# Patient Record
Sex: FEMALE
[De-identification: names, ages, dates, MRNs, and addresses within clinical notes are randomized per-mention and may not be internally consistent; named-entity substitution may affect disease eponyms.]

---

## 2017-09-27 ENCOUNTER — HOSPITAL ENCOUNTER (OUTPATIENT)
Dept: HOSPITAL 14 - H.OPSURG | Age: 47
Discharge: HOME | End: 2017-09-27
Attending: ANESTHESIOLOGY
Payer: MEDICAID

## 2017-09-27 VITALS — BODY MASS INDEX: 31.9 KG/M2

## 2017-09-27 VITALS — RESPIRATION RATE: 18 BRPM

## 2017-09-27 VITALS
DIASTOLIC BLOOD PRESSURE: 79 MMHG | OXYGEN SATURATION: 98 % | SYSTOLIC BLOOD PRESSURE: 125 MMHG | TEMPERATURE: 98.1 F | HEART RATE: 87 BPM

## 2017-09-27 DIAGNOSIS — K21.9: ICD-10-CM

## 2017-09-27 DIAGNOSIS — M17.12: Primary | ICD-10-CM

## 2017-09-27 DIAGNOSIS — I10: ICD-10-CM

## 2017-09-27 PROCEDURE — 64450 NJX AA&/STRD OTHER PN/BRANCH: CPT

## 2017-09-27 NOTE — RAD
PROCEDURE:  Fluoroscopy up to 1 hr.



HISTORY:

PAIN MANAGEMENT



COMPARISON:

None



TECHNIQUE:

Standard protocol for this study/examination.



FINDINGS:

 Total fluoroscopic time (continuous mode) utilized during the 

procedure: 12.5 seconds. Submitted images from the current procedure: 

1.0



IMPRESSION:

Less than 1 hr fluoroscopic time utilized during performance of the 

procedure.

## 2017-09-27 NOTE — OP
PROCEDURE DATE:  09/27/2017



PREOPERATIVE DIAGNOSIS:  Left knee osteoarthritis.



POSTOPERATIVE DIAGNOSIS:  Left knee osteoarthritis.



PROCEDURE:  Left knee genicular nerve block x3.



ANESTHESIA ADMINISTERED BY:  Dr. Garay.



SURGEON:  Sonido Winn MD



TYPE OF ANESTHESIA:  Monitored anesthesia care.



COMPLICATIONS:  None.



SPECIMEN:  None.



DESCRIPTION OF PROCEDURE:  As follows, after we had a discussion of the

procedure with the patient including its risks, benefits, alternative,

outcome data, possibility of no effect or increased pain, the patient

consented to the procedure.  She denies any recent infections, bleeding

tendencies or being on anticoagulants and a decision was then made to

proceed to the OR.



The patient was placed on the fluoroscopy table in a supine position with 2

pillows underneath her left knee.  The knee was prepped and draped in the

usual sterile fashion and sterile technique was adhered during the entire

procedure.  The genicular nerves were located adjacent to the medial and

lateral femoral condyle and also the lateral tibial condyle.  The 3

above-targeted areas were first visualized on the anterior posterior view

fluoroscopy.  The skin overlying the 3 areas was infiltrated with 1%

lidocaine using 25-gauge needle.  Subsequently, a 22-gauge 3-1/2-inch

spinal needle was incrementally advanced under fluoroscopic guidance until

the tip of the needle made bony contact with the target areas.  Then,

fluoroscopy was turned towards the lateral direction to confirm all 3

needles to be in the middle of the bony shaft.  After appropriate placement

of all 3 needles, approximately 4 mL of 0.5% Marcaine and Depo-Medrol

mixture was injected.  The needle was then removed and patient's knee was

cleaned and dried and bandage was applied.



The patient was then transferred to recovery area in good conditions

without any signs of CNS toxicity or any neurological deficits.  She will

have a followup in our office in approximately 2-4 weeks.





__________________________________________

En-Berhane Winn MD



DD:  09/27/2017 12:49:11

DT:  09/27/2017 13:57:08

Job # [id number]

## 2018-03-01 ENCOUNTER — HOSPITAL ENCOUNTER (INPATIENT)
Dept: HOSPITAL 14 - H.ER | Age: 48
LOS: 2 days | Discharge: HOME | DRG: 243 | End: 2018-03-03
Attending: FAMILY MEDICINE | Admitting: FAMILY MEDICINE
Payer: MEDICAID

## 2018-03-01 VITALS — BODY MASS INDEX: 31.9 KG/M2

## 2018-03-01 DIAGNOSIS — G89.29: ICD-10-CM

## 2018-03-01 DIAGNOSIS — F41.9: ICD-10-CM

## 2018-03-01 DIAGNOSIS — I10: ICD-10-CM

## 2018-03-01 DIAGNOSIS — E87.2: ICD-10-CM

## 2018-03-01 DIAGNOSIS — F32.9: ICD-10-CM

## 2018-03-01 DIAGNOSIS — M79.7: ICD-10-CM

## 2018-03-01 DIAGNOSIS — C18.9: ICD-10-CM

## 2018-03-01 DIAGNOSIS — R74.0: ICD-10-CM

## 2018-03-01 DIAGNOSIS — K21.9: ICD-10-CM

## 2018-03-01 DIAGNOSIS — E11.9: ICD-10-CM

## 2018-03-01 DIAGNOSIS — Z79.899: ICD-10-CM

## 2018-03-01 DIAGNOSIS — M54.9: Primary | ICD-10-CM

## 2018-03-01 DIAGNOSIS — C78.7: ICD-10-CM

## 2018-03-02 LAB
ALBUMIN SERPL-MCNC: 4.6 G/DL (ref 3.5–5)
ALBUMIN SERPL-MCNC: 4.6 G/DL (ref 3.5–5)
ALBUMIN/GLOB SERPL: 1.1 {RATIO} (ref 1–2.1)
ALBUMIN/GLOB SERPL: 1.2 {RATIO} (ref 1–2.1)
ALT SERPL-CCNC: 131 U/L (ref 9–52)
ALT SERPL-CCNC: 141 U/L (ref 9–52)
APTT BLD: 27.7 SECONDS (ref 25.6–37.1)
AST SERPL-CCNC: 192 U/L (ref 14–36)
AST SERPL-CCNC: 195 U/L (ref 14–36)
BASOPHILS # BLD AUTO: 0 K/UL (ref 0–0.2)
BASOPHILS NFR BLD: 0.5 % (ref 0–2)
BUN SERPL-MCNC: 10 MG/DL (ref 7–17)
BUN SERPL-MCNC: 7 MG/DL (ref 7–17)
CALCIUM SERPL-MCNC: 9.8 MG/DL (ref 8.4–10.2)
CALCIUM SERPL-MCNC: 9.8 MG/DL (ref 8.4–10.2)
EOSINOPHIL # BLD AUTO: 0.1 K/UL (ref 0–0.7)
EOSINOPHIL NFR BLD: 0.9 % (ref 0–4)
ERYTHROCYTE [DISTWIDTH] IN BLOOD BY AUTOMATED COUNT: 14.7 % (ref 11.5–14.5)
ERYTHROCYTE [DISTWIDTH] IN BLOOD BY AUTOMATED COUNT: 15.1 % (ref 11.5–14.5)
GFR NON-AFRICAN AMERICAN: > 60
GFR NON-AFRICAN AMERICAN: > 60
HGB BLD-MCNC: 14.5 G/DL (ref 12–16)
HGB BLD-MCNC: 14.6 G/DL (ref 12–16)
INR PPP: 1.2 (ref 0.9–1.2)
LIPASE SERPL-CCNC: 119 U/L (ref 23–300)
LYMPHOCYTES # BLD AUTO: 1.6 K/UL (ref 1–4.3)
LYMPHOCYTES NFR BLD AUTO: 21.8 % (ref 20–40)
MCH RBC QN AUTO: 28.4 PG (ref 27–31)
MCH RBC QN AUTO: 28.8 PG (ref 27–31)
MCHC RBC AUTO-ENTMCNC: 33.1 G/DL (ref 33–37)
MCHC RBC AUTO-ENTMCNC: 33.7 G/DL (ref 33–37)
MCV RBC AUTO: 85.5 FL (ref 81–99)
MCV RBC AUTO: 86 FL (ref 81–99)
MONOCYTES # BLD: 0.7 K/UL (ref 0–0.8)
MONOCYTES NFR BLD: 9.7 % (ref 0–10)
NEUTROPHILS # BLD: 5 K/UL (ref 1.8–7)
NEUTROPHILS NFR BLD AUTO: 67.1 % (ref 50–75)
NRBC BLD AUTO-RTO: 0.2 % (ref 0–0)
PLATELET # BLD: 235 K/UL (ref 130–400)
PLATELET # BLD: 270 K/UL (ref 130–400)
PMV BLD AUTO: 9.4 FL (ref 7.2–11.7)
PROTHROMBIN TIME: 13.1 SECONDS (ref 9.8–13.1)
RBC # BLD AUTO: 5.08 MIL/UL (ref 3.8–5.2)
RBC # BLD AUTO: 5.08 MIL/UL (ref 3.8–5.2)
WBC # BLD AUTO: 7.4 K/UL (ref 4.8–10.8)
WBC # BLD AUTO: 7.4 K/UL (ref 4.8–10.8)

## 2018-03-02 RX ADMIN — PANTOPRAZOLE SODIUM SCH MG: 40 TABLET, DELAYED RELEASE ORAL at 09:43

## 2018-03-02 RX ADMIN — ENOXAPARIN SODIUM SCH MG: 40 INJECTION SUBCUTANEOUS at 09:47

## 2018-03-02 RX ADMIN — OXYCODONE HYDROCHLORIDE AND ACETAMINOPHEN PRN TAB: 5; 325 TABLET ORAL at 16:48

## 2018-03-02 NOTE — ED PDOC
HPI: Back


Time Seen by Provider: 18 23:25


Chief Complaint (Nursing): Back Pain


Chief Complaint (Provider): Back Pain


History Per: Patient


History/Exam Limitations: no limitations


Onset/Duration Of Symptoms: Persistent, Worse Since (worse since earlier tonight

)


Current Symptoms Are (Timing): Constant


Previous Symptoms: Chronic Pain


Additional Complaint(s): 





47 year old female presents to ED with complaints of worsening back pain since 

earlier tonight and has a past medical history of chronic back pain and colon 

cancer (on 47th cycle of chemotherapy - 5fu with leucovorin). Patient states 

pain became extremely severe on the left side of her mid-back, radiating to her 

chest. (+) nausea and vomiting x4 episodes (non-bloody, non-bilious). Patient 

notes that she follows up with Dr. Winn for Ultram and Tylenol 3 for back pain, 

but has been unable to tolerate her pain medication secondary to nausea.





PCP: Roger Allen





- Risk Factors


AAA Risk Factors: 


   Neg: Older Than 49 Years Of Age





Past Medical History


Reviewed: Historical Data, Nursing Documentation, Vital Signs


Vital Signs: 


 Last Vital Signs











Temp  97.8 F   18 23:15


 


Pulse  97 H  18 23:15


 


Resp  16   18 23:15


 


BP  165/101 H  18 23:15


 


Pulse Ox  98   18 23:15














- Medical History


PMH: Anxiety, Depression, Fibromyalgia, GERD, HTN


   Denies: Chronic Kidney Disease





- Surgical History


Surgical History: Back Surgery, Endoscopy





- Family History


Family History: States: Unknown Family Hx





- Home Medications


Home Medications: 


 Ambulatory Orders











 Medication  Instructions  Recorded


 


amLODIPine [Norvasc] 10 mg PO DAILY #0 tab 10/12/15


 


DULoxetine [Cymbalta] 60 mg PO HS 10/30/15


 


Omeprazole 20 mg PO DAILY 17


 


Acetaminophen with Codeine 1 tab PO Q6 PRN 18





[Tylenol with Codeine #3 Tablet]  


 


Ondansetron [Zofran Tab] 4 mg PO Q6 PRN 18


 


Tramadol HCl [Tramadol HCl ER] 1 tab PO BID PRN 18


 


Zolpidem [Ambien] 10 mg PO HS 18














- Allergies


Allergies/Adverse Reactions: 


 Allergies











Allergy/AdvReac Type Severity Reaction Status Date / Time


 


penicillin G Allergy  RASH Verified 17 11:19


 


vancomycin Allergy  RASH Verified 17 11:19














Review of Systems


ROS Statement: Except As Marked, All Systems Reviewed And Found Negative


Cardiovascular: Positive for: Chest Pain (back pain radiates to chest)


Gastrointestinal: Positive for: Nausea, Vomiting (x4 episodes)


Musculoskeletal: Positive for: Back Pain





Physical Exam





- Reviewed


Nursing Documentation Reviewed: Yes


Vital Signs Reviewed: Yes





- Physical Exam


Appears: Positive for: Non-toxic, In Acute Distress (secondary to pain; 

hypertensive)


Skin: Positive for: Normal Color, Warm, Dry


Eye Exam: Positive for: Normal appearance


Neck: Positive for: Normal


Cardiovascular/Chest: Positive for: Regular Rate, Rhythm, Tachycardia


Respiratory: Positive for: Normal Breath Sounds.  Negative for: Respiratory 

Distress


Gastrointestinal/Abdominal: Positive for: Soft.  Negative for: Tenderness


Back: Positive for: L CVA Tenderness.  Negative for: Normal Inspection


Extremity: Positive for: Normal ROM.  Negative for: Deformity


Neurologic/Psych: Positive for: Alert, Oriented.  Negative for: Motor/Sensory 

Deficits





- Laboratory Results


Result Diagrams: 


 18 00:04





 18 00:04





- ECG


O2 Sat by Pulse Oximetry: 98 (RA)


Pulse Ox Interpretation: Normal





Medical Decision Making


Medical Decision Makin


Initial impression: severe acute back pain on chronic back pain associated with 

nausea/vomiting and history of colon cancer


Initial plan:


* EKG


* Labs


* Lact Acid


* Lipase


* UPreg


* PTT/PT


* Dilaudid 1mg IVP


* NS IV


* Zofran Inj 4mg IV


* BCx


* Re-eval





0001


* CPK


* Trop I





0059


Labs reviewed: no clinically significant abnormalities with exception of 

elevated lactic acid at 7.1


This is likely a result of active tumorlysis, given patient is currently on 

chemotherapy. 


Patient complains of persistent pain although she notes some improvement. 


Patient will be admitted for intractable back pain and lactic acidosis under 

Dr. RYLEE Recio (family practice resident on call) - INPATIENT MED/SURG.





0310


CT LUMBAR FINDINGS:


Limitations: Lack of intravenous contrast. Streak artifact - mild.


Vertebrae: No acute fracture. Surgical clips/mesh anterior to spine.


Discs/spinal canal/neural foramina: Intervertebral device at L4-L5 level. No 

significant spinal


stenosis.


Soft tissues: Unremarkable.


Lymph nodes: Borderline enlarged short axis lymph node upper abdomen, stable.


Other findings: Gas within sacroiliac joints. Probable bone islands.


IMPRESSION:


1. No fracture.


2. If back pain persists, consider MRI for further evaluation.


3. Incidental/non-acute findings are described above.








Scribe Attestation:


Documented by Renetta Pulliam acting as a scribe for Zana Contreras MD.





MD Scribe Attestation: 


All medical record entries made by the Scribe were at my direction and 

personally dictated by me. I have reviewed the chart and agree that the record 

accurately reflects my personal performance of the history, physical exam, 

medical decision making, and the department course for this patient. I have 

also personally directed, reviewed, and agree with the discharge instructions 

and disposition.





Disposition





- Disposition


Disposition Time: 00:58


Condition: FAIR





- Pt Status Changed To:


Hospital Disposition Of: Inpatient (MED/SURG)





- Admit Certification


Admit to Inpatient:: After my assessment, the patient will require 

hospitalization for at least two midnights.  This is because of the severity of 

symptoms shown, intensity of services needed, and/or the medical risk in this 

patient being treated as an outpatient.

## 2018-03-02 NOTE — RAD
HISTORY:

admit  



COMPARISON:

CT scan of the chest, abdomen and pelvis performed 04/26/2016 at 

[facility]. 



FINDINGS:



LUNGS:

No active pulmonary disease.



PLEURA:

No significant pleural effusion identified, no pneumothorax apparent.



CARDIOVASCULAR:

Normal.



OSSEOUS STRUCTURES:

Anterior cervical fixation plate. Unchanged.



VISUALIZED UPPER ABDOMEN:

Normal.



OTHER FINDINGS:

Right internal jugular access chest port, unchanged.



IMPRESSION:

No active disease.

## 2018-03-02 NOTE — CT
EXAM:

  CT Lumbar Spine Without Intravenous Contrast



CLINICAL HISTORY:

  47 years old, female; Pain; Low back pain; Prior surgery; Surgery date: 6+ 

months; Surgery type: Back surgery



TECHNIQUE:

  Axial computed tomography images of the lumbar spine without intravenous 

contrast.  All CT scans at this facility use one or more dose reduction 

techniques, viz.: automated exposure control; ma/kV adjustment per patient size 

(including targeted exams where dose is matched to indication; i.e. head); or 

iterative reconstruction technique.

  Coronal and sagittal reformatted images were created and reviewed.



COMPARISON:

  MR - SPINAL CANAL LUMBAR W/O CONT 2018-01-16 11:47, CT-10/11/2015



FINDINGS:

  Limitations:  Lack of intravenous contrast.  Streak artifact - mild.

  Vertebrae:  No acute fracture.  Surgical clips/mesh anterior to spine.

  Discs/spinal canal/neural foramina:  Intervertebral device at L4-L5 level.  

No significant spinal stenosis.

  Soft tissues:  Unremarkable.

  Lymph nodes:  Borderline enlarged short axis lymph node upper abdomen, stable.

  Other findings:  Gas within sacroiliac joints.  Probable bone islands.



IMPRESSION:     

1.  No fracture.

2.  If back pain persists, consider MRI for further evaluation.

3.  Incidental/non-acute findings are described above.

## 2018-03-02 NOTE — CP.PCM.HP
Addendum entered and electronically signed by Baylee Felipe MD  03/02/18 17:27: 





Pt seen and examined at the bedside in the am. Complained of back pain, not 

controlled with current pain regimen. Tolerating diet. 


Pain management consulted and medication regimen adjusted + End Tidal CO2 


Pt re-evaluated in the evening and states pain is well controlled. 


MRI w/ and w/o contrast-thoracic and lumbar ordered





Original Note:








History of Present Illness





- History of Present Illness


History of Present Illness: 





"my back hurts much worse than it ever did"





48 y/o female with medical hx remarkable for HTN, Colon CA (currently on chemo)

, chronic back pain (managed by pain management) and Fibromylagia, presents for 

evaluation of worsening back pain. Pt reports her pain was at baseline approx 1 

week ago but had suddenly worsened while she was lying in bed. She reports it 

shot up to a 10/10 while laying down and remained at that level even while 

taking her prescribed medications. She denies any inciting or triggering 

events. The pain is located on her right lower back, it is 10/10, constant, 

nonradiating, without any alleviating factors. She is unsure of aggravating 

factors as it is always there. She is currenlty on cycle 37 of chemo for her 

colon CA, which has mets to the liver. She also reports episodes of nausea and 

several episodes of NBNB emesis which she blames on the chemo. She has no other 

complaints and concerns.  Denies fever/chills, headaches, changes in vision, 

numbness/tingling, saddle anesthesia, urinary/bowel retention/incontinence.





PMD: [facility], [facility]


PMHx: HTN, colon cancer with liver mets, back pain


Meds: as per med rec


PsurgHx: C6 fusion, Colon resection and b/l oopherectomy, radioembolization of 

liver


SocialHx: denies ETOH/tobacco/drug abuse


FamilyHx: DM, HTN


LMP: last month


Next of kin: daughter Peter


Code status: full code 








Present on Admission





- Present on Admission


Any Indicators Present on Admission: No





Review of Systems





- Constitutional


Constitutional: absent: As Per HPI, Anorexia, Chills, Daytime Sleepiness, 

Excessive Sweating, Fatigue, Fever, Frequent Falls, Headache, Increased Appetite

, Lethargy, Malaise, Night Sweats, Snoring, Sleep Apnea, Weight Gain, Weight 

Loss, Weakness, Other





- EENT


Eyes: absent: As Per HPI, Blind Spots, Blurred Vision, Change in Vision, 

Decreased Night Vision, Diplopia, Discharge, Dry Eye, Exophthalmos, Floaters, 

Irritation, Itchy Eyes, Loss of Peripheral Vision, Pain, Photophobia, Requires 

Corrective Lenses, Sees Flashes, Spots in Vision, Tunnel Vision, Other Visual 

Disturbances, Loss of Vision, Other


Ears: absent: As Per HPI, Decreased Hearing, Ear Discharge, Ear Pain, Tinnitus, 

Abnormal Hearing, Disequilibrium, Dizziness, Other


Nose/Mouth/Throat: absent: As Per HPI, Epistaxis, Nasal Congestion, Nasal 

Discharge, Nasal Obstruction, Nasal Trauma, Nose Pain, Post Nasal Drip, Sinus 

Pain, Sinus Pressure, Bleeding Gums, Change in Voice, Dental Pain, Dry Mouth, 

Dysphagia, Halitosis, Hoarsness, Lip Swelling, Mouth Lesions, Mouth Pain, 

Odynophagia, Sore Throat, Throat Swelling, Tongue Swelling, Facial Pain, Neck 

Pain, Neck Mass, Other





- Breasts


Breasts: absent: As Per HPI, Change in Shape, Mass, Pain, Nipple Discharge, 

Nipple Inversion, Skin Changes, Swelling, Other





- Cardiovascular


Cardiovascular: absent: As Per HPI, Acrocyanosis, Chest Pain, Chest Pain at Rest

, Chest Pain with Activity, Claudication, Diaphoresis, Dyspnea, Dyspnea on 

Exertion, Edema, Irregular Heart Rhythm, Pain Radiating to Arm/Neck/Jaw, Leg 

Edema, Leg Ulcers, Lightheadedness, Orthopnea, Palpitations, Paroxysmal 

Nocturnal Dyspnea, Pedal Edema, Radiating Pain, Rapid Heart Rate, Slow Heart 

Rate, Syncope, Other





- Respiratory


Respiratory: absent: As Per HPI, Cough, Dyspnea, Hemoptysis, Dyspnea on Exertion

, Wheezing, Snoring, Stridor, Pain on Inspiration, Chest Congestion, Excessive 

Mucous Production, Change in Mucous Color, Pain with Coughing, Other





- Gastrointestinal


Gastrointestinal: Nausea, Vomiting.  absent: As Per HPI, Abdominal Pain, 

Belching, Bloating, Change in Bowel Habits, Change in Stool Character, Coffee 

Ground Emesis, Constipation, Cramping, Diarrhea, Dyspepsia, Dysphagia, Early 

Satiety, Excessive Flatus, Fecal Incontinence, Heartburn, Hematemesis, 

Hematochezia, Loose Stools, Melena, Odynophagia, Temesmus, Other





- Musculoskeletal


Musculoskeletal: As Per HPI, Back Pain, Myalgias, Neck Pain





Past Patient History





- Infectious Disease


Hx of Infectious Diseases: None





- Past Medical History & Family History


Past Medical History?: Yes





- Past Social History


Smoking Status: Never Smoked


Alcohol: None


Drugs: Denies


Home Situation {Lives}: With Family





- CARDIAC


Hx Hypertension: Yes





- PULMONARY


Hx Respiratory Disorders: No





- NEUROLOGICAL


Hx Neurological Disorder: Yes





- HEENT


Hx HEENT Problems: No





- RENAL


Hx Chronic Kidney Disease: No





- ENDOCRINE/METABOLIC


Hx Endocrine Disorders: No





- INTEGUMENTARY


Hx Dermatological Problems: No





- GENITOURINARY/GYNECOLOGICAL


Hx Genitourinary Disorders: No





- PSYCHIATRIC


Hx Anxiety: Yes


Hx Depression: Yes





- SURGICAL HISTORY


Other/Comment: STEROID INJECTION;SPINAL SURGERY-FUSION-CERVICAL 6;CERVICAL 5 

REPLACEMENT;COLONOSCOPY;ENDOSCOPY;COLON RESECTION;REMOVAL OF BILATERSAL OVARIES





- ANESTHESIA


Hx Anesthesia: Yes


Hx Anesthesia Reactions: No


Hx Malignant Hyperthermia: No





Meds


Allergies/Adverse Reactions: 


 Allergies











Allergy/AdvReac Type Severity Reaction Status Date / Time


 


penicillin G Allergy  RASH Verified 09/27/17 11:19


 


vancomycin Allergy  RASH Verified 09/27/17 11:19














Physical Exam





- Constitutional


Appears: Non-toxic, No Acute Distress





- Head Exam


Head Exam: ATRAUMATIC, NORMOCEPHALIC





- Eye Exam


Eye Exam: EOMI


Pupil Exam: PERRL





- ENT Exam


ENT Exam: Mucous Membranes Moist, Normal Exam





- Neck Exam


Neck exam: Positive for: Normal Inspection.  Negative for: Tenderness





- Respiratory Exam


Respiratory Exam: Clear to Auscultation Bilateral, NORMAL BREATHING PATTERN.  

absent: Rales, Rhonchi, Wheezes





- Cardiovascular Exam


Cardiovascular Exam: REGULAR RHYTHM, RRR, +S1, +S2.  absent: Diastolic murmur, 

JVD, Rubs, Systolic Murmur





- GI/Abdominal Exam


GI & Abdominal Exam: Normal Bowel Sounds, Soft.  absent: Tenderness





- Extremities Exam


Extremities exam: Positive for: normal inspection, pedal pulses present.  

Negative for: calf tenderness, pedal edema, tenderness





- Back Exam


Back exam: FULL ROM, tenderness (right mid back ).  absent: CVA tenderness (L), 

CVA tenderness (R), muscle spasm, paraspinal tenderness, vertebral tenderness





- Neurological Exam


Neurological exam: Alert, CN II-XII Intact, Normal Gait, Oriented x3, Reflexes 

Normal





- Psychiatric Exam


Psychiatric exam: Normal Affect, Normal Mood





- Skin


Skin Exam: Dry, Intact, Normal Color, Warm





Results





- Vital Signs


Recent Vital Signs: 





 Last Vital Signs











Temp  97.8 F   03/01/18 23:15


 


Pulse  97 H  03/01/18 23:15


 


Resp  16   03/01/18 23:15


 


BP  165/101 H  03/01/18 23:15


 


Pulse Ox  98   03/02/18 01:02














- Labs


Result Diagrams: 


 03/02/18 00:04





 03/02/18 00:04


Labs: 





 Laboratory Results - last 24 hr











  03/02/18 03/02/18 03/02/18





  00:04 00:04 00:04


 


WBC  7.4  


 


RBC  5.08  


 


Hgb  14.5  


 


Hct  43.7  


 


MCV  86.0  D  


 


MCH  28.4  


 


MCHC  33.1  


 


RDW  15.1 H  


 


Plt Count  235  


 


MPV  9.4  


 


Neut % (Auto)  67.1  


 


Lymph % (Auto)  21.8  


 


Mono % (Auto)  9.7  


 


Eos % (Auto)  0.9  


 


Baso % (Auto)  0.5  


 


Neut # (Auto)  5.0  


 


Lymph # (Auto)  1.6  


 


Mono # (Auto)  0.7  


 


Eos # (Auto)  0.1  


 


Baso # (Auto)  0.0  


 


PT   


 


INR   


 


APTT   


 


Sodium   135 


 


Potassium   3.8 


 


Chloride   94 L 


 


Carbon Dioxide   18 L 


 


Anion Gap   27 H 


 


BUN   10 


 


Creatinine   0.5 L 


 


Est GFR ( Amer)   > 60 


 


Est GFR (Non-Af Amer)   > 60 


 


Random Glucose   208 H 


 


Lactic Acid    7.1 H*


 


Calcium   9.8 


 


Magnesium   


 


Total Bilirubin   0.6 


 


AST   192 H 


 


ALT   131 H D 


 


Alkaline Phosphatase   164 H 


 


Total Creatine Kinase   


 


Troponin I   


 


Total Protein   8.6 H 


 


Albumin   4.6 


 


Globulin   4.0 H 


 


Albumin/Globulin Ratio   1.2 


 


Lipase   119 














  03/02/18 03/02/18 03/02/18





  00:04 00:14 00:34


 


WBC   


 


RBC   


 


Hgb   


 


Hct   


 


MCV   


 


MCH   


 


MCHC   


 


RDW   


 


Plt Count   


 


MPV   


 


Neut % (Auto)   


 


Lymph % (Auto)   


 


Mono % (Auto)   


 


Eos % (Auto)   


 


Baso % (Auto)   


 


Neut # (Auto)   


 


Lymph # (Auto)   


 


Mono # (Auto)   


 


Eos # (Auto)   


 


Baso # (Auto)   


 


PT  13.1  


 


INR  1.2  


 


APTT  27.7  


 


Sodium   


 


Potassium   


 


Chloride   


 


Carbon Dioxide   


 


Anion Gap   


 


BUN   


 


Creatinine   


 


Est GFR ( Amer)   


 


Est GFR (Non-Af Amer)   


 


Random Glucose   


 


Lactic Acid   


 


Calcium   


 


Magnesium    1.8


 


Total Bilirubin   


 


AST   


 


ALT   


 


Alkaline Phosphatase   


 


Total Creatine Kinase   70 


 


Troponin I   < 0.0120 


 


Total Protein   


 


Albumin   


 


Globulin   


 


Albumin/Globulin Ratio   


 


Lipase   














Assessment & Plan





- Assessment and Plan (Free Text)


Assessment: 





48 y/o female with hx of colon CA on chemo admitted for intractable back pain.





Plan:





1) Intractable Back Pain


-s/p 3mg Dilaudid in ED


-LS CT w/o contrast: no fracture or spinal stenosis when compared to MRI last 

month


-pain control


-was last seen by Dr. Winn on 02/23/2018, no evidence in encounter of worsening 

pain


-anesthesiology consult with Dr. Winn, follow up recommendations





2) Colon Cancer with Liver metastasis


-currently on cycle 37 of chemo


-pt [facility] 

tomorrow


-Zofran for nausea





3) Hypertension


-stable


-c/w home meds as ordered





4) Transaminemia


-likley 2/2 to liver mets 





5) Lactic Acidosis


-likely 2/2 to tumor lysis from current chemo therapy





6) Diet


-regular diet





7) Prophylaxis


-Lovenox 40mg SC QD

## 2018-03-02 NOTE — CP.PCM.CON
History of Present Illness





- History of Present Illness


History of Present Illness: 


48 y/o female with medical hx remarkable for  chronic back pain (managed by 

pain management) and Fibromylagia, presents for evaluation of worsening back 

pain. Pt reports pain escalated to a 10/10 despite taking her prescribed 

medications. She denies any inciting or triggering events. The pain is located 

on her left lower back radiating to her ribs. She denies aggravating factors. 

She is currenlty on cycle 37 of chemo for her colon CA, which has mets to the 

liver.   Denies fever/chills, headaches, changes in vision, numbness/tingling, 

saddle anesthesia, urinary/bowel retention/incontinence.








Past Patient History





- Infectious Disease


Hx of Infectious Diseases: None





- Past Medical History & Family History


Past Medical History?: Yes





- Past Social History


Smoking Status: Never Smoked


Alcohol: None


Drugs: Denies


Home Situation {Lives}: With Family





- CARDIAC


Hx Hypertension: Yes





- PULMONARY


Hx Respiratory Disorders: No





- NEUROLOGICAL


Hx Neurological Disorder: Yes





- HEENT


Hx HEENT Problems: No





- RENAL


Hx Chronic Kidney Disease: No





- ENDOCRINE/METABOLIC


Hx Endocrine Disorders: No





- INTEGUMENTARY


Hx Dermatological Problems: No





- GENITOURINARY/GYNECOLOGICAL


Hx Genitourinary Disorders: No





- PSYCHIATRIC


Hx Anxiety: Yes


Hx Depression: Yes





- SURGICAL HISTORY


Other/Comment: STEROID INJECTION;SPINAL SURGERY-FUSION-CERVICAL 6;CERVICAL 5 

REPLACEMENT;COLONOSCOPY;ENDOSCOPY;COLON RESECTION;REMOVAL OF BILATERSAL OVARIES





- ANESTHESIA


Hx Anesthesia: Yes


Hx Anesthesia Reactions: No


Hx Malignant Hyperthermia: No





Meds


Allergies/Adverse Reactions: 


 Allergies











Allergy/AdvReac Type Severity Reaction Status Date / Time


 


penicillin G Allergy  RASH Verified 09/27/17 11:19


 


vancomycin Allergy  RASH Verified 09/27/17 11:19














- Medications


Medications: 


 Current Medications





Acetaminophen/Codeine Phosphate (Tylenol/Codeine 300 Mg/30 Mg)  1 tab PO Q6 PRN


   PRN Reason: Pain, moderate (4-7)


   Last Admin: 03/02/18 07:50 Dose:  1 tab


Amlodipine Besylate (Norvasc)  10 mg PO DAILY [facility]


   Last Admin: 03/02/18 09:47 Dose:  10 mg


Docusate Sodium (Colace)  100 mg PO BID [facility]


   Last Admin: 03/02/18 09:50 Dose:  100 mg


Duloxetine HCl (Cymbalta)  60 mg PO [facility]


Enoxaparin Sodium (Lovenox)  40 mg SC DAILY [facility]


   PRN Reason: Protocol


   Last Admin: 03/02/18 09:47 Dose:  40 mg


Hydromorphone HCl (Dilaudid)  1 mg IVP Q6 PRN


   PRN Reason: Pain, severe (8-10)


   Last Admin: 03/02/18 10:52 Dose:  1 mg


Lidocaine (Lidoderm)  1 ea TD DAILY [facility]


Lidocaine (Lidoderm)  1 ea TD DAILY [facility]


Lorazepam (Ativan)  1 mg PO Q6 PRN


   PRN Reason: Anxiety


Ondansetron HCl (Zofran Inj)  4 mg IVP Q6 PRN


   PRN Reason: Nausea/Vomiting


Pantoprazole Sodium (Protonix Ec Tab)  40 mg PO DAILY [facility]


   Last Admin: 03/02/18 09:43 Dose:  40 mg


Tramadol HCl (Ultram)  50 mg PO Q6H PRN


   PRN Reason: Pain, moderate (4-7)


   Last Admin: 03/02/18 09:38 Dose:  50 mg


Zolpidem Tartrate (Ambien)  5 mg PO [facility]











Physical Exam





- Back Exam


Additional comments: 





limited ROM


mild lumbar pvb tenderness


sensation intact


negative SLR


DP flex 5/5 bilateral LE, sensation intact





Results





- Vital Signs


Recent Vital Signs: 


 Last Vital Signs











Temp  97.6 F   03/02/18 08:15


 


Pulse  101 H  03/02/18 09:47


 


Resp  19   03/02/18 08:15


 


BP  151/86 H  03/02/18 09:47


 


Pulse Ox  100   03/02/18 08:15














- Labs


Result Diagrams: 


 03/02/18 08:50





 03/02/18 08:50


Labs: 


 Laboratory Results - last 24 hr











  03/02/18 03/02/18 03/02/18





  00:04 00:04 00:04


 


WBC  7.4  


 


RBC  5.08  


 


Hgb  14.5  


 


Hct  43.7  


 


MCV  86.0  D  


 


MCH  28.4  


 


MCHC  33.1  


 


RDW  15.1 H  


 


Plt Count  235  


 


MPV  9.4  


 


Neut % (Auto)  67.1  


 


Lymph % (Auto)  21.8  


 


Mono % (Auto)  9.7  


 


Eos % (Auto)  0.9  


 


Baso % (Auto)  0.5  


 


Neut # (Auto)  5.0  


 


Lymph # (Auto)  1.6  


 


Mono # (Auto)  0.7  


 


Eos # (Auto)  0.1  


 


Baso # (Auto)  0.0  


 


PT   


 


INR   


 


APTT   


 


Sodium   135 


 


Potassium   3.8 


 


Chloride   94 L 


 


Carbon Dioxide   18 L 


 


Anion Gap   27 H 


 


BUN   10 


 


Creatinine   0.5 L 


 


Est GFR ( Amer)   > 60 


 


Est GFR (Non-Af Amer)   > 60 


 


Random Glucose   208 H 


 


Lactic Acid    7.1 H*


 


Calcium   9.8 


 


Magnesium   


 


Total Bilirubin   0.6 


 


AST   192 H 


 


ALT   131 H D 


 


Alkaline Phosphatase   164 H 


 


Total Creatine Kinase   


 


Troponin I   


 


Total Protein   8.6 H 


 


Albumin   4.6 


 


Globulin   4.0 H 


 


Albumin/Globulin Ratio   1.2 


 


Lipase   119 














  03/02/18 03/02/18 03/02/18





  00:04 00:14 00:34


 


WBC   


 


RBC   


 


Hgb   


 


Hct   


 


MCV   


 


MCH   


 


MCHC   


 


RDW   


 


Plt Count   


 


MPV   


 


Neut % (Auto)   


 


Lymph % (Auto)   


 


Mono % (Auto)   


 


Eos % (Auto)   


 


Baso % (Auto)   


 


Neut # (Auto)   


 


Lymph # (Auto)   


 


Mono # (Auto)   


 


Eos # (Auto)   


 


Baso # (Auto)   


 


PT  13.1  


 


INR  1.2  


 


APTT  27.7  


 


Sodium   


 


Potassium   


 


Chloride   


 


Carbon Dioxide   


 


Anion Gap   


 


BUN   


 


Creatinine   


 


Est GFR ( Amer)   


 


Est GFR (Non-Af Amer)   


 


Random Glucose   


 


Lactic Acid   


 


Calcium   


 


Magnesium    1.8


 


Total Bilirubin   


 


AST   


 


ALT   


 


Alkaline Phosphatase   


 


Total Creatine Kinase   70 


 


Troponin I   < 0.0120 


 


Total Protein   


 


Albumin   


 


Globulin   


 


Albumin/Globulin Ratio   


 


Lipase   














  03/02/18 03/02/18 03/02/18





  08:50 08:50 08:50


 


WBC  7.4  


 


RBC  5.08  


 


Hgb  14.6  


 


Hct  43.4  


 


MCV  85.5  


 


MCH  28.8  


 


MCHC  33.7  


 


RDW  14.7 H  


 


Plt Count  270  


 


MPV   


 


Neut % (Auto)   


 


Lymph % (Auto)   


 


Mono % (Auto)   


 


Eos % (Auto)   


 


Baso % (Auto)   


 


Neut # (Auto)   


 


Lymph # (Auto)   


 


Mono # (Auto)   


 


Eos # (Auto)   


 


Baso # (Auto)   


 


PT   


 


INR   


 


APTT   


 


Sodium   141 


 


Potassium   3.9 


 


Chloride   100 


 


Carbon Dioxide   23 


 


Anion Gap   22 H 


 


BUN   7 


 


Creatinine   0.4 L 


 


Est GFR ( Amer)   > 60 


 


Est GFR (Non-Af Amer)   > 60 


 


Random Glucose   151 H 


 


Lactic Acid    2.4 H


 


Calcium   9.8 


 


Magnesium   


 


Total Bilirubin   0.7 


 


AST   195 H 


 


ALT   141 H 


 


Alkaline Phosphatase   146 H 


 


Total Creatine Kinase   


 


Troponin I   


 


Total Protein   8.7 H 


 


Albumin   4.6 


 


Globulin   4.1 H 


 


Albumin/Globulin Ratio   1.1 


 


Lipase   














Assessment & Plan





- Assessment and Plan (Free Text)


Assessment: 





47yF with colon cancer and acute on chronic back pain


Plan: 





1. Physical Therapy


2.  Percocet 1-2 tabs po q4 hr prn moderate pain


3. Morphine 4mg IV q4h prn breakthrough pain


4.  Gabapentin 100 mg po TID


5.  Cymbalta 30 mg po daily


6.  F/u with PMD and Dr Winn after discharge


7.  MRI L spine and T spine


8. flexeril 5mg po tid prn muscle spasm

## 2018-03-02 NOTE — PCM.RRT
<Baylee Felipe - Last Filed: 03/02/18 09:01>





I.Reason for RRT





- A) Acute Change in Patient:


Subjective: 








RRT Start Time: 7:34


RRT Reason: Intractable back pain








S: RRT called by RN because pt was complaining of severe back pain that was not 

relieved but current pain regimen. Pt complained of back pain. Denied cp, sob, 

headache, numbness or tingling, or weakness. 





O: Vitals /86, 75, O2 sat 100%, T 97.6


General: Pt seen lying in bed, distressed, moaning


HEENT: normocephalic, atraumatic


Cardiac: RRR, normal S1, S2, no murmurs


Pulm: CTABL, no wheezing


Abdomen: Soft, nontender, non distended, normal BS


Extremities: no Le edema


Neuro: no gross focal neurological deficits





RRT Interventions: 2mg of Dilaudid x1





Reassessment: Pt was re-evaluated 10min after receiving Dilaudid, mildly 

distressed, stating improvement in pain





Assessment: Pt is a 48 y/o female admitted for intractable back pain currently 

receiving Tramadol and Cymbalta with complaints of acute back for which a rapid 

response was called. 


Plan: Pain consult in place to assess for long term pain management. Will re-

evaluate pt's current home pain regimen. 





RRT MD: Dr. Marie Dudley


RRT End Time: 7:50am











<Zhane Reardon - Last Filed: 03/02/18 14:21>





Attending/Attestation





- Attestation


I have personally seen and examined this patient.: Yes


I have fully participated in the care of the patient.: Yes


I have reviewed all pertinent clinical information, including history, physical 

exam and plan: Yes


Notes (Text): 








Intractable Low Back Pain


- CT of  L spine : no fracture


- no focal neuro deficit, no saddle anesthesia


- no urinary retention


- Dilaudid 2 mg IV given


- Pain mgt consult


- further work up for LBP








03/02/18 14:21

## 2018-03-02 NOTE — CARD
--------------- APPROVED REPORT --------------





EKG Measurement

Heart [id number]

WI [id number]

[id number]

[id number]

[id number]



<Conclusion>

Normal sinus rhythm

Normal ECG

## 2018-03-03 VITALS — RESPIRATION RATE: 10 BRPM | OXYGEN SATURATION: 100 %

## 2018-03-03 VITALS — HEART RATE: 119 BPM | TEMPERATURE: 98.2 F | DIASTOLIC BLOOD PRESSURE: 82 MMHG | SYSTOLIC BLOOD PRESSURE: 115 MMHG

## 2018-03-03 RX ADMIN — OXYCODONE HYDROCHLORIDE AND ACETAMINOPHEN PRN TAB: 5; 325 TABLET ORAL at 00:14

## 2018-03-03 RX ADMIN — PANTOPRAZOLE SODIUM SCH MG: 40 TABLET, DELAYED RELEASE ORAL at 08:49

## 2018-03-03 RX ADMIN — ENOXAPARIN SODIUM SCH MG: 40 INJECTION SUBCUTANEOUS at 08:48

## 2018-03-03 RX ADMIN — OXYCODONE HYDROCHLORIDE AND ACETAMINOPHEN PRN TAB: 5; 325 TABLET ORAL at 12:18

## 2018-03-03 NOTE — MRI
PROCEDURE:  MRI lumbar spine dated 03/02/2018 



HISTORY:

Acute back pain 



COMPARISON:

Comparison made with CT scan lumbar spine performed earlier same day 

as well as prior MRI of the lumbar spine 01/16/2018. 



TECHNIQUE:

Multiecho multiplanar sequences were performed through the lumbar 

spine with and without the use of intravenous contrast. 18 cc 

Omniscan injected for this exam 



FINDINGS:

Susceptibility artifact emanating from the metallic fusion hardware 

within the L4-L5 disc space obscures surrounding detail. Please refer 

to CT scan of the lumbar spine for additional details regarding the 

integrity of the metallic fixation hardware and fusion itself. The 

facet joints at this level are slightly overgrown. Mild narrowing of 

the lateral recesses right greater than left Overall central canal 

appears adequate so far as can be seen. Exit foramina also appear 

adequate 



At the L5-S1 level, there is mild age related disc desiccation.  Disc 

space height maintained. No disc herniation however minimal proximal 

left foraminal disc bulging noted. .  Facets are slightly 

hypertrophic.  Central canal and exit foramina are adequate. 



At the L3- L4 level, there is also mild age related disc desiccation. 

 Disc space height maintained. Very minor broad-based bulge of the 

posterior annulus results in mild broad flattening of the ventral 

surface of thecal sac more so on the left side with protrusion 

component on extending into the proximal inferior margin left exit 

foramen. . .  The left lateral recess is slightly narrowed. The 

overall central canal is quite capacious. The facets are 

hypertrophic.  Exit foramina mildly narrowed on the left and adequate 

on the right. . 



The remaining levels exhibit adequate disc height and hydration. No 

disc herniation or significant disc bulge. .  Facets are slightly 

overgrown at the L2-L3 and L1-L2 levels. Central canal and exit 

foramina adequate. 



IMPRESSION:

Susceptibility artifact related to metallic fusion hardware in the 

L4-L5 disc space limits evaluation to some degree. Please refer to CT 

scan lumbar spine for additional details. .  Slight narrowing of the 

lateral recesses right greater than left 



Mild degenerative spondylosis L3-L4 level with broad-based disc bulge 

and small proximal left foraminal protrusion component. Mild 

multilevel facet arthropathy as above. 



Preliminary report provided by overnight radiology service.

## 2018-03-03 NOTE — CP.PCM.DIS
Provider





- Provider


Date of Admission: 


03/02/18 00:58





Attending physician: 


Dayana Langley MD





Time Spent in preparation of Discharge (in minutes): 45





Diagnosis





- Discharge Diagnosis


(1) Intractable abdominal pain


Status: Acute   





Hospital Course





- Lab Results


Lab Results: 


 Micro Results





03/01/18 23:55   Blood-Venous   Blood Culture - Preliminary


                            NO GROWTH AFTER 24 HOURS


03/01/18 23:55   Blood-Venous   Blood Culture - Preliminary


                            NO GROWTH AFTER 24 HOURS





 Most Recent Lab Values











WBC  7.4 K/uL (4.8-10.8)   03/02/18  08:50    


 


RBC  5.08 Mil/uL (3.80-5.20)   03/02/18  08:50    


 


Hgb  14.6 g/dL (12.0-16.0)   03/02/18  08:50    


 


Hct  43.4 % (34.0-47.0)   03/02/18  08:50    


 


MCV  85.5 fl (81.0-99.0)   03/02/18  08:50    


 


MCH  28.8 pg (27.0-31.0)   03/02/18  08:50    


 


MCHC  33.7 g/dL (33.0-37.0)   03/02/18  08:50    


 


RDW  14.7 % (11.5-14.5)  H  03/02/18  08:50    


 


Plt Count  270 K/uL (130-400)   03/02/18  08:50    


 


MPV  9.4 fl (7.2-11.7)   03/02/18  00:04    


 


Neut % (Auto)  67.1 % (50.0-75.0)   03/02/18  00:04    


 


Lymph % (Auto)  21.8 % (20.0-40.0)   03/02/18  00:04    


 


Mono % (Auto)  9.7 % (0.0-10.0)   03/02/18  00:04    


 


Eos % (Auto)  0.9 % (0.0-4.0)   03/02/18  00:04    


 


Baso % (Auto)  0.5 % (0.0-2.0)   03/02/18  00:04    


 


Neut # (Auto)  5.0 K/uL (1.8-7.0)   03/02/18  00:04    


 


Lymph # (Auto)  1.6 K/uL (1.0-4.3)   03/02/18  00:04    


 


Mono # (Auto)  0.7 K/uL (0.0-0.8)   03/02/18  00:04    


 


Eos # (Auto)  0.1 K/uL (0.0-0.7)   03/02/18  00:04    


 


Baso # (Auto)  0.0 K/uL (0.0-0.2)   03/02/18  00:04    


 


PT  13.1 Seconds (9.8-13.1)   03/02/18  00:04    


 


INR  1.2  (0.9-1.2)   03/02/18  00:04    


 


APTT  27.7 Seconds (25.6-37.1)   03/02/18  00:04    


 


Sodium  141 mmol/l (132-148)   03/02/18  08:50    


 


Potassium  3.9 MMOL/L (3.6-5.0)   03/02/18  08:50    


 


Chloride  100 mmol/L ()   03/02/18  08:50    


 


Carbon Dioxide  23 mmol/L (22-30)   03/02/18  08:50    


 


Anion Gap  22  (10-20)  H  03/02/18  08:50    


 


BUN  7 mg/dl (7-17)   03/02/18  08:50    


 


Creatinine  0.4 mg/dl (0.7-1.2)  L  03/02/18  08:50    


 


Est GFR ( Amer)  > 60   03/02/18  08:50    


 


Est GFR (Non-Af Amer)  > 60   03/02/18  08:50    


 


Random Glucose  151 mg/dL ()  H  03/02/18  08:50    


 


Lactic Acid  2.4 MMOL/L (0.7-2.1)  H  03/02/18  08:50    


 


Calcium  9.8 mg/dL (8.4-10.2)   03/02/18  08:50    


 


Magnesium  1.8 MG/DL (1.6-2.3)   03/02/18  00:34    


 


Total Bilirubin  0.7 mg/dl (0.2-1.3)   03/02/18  08:50    


 


AST  195 U/L (14-36)  H  03/02/18  08:50    


 


ALT  141 U/L (9-52)  H  03/02/18  08:50    


 


Alkaline Phosphatase  146 U/L ()  H  03/02/18  08:50    


 


Total Creatine Kinase  70 U/L ()   03/02/18  00:14    


 


Troponin I  < 0.0120 ng/mL (0.00-0.120)   03/02/18  00:14    


 


Total Protein  8.7 G/DL (6.3-8.2)  H  03/02/18  08:50    


 


Albumin  4.6 g/dL (3.5-5.0)   03/02/18  08:50    


 


Globulin  4.1 gm/dL (2.2-3.9)  H  03/02/18  08:50    


 


Albumin/Globulin Ratio  1.1  (1.0-2.1)   03/02/18  08:50    


 


Lipase  119 U/L ()   03/02/18  00:04    














- Hospital Course


Hospital Course: 


\





Hospital Course:


48 y/o female with medical hx remarkable for HTN, Colon CA (currently on chemo)

, chronic back pain (managed by pain management) and Fibromylagia, presents for 

evaluation of intractable back pan. Pt was seen by Pain Management and pain 

medications were adjusted. PT self administered her 38th round of chemotherapy 

during admission. Thoracic and Lumbar MRI no acute findings. Pts pain was 

controlled and she was discharged on Day 2. 





Discharge Medications:


Cyclobenzaprine 5mg po TID


Cymbalta 30mg PO daily


Gabapentin 100mg po TID


Lidoderm patch 5% TD once








Condition upon discharge: Fair


Activity: Ambulating without assistance


Discharge Instructions: F/U with pain management out patient within 1 week (

appt to be scheduled) for adjustments to pain regimen.  


 





Discharge Exam





- Head Exam


Head Exam: ATRAUMATIC, NORMOCEPHALIC





- Eye Exam


Eye Exam: Normal appearance.  absent: Conjunctival injection





- ENT Exam


ENT Exam: Mucous Membranes Moist





- Respiratory Exam


Respiratory Exam: NORMAL BREATHING PATTERN.  absent: Rales, Wheezes





- Cardiovascular Exam


Cardiovascular Exam: REGULAR RHYTHM, +S1, +S2.  absent: Systolic Murmur





- GI/Abdominal Exam


GI & Abdominal Exam: Normal Bowel Sounds, Soft.  absent: Distended, Tenderness





- Neurological Exam


Neurological exam: Alert, Oriented x3





- Psychiatric Exam


Psychiatric exam: Normal Affect





Discharge Plan





- Discharge Medications


Prescriptions: 


Cyclobenzaprine [Cyclobenzaprine HCl] 5 mg PO TID #21 tab


Docusate [Colace] 100 mg PO BID #14 cap


DULoxetine [Cymbalta] 30 mg PO DAILY #7 ecc


Gabapentin 100 mg PO TID #21 tablet


Lidocaine 5% [Lidoderm] 1 ea TD ONCE #7 patch





- Follow Up Plan


Condition: FAIR


Disposition: HOME/ ROUTINE


Referrals: 


[facility] [Outside] - 03/05/18 11:20 am


Sonido Winn MD [Staff Provider] -  (Central Scheduling will be 

contacting you for a March 5th appt time. )

## 2018-03-03 NOTE — MRI
PROCEDURE:  MRI of the thoracic spine dated 03/02/2018. 



HISTORY:

Acute back pain 



COMPARISON:

No prior study available for comparison however correlation made with 

concurrent MRI of the lumbar spine. 



TECHNIQUE:

Multiecho multiplanar sequences were performed through the thoracic 

spine with and without the use of intravenous contrast. 18 cc of 

Omniscan injected for this procedure. Note that the examination is 

limited as patient was unable to finish the exam and sagittal 

postcontrast T1 sequences are not obtained. Study is further limited 

by motion artifact. 



FINDINGS:



ALIGNMENT:

No acute compression fractures no retropulsed fragments.  There 

appears to be a localized levoscoliosis in the upper thoracic region. 

Polyp 



Minor multilevel degenerative spondylosis is present. Changes 

included mild age related disc desiccation most notably affecting 

upper to mid thoracic disc space levels with minor disc bulging that 

does result in moderate compressive effects on the ventral surface of 

thecal sac without significant canal compromise nor cord compression. 



No definitive evidence of abnormal enhancement within the disc spaces 

on axial images seen to suggest discitis osteomyelitis however due to 

the lack of postcontrast sagittal T1 imaging the study is quite 

limited. 



Evaluation for pathologic signal changes in the spinal cord is 

limited due to the aforementioned motion artifact. .  Linear on 

prolonged T2 signal changes within the spinal cord both on sagittal 

T2 and STIR sequences likely representing some combination of wall 

motion and Hunter type artifact. No definitive evidence of abnormal  

contrast enhancement seen within or along the surfaces of the 

visualized spinal cord. . 



Paraspinal soft tissues appear grossly unremarkable 



Magnetic susceptibility artifact related ACDF and at 2 level anterior 

fixation plate at the C5-C6 level. .



IMPRESSION:

Study is limited as patient was unable to finish exam and as a result 

the sagittal post-contrast T1 sequences not obtained.  Study is 

further limited by motion artifact.



No acute compression fractures no retropulsed fragments.  There is a 

mild levoscoliosis centered in the upper thoracic region.  No 

definitive evidence of abnormal enhancement. 



Minor multilevel degenerative spondylosis with shallow disc bulging 

changes seen at several levels that do not result in significant 

canal compromise nor cord compression.

## 2018-05-27 ENCOUNTER — HOSPITAL ENCOUNTER (OUTPATIENT)
Dept: HOSPITAL 14 - H.ER | Age: 48
Setting detail: OBSERVATION
LOS: 2 days | Discharge: HOME | End: 2018-05-29
Attending: FAMILY MEDICINE | Admitting: FAMILY MEDICINE
Payer: MEDICAID

## 2018-05-27 ENCOUNTER — HOSPITAL ENCOUNTER (EMERGENCY)
Dept: HOSPITAL 14 - H.ER | Age: 48
Discharge: HOME | End: 2018-05-27
Payer: MEDICAID

## 2018-05-27 VITALS
DIASTOLIC BLOOD PRESSURE: 91 MMHG | TEMPERATURE: 98.4 F | OXYGEN SATURATION: 99 % | SYSTOLIC BLOOD PRESSURE: 129 MMHG | RESPIRATION RATE: 20 BRPM | HEART RATE: 92 BPM

## 2018-05-27 VITALS — BODY MASS INDEX: 31.9 KG/M2

## 2018-05-27 VITALS — BODY MASS INDEX: 31.4 KG/M2

## 2018-05-27 DIAGNOSIS — K21.9: ICD-10-CM

## 2018-05-27 DIAGNOSIS — M54.5: Primary | ICD-10-CM

## 2018-05-27 DIAGNOSIS — M79.7: ICD-10-CM

## 2018-05-27 DIAGNOSIS — M46.96: ICD-10-CM

## 2018-05-27 DIAGNOSIS — I10: ICD-10-CM

## 2018-05-27 DIAGNOSIS — Z85.038: ICD-10-CM

## 2018-05-27 DIAGNOSIS — F32.9: ICD-10-CM

## 2018-05-27 DIAGNOSIS — E11.9: ICD-10-CM

## 2018-05-27 DIAGNOSIS — M46.1: Primary | ICD-10-CM

## 2018-05-27 DIAGNOSIS — Z79.899: ICD-10-CM

## 2018-05-27 DIAGNOSIS — M41.9: ICD-10-CM

## 2018-05-27 DIAGNOSIS — C18.9: ICD-10-CM

## 2018-05-27 DIAGNOSIS — C78.7: ICD-10-CM

## 2018-05-27 DIAGNOSIS — F41.9: ICD-10-CM

## 2018-05-27 LAB
ALBUMIN SERPL-MCNC: 4.5 G/DL (ref 3.5–5)
ALBUMIN/GLOB SERPL: 1.1 {RATIO} (ref 1–2.1)
ALT SERPL-CCNC: 90 U/L (ref 9–52)
AST SERPL-CCNC: 71 U/L (ref 14–36)
BASOPHILS # BLD AUTO: 0 K/UL (ref 0–0.2)
BASOPHILS # BLD AUTO: 0.1 K/UL (ref 0–0.2)
BASOPHILS NFR BLD: 0.4 % (ref 0–2)
BASOPHILS NFR BLD: 0.5 % (ref 0–2)
BUN SERPL-MCNC: 11 MG/DL (ref 7–17)
BUN SERPL-MCNC: 11 MG/DL (ref 7–17)
CALCIUM SERPL-MCNC: 10 MG/DL (ref 8.4–10.2)
CALCIUM SERPL-MCNC: 9.9 MG/DL (ref 8.4–10.2)
EOSINOPHIL # BLD AUTO: 0.1 K/UL (ref 0–0.7)
EOSINOPHIL # BLD AUTO: 0.2 K/UL (ref 0–0.7)
EOSINOPHIL NFR BLD: 1.3 % (ref 0–4)
EOSINOPHIL NFR BLD: 1.7 % (ref 0–4)
ERYTHROCYTE [DISTWIDTH] IN BLOOD BY AUTOMATED COUNT: 15.9 % (ref 11.5–14.5)
ERYTHROCYTE [DISTWIDTH] IN BLOOD BY AUTOMATED COUNT: 16 % (ref 11.5–14.5)
GFR NON-AFRICAN AMERICAN: > 60
GFR NON-AFRICAN AMERICAN: > 60
HGB BLD-MCNC: 14.8 G/DL (ref 12–16)
HGB BLD-MCNC: 15.4 G/DL (ref 12–16)
LYMPHOCYTES # BLD AUTO: 1.4 K/UL (ref 1–4.3)
LYMPHOCYTES # BLD AUTO: 1.4 K/UL (ref 1–4.3)
LYMPHOCYTES NFR BLD AUTO: 13.5 % (ref 20–40)
LYMPHOCYTES NFR BLD AUTO: 15.5 % (ref 20–40)
MCH RBC QN AUTO: 28.7 PG (ref 27–31)
MCH RBC QN AUTO: 28.8 PG (ref 27–31)
MCHC RBC AUTO-ENTMCNC: 33.6 G/DL (ref 33–37)
MCHC RBC AUTO-ENTMCNC: 33.6 G/DL (ref 33–37)
MCV RBC AUTO: 85.3 FL (ref 81–99)
MCV RBC AUTO: 85.6 FL (ref 81–99)
MONOCYTES # BLD: 1.2 K/UL (ref 0–0.8)
MONOCYTES # BLD: 1.3 K/UL (ref 0–0.8)
MONOCYTES NFR BLD: 12.7 % (ref 0–10)
MONOCYTES NFR BLD: 13.4 % (ref 0–10)
NEUTROPHILS # BLD: 6.3 K/UL (ref 1.8–7)
NEUTROPHILS # BLD: 7.4 K/UL (ref 1.8–7)
NEUTROPHILS NFR BLD AUTO: 69.4 % (ref 50–75)
NEUTROPHILS NFR BLD AUTO: 71.6 % (ref 50–75)
NRBC BLD AUTO-RTO: 0.1 % (ref 0–0)
NRBC BLD AUTO-RTO: 0.1 % (ref 0–0)
PLATELET # BLD: 208 K/UL (ref 130–400)
PLATELET # BLD: 210 K/UL (ref 130–400)
PMV BLD AUTO: 9.1 FL (ref 7.2–11.7)
PMV BLD AUTO: 9.3 FL (ref 7.2–11.7)
RBC # BLD AUTO: 5.17 MIL/UL (ref 3.8–5.2)
RBC # BLD AUTO: 5.37 MIL/UL (ref 3.8–5.2)
WBC # BLD AUTO: 10.3 K/UL (ref 4.8–10.8)
WBC # BLD AUTO: 9.1 K/UL (ref 4.8–10.8)

## 2018-05-27 PROCEDURE — 85025 COMPLETE CBC W/AUTO DIFF WBC: CPT

## 2018-05-27 PROCEDURE — 80053 COMPREHEN METABOLIC PANEL: CPT

## 2018-05-27 PROCEDURE — 83036 HEMOGLOBIN GLYCOSYLATED A1C: CPT

## 2018-05-27 PROCEDURE — 96374 THER/PROPH/DIAG INJ IV PUSH: CPT

## 2018-05-27 PROCEDURE — 96375 TX/PRO/DX INJ NEW DRUG ADDON: CPT

## 2018-05-27 PROCEDURE — 85730 THROMBOPLASTIN TIME PARTIAL: CPT

## 2018-05-27 PROCEDURE — 82948 REAGENT STRIP/BLOOD GLUCOSE: CPT

## 2018-05-27 PROCEDURE — 99283 EMERGENCY DEPT VISIT LOW MDM: CPT

## 2018-05-27 PROCEDURE — 80048 BASIC METABOLIC PNL TOTAL CA: CPT

## 2018-05-27 PROCEDURE — 96372 THER/PROPH/DIAG INJ SC/IM: CPT

## 2018-05-27 PROCEDURE — 27096 INJECT SACROILIAC JOINT: CPT

## 2018-05-27 PROCEDURE — 99284 EMERGENCY DEPT VISIT MOD MDM: CPT

## 2018-05-27 PROCEDURE — 81025 URINE PREGNANCY TEST: CPT

## 2018-05-27 PROCEDURE — 96361 HYDRATE IV INFUSION ADD-ON: CPT

## 2018-05-27 PROCEDURE — 36415 COLL VENOUS BLD VENIPUNCTURE: CPT

## 2018-05-27 PROCEDURE — 72100 X-RAY EXAM L-S SPINE 2/3 VWS: CPT

## 2018-05-27 PROCEDURE — 85610 PROTHROMBIN TIME: CPT

## 2018-05-27 PROCEDURE — 81003 URINALYSIS AUTO W/O SCOPE: CPT

## 2018-05-27 PROCEDURE — 96376 TX/PRO/DX INJ SAME DRUG ADON: CPT

## 2018-05-27 NOTE — ED PDOC
HPI: Back


Time Seen by Provider: 05/27/18 21:39


Chief Complaint (Nursing): Back Pain


Chief Complaint (Provider): Back Pain


History Per: Patient


History/Exam Limitations: no limitations


Current Symptoms Are (Timing): Still Present


Quality Of Discomfort: Sharp


Pain Scale Rating Of: 10


Previous Symptoms: Back Pain


Additional Complaint(s): 


46 y/o  female with past medical history of chronic back pain and back 

surgery presents to the ED for sharp lower extremity pain and back pain, rating 

10/10. Patient was seen in the ED provider this morning, where X-ray and workup 

was done and patient was discharged home. Reports taking Tramadol with no 

relief and her pain is getting worse. Also reports vomiting and is unable to 

tolerate any PO medications. States using icy patch without relief. Denies 

urinary or bowel continence, urinary or bowel retention, loss of sensation in 

lower extremities or any further medical complaints.





PMD: Carlin Kaur MD








Past Medical History


Reviewed: Historical Data, Nursing Documentation, Vital Signs


Vital Signs: 


 Last Vital Signs











Temp  98.6 F   05/27/18 21:38


 


Pulse  104 H  05/27/18 21:38


 


Resp  18   05/27/18 21:38


 


BP  155/95 H  05/27/18 21:38


 


Pulse Ox  100   05/27/18 21:38














- Medical History


PMH: Anxiety, Back Problems, Depression, Fibromyalgia, GERD, HTN


   Denies: Chronic Kidney Disease





- Surgical History


Surgical History: Back Surgery, Endoscopy


Other surgeries: STEROID INJECTION;SPINAL SURGERY-FUSION-CERVICAL 6;CERVICAL 5 

REPLACEMENT;COLONOSCOPY;ENDOSCOPY;COLON RESECTION;REMOVAL OF BILATERSAL OVARIES





- Family History


Family History: States: Unknown Family Hx





- Social History


Current smoker - smoking cessation education provided: No (Never smoked)


Alcohol: None


Drugs: Denies





- Home Medications


Home Medications: 


 Ambulatory Orders











 Medication  Instructions  Recorded


 


amLODIPine [Norvasc] 10 mg PO DAILY #0 tab 10/12/15


 


DULoxetine [Cymbalta] 60 mg PO HS 10/30/15


 


Omeprazole 20 mg PO DAILY 11/03/17


 


Acetaminophen with Codeine 1 tab PO BID 05/27/18





[Tylenol with Codeine No. 3 300  





mg-30 mg]  


 


traMADol [Ultram] 50 mg PO Q8 #10 tab 05/27/18














- Allergies


Allergies/Adverse Reactions: 


 Allergies











Allergy/AdvReac Type Severity Reaction Status Date / Time


 


penicillin G Allergy  RASH Verified 09/27/17 11:19


 


vancomycin Allergy  RASH Verified 09/27/17 11:19














Review of Systems


ROS Statement: Except As Marked, All Systems Reviewed And Found Negative (As 

per HPI, otherwise negative)


Genitourinary Female: Negative for: Incontinence


Musculoskeletal: Positive for: Back Pain





Physical Exam





- Reviewed


Nursing Documentation Reviewed: Yes


Vital Signs Reviewed: Yes





- Physical Exam


Appears: Positive for: Non-toxic, Uncomfortable


Head Exam: Positive for: ATRAUMATIC, NORMAL INSPECTION, NORMOCEPHALIC


Skin: Positive for: Normal Color, Warm, Dry


Eye Exam: Positive for: EOMI, Normal appearance, PERRL


ENT: Positive for: Normal ENT Inspection


Neck: Positive for: Normal, Painless ROM


Cardiovascular/Chest: Positive for: Regular Rate, Rhythm.  Negative for: Murmur


Respiratory: Positive for: Normal Breath Sounds.  Negative for: Accessory 

Muscle Use, Respiratory Distress


Gastrointestinal/Abdominal: Positive for: Normal Exam, Soft.  Negative for: 

Tenderness


Back: Positive for: Normal Inspection


Extremity: Positive for: Tenderness (Bilateral leg raise tenderness)


Neurologic/Psych: Positive for: Alert, Oriented (x3)





- Laboratory Results


Result Diagrams: 


 05/27/18 22:05





 05/27/18 22:05





- ECG


O2 Sat by Pulse Oximetry: 100 (RA)


Pulse Ox Interpretation: Normal





Medical Decision Making


Medical Decision Making: 


Time: 21:55





Initial Impression: 46 y/o  female with acute exacerbation of chronic 

lower back pain





Plan:


BNP


Urine dipstick


CBC w/ differential


Hydromorphone 1mg IVP


Sodium chloride 1L IV


Ondansetron 4mg IV


Heplock insertion


Admit to hospital


Reevaluation





--Patient will be placed under observation for further pain management given 

second ED visit in 12 hours and worsening clinical status


--------------------------------------------------------------------------------

-----------------


Scribe Attestation:


Documented by Rebekah Francis acting as a scribe for Zana Contreras MD.


      


MD Cano Attestation:


All medical record entries made by the Jerome were at my direction and 

personally dictated by me. I have reviewed the chart and agree that the record 

accurately reflects my personal performance of the history, physical exam, 

medical decision making, and the department course for this patient. I have 

also personally directed, reviewed, and agree with the discharge instructions 

and disposition.








Disposition





- Clinical Impression


Clinical Impression: 


 Intractable low back pain








- Patient ED Disposition


Is Patient to be Admitted: Yes





- Disposition


Disposition Time: 22:00


Condition: FAIR





- Pt Status Changed To:


Hospital Disposition Of: Observation





- POA


Present On Arrival: None

## 2018-05-27 NOTE — RAD
PROCEDURE:  Radiographs of the Lumbar Spine.



HISTORY:

Back pain 



COMPARISON:

No prior.



FINDINGS:



BONES:

There is normal alignment of the lumbar vertebral bodies.  There is 

normal lumbar lordosis. There is no acute fracture, spondylolysis or 

spondylolisthesis.  Bone mineralization is normal.



DISC SPACES:

Status post discectomy and radiopaque endplate implant at L4-5. The 

remaining disc heights are maintained.



OTHER FINDINGS:

There are no pathologic soft tissue calcifications.  Both sacroiliac 

joints are normal.



IMPRESSION:

No acute fracture, spondylolysis or spondylolisthesis. 



Status post L4-5 discectomy with radiopaque endplate implants at 

L4-5.

## 2018-05-27 NOTE — ED PDOC
HPI: Back


Time Seen by Provider: 18 08:11


Chief Complaint (Nursing): Back Pain


Chief Complaint (Provider): Low Back Pain


History Per: Patient


History/Exam Limitations: no limitations


Onset/Duration Of Symptoms: Days (x2)


Current Symptoms Are (Timing): Still Present


Additional Complaint(s): 


46 y/o female with a pmhx of colon CA (with metastasis to liver) and chronic 

low back pain, who presents to ED for evaluation of low back pain x2 days. 

Patient reports pain radiates across lower back into hips bilaterally. She 

states pain is not improved by Tramadol or Tylenol with Codeine. Denies 

weakness or paresthesia. Also denies any urinary symptoms. 





PMD: Carlin Kaur








Past Medical History


Reviewed: Historical Data, Nursing Documentation, Vital Signs


Vital Signs: 


 Last Vital Signs











Temp  98.4 F   18 08:01


 


Pulse  92 H  18 08:01


 


Resp  20   18 08:01


 


BP  129/91 H  18 08:01


 


Pulse Ox  99   18 08:01














- Medical History


PMH: Anxiety, Depression, Fibromyalgia, GERD, HTN


   Denies: Chronic Kidney Disease


Other PMH: Colon CA (w/ metastasis to liver)





- Surgical History


Surgical History: Back Surgery, Endoscopy





- Family History


Family History: States: Unknown Family Hx





- Home Medications


Home Medications: 


 Ambulatory Orders











 Medication  Instructions  Recorded


 


amLODIPine [Norvasc] 10 mg PO DAILY #0 tab 10/12/15


 


DULoxetine [Cymbalta] 60 mg PO HS 10/30/15


 


Omeprazole 20 mg PO DAILY 17


 


Acetaminophen with Codeine 1 tab PO Q6 PRN 18





[Tylenol with Codeine #3 Tablet]  


 


Ondansetron [Zofran Tab] 4 mg PO Q6 PRN 18


 


Tramadol HCl [Tramadol HCl ER] 1 tab PO BID PRN 18


 


Zolpidem [Ambien] 10 mg PO HS 18


 


Cyclobenzaprine [Cyclobenzaprine 5 mg PO TID #21 tab 18





HCl]  


 


Cyclobenzaprine [Flexeril] 5 mg PO TID PRN #0 tab 18


 


DULoxetine [Cymbalta] 30 mg PO DAILY #0 ecc 18


 


DULoxetine [Cymbalta] 30 mg PO DAILY #7 ecc 18


 


Docusate [Colace] 100 mg PO BID #14 cap 18


 


Gabapentin 100 mg PO TID #21 tablet 18


 


Gabapentin [Neurontin] 100 mg PO TID  cap 18


 


Lidocaine 5% [Lidoderm] 1 ea TD DAILY  patch 18


 


Lidocaine 5% [Lidoderm] 1 ea TD DAILY  patch 18


 


Lidocaine 5% [Lidoderm] 1 ea TD ONCE #7 patch 18


 


Polyethylene Glycol 3350 [Miralax] 1 packet PO DAILY #14 ml 18


 


Lidocaine 5% [Lidoderm] 1 ea TD DAILY #10 patch 18


 


traMADol [Ultram] 50 mg PO Q8 #10 tab 18














- Allergies


Allergies/Adverse Reactions: 


 Allergies











Allergy/AdvReac Type Severity Reaction Status Date / Time


 


penicillin G Allergy  RASH Verified 17 11:19


 


vancomycin Allergy  RASH Verified 17 11:19














Review of Systems


ROS Statement: Except As Marked, All Systems Reviewed And Found Negative


Genitourinary Female: Negative for: Dysuria, Frequency, Incontinence, Hematuria


Musculoskeletal: Positive for: Back Pain (radiates to b/l hips)


Neurological: Negative for: Weakness, Other (paresthesia)





Physical Exam





- Reviewed


Nursing Documentation Reviewed: Yes


Vital Signs Reviewed: Yes





- Physical Exam


Appears: Positive for: Non-toxic, No Acute Distress


Back: Positive for: Muscle Spasm (paralumbar), Other (paralumbar tenderness).  

Negative for: Vertebral Tenderness


Neurologic/Psych: Positive for: Alert, Oriented.  Negative for: Motor/Sensory 

Deficits





- Laboratory Results


Result Diagrams: 


 18 09:25





 18 09:25





- ECG


O2 Sat by Pulse Oximetry: 99 (RA)


Pulse Ox Interpretation: Normal





- Progress


Re-evaluation Time: 10:27


Condition: Improved





Medical Decision Making


Medical Decision Makin:17


Plan:   


--Lidoderm 1%


--Toradol 30mg IVP


--Valum 5mg PO


--X-Ray LS spine


--Reevaluation


   


________________________________________________________________________________

_____________________________________________________


Scribe Attestation:   


Documented by Erik Saldivar, acting as a scribe for Gustavo Renteria MD.





Provider Scribe Attestation:


All medical record entries made by the Scribe were at my direction and 

personally dictated by me. I have reviewed the chart and agree that the record 

accurately reflects my personal performance of the history, physical exam, 

medical decision making, and the department course for this patient. I have 

also personally directed, reviewed, and agree with the discharge instructions 

and disposition. 








Disposition





- Clinical Impression


Clinical Impression: 


 Back pain








- Patient ED Disposition


Is Patient to be Admitted: No





- Disposition


Referrals: 


Carlin Kaur MD [Staff Provider] - 


Disposition: Routine/Home


Disposition Time: 10:28


Condition: IMPROVED


Prescriptions: 


Lidocaine 5% [Lidoderm] 1 ea TD DAILY #10 patch


traMADol [Ultram] 50 mg PO Q8 #10 tab


Instructions:  Low Back Pain  (DC)


Forms:  [facility] (English)

## 2018-05-27 NOTE — CP.PCM.HP
History of Present Illness





- History of Present Illness


History of Present Illness: 





48 yo ,f, PMhx/o HTN,  liver metastatic Colon CA  (currently on chemo # 42 

every 2 weeks), chronic back pain (managed by pain management) and Fibromylagia

, presents  for sendond time today to ED for persistent intractable lower back 

pain. Patient reports a hx/o chronic back pain, several surgeries in cervical 

spine and lumbar spine associated with scoliosis. Patient reports a new episode 

of lower back pain started yesterday 8 pm, sudden, not related with any 

physical activity, radiated to b/l hips and lower abdomen. Patient evaluated 

today in the morning in ED, discharged with tramadol, but reports that tramadol 

does not help and reports 3 nonbloddy vomiting in ED. She denies fever, weakness

, numbness, fall, urinary or fecal incontinence, saddle anesthesia, calf pain, 

cough, chest pain, SOB, hematuria, dysuria. Patient recently seen by pain 

management in clinic and PMD. Next chemo next Thursday in [place]. 


On evaluation patient reports feeling better after dilaudid medication.  








PMD: [facility]. DR Kaur. Last visit 5/22/18


Pain management: Dr. Winn


Hem-Onc: Dr. Sandoval  at [facility] 


PMHx: HTN, colon cancer with liver mets, chronic back pain, HTN


Meds: as per med rec


PsurgHx: C6 fusion, Colon resection 2015  and b/l oopherectomy, 

radioembolization of liver


SocialHx: denies ETOH/tobacco/drug abuse


FamilyHx: DM, HTN


Next of kin: daughter Peter


Code status: full code





ED course:


VS: BP: 155/95


Labs: CBC ok. CMP normal except Glucose [id number]


Imaging: Lumbar XR: no acute fracture. s/p discectomy radiopaque endplate 

implant L4-L5


Meds:  dilaudid 2 mg IV, Zofran 4 mg IV, IV fluids SN 1 L











Present on Admission





- Present on Admission


Any Indicators Present on Admission: No


History of DVT/PE: No


History of Uncontrolled Diabetes: No


Urinary Catheter: No


Decubitus Ulcer Present: No





Review of Systems





- Review of Systems


All systems: reviewed and no additional remarkable complaints except





- Gastrointestinal


Gastrointestinal: Vomiting





- Musculoskeletal


Additional comments: 





back pain 





Past Patient History





- Infectious Disease


Hx of Infectious Diseases: None





- Past Medical History & Family History


Past Medical History?: Yes





- Past Social History


Alcohol: None


Drugs: Denies





- CARDIAC


Hx Hypertension: Yes





- PULMONARY


Hx Respiratory Disorders: No





- NEUROLOGICAL


Hx Neurological Disorder: Yes





- HEENT


Hx HEENT Problems: No





- RENAL


Hx Chronic Kidney Disease: No





- ENDOCRINE/METABOLIC


Hx Endocrine Disorders: No





- INTEGUMENTARY


Hx Dermatological Problems: No





- GENITOURINARY/GYNECOLOGICAL


Hx Genitourinary Disorders: No





- PSYCHIATRIC


Hx Anxiety: Yes


Hx Depression: Yes





- SURGICAL HISTORY


Other/Comment: STEROID INJECTION;SPINAL SURGERY-FUSION-CERVICAL 6;CERVICAL 5 

REPLACEMENT;COLONOSCOPY;ENDOSCOPY;COLON RESECTION;REMOVAL OF BILATERSAL OVARIES





- ANESTHESIA


Hx Anesthesia: Yes


Hx Anesthesia Reactions: No


Hx Malignant Hyperthermia: No





Meds


Allergies/Adverse Reactions: 


 Allergies











Allergy/AdvReac Type Severity Reaction Status Date / Time


 


penicillin G Allergy  RASH Verified 09/27/17 11:19


 


vancomycin Allergy  RASH Verified 09/27/17 11:19














Physical Exam





- Constitutional


Appears: No Acute Distress





- Head Exam


Head Exam: ATRAUMATIC, NORMOCEPHALIC





- Eye Exam


Eye Exam: Normal appearance





- ENT Exam


ENT Exam: Mucous Membranes Moist





- Respiratory Exam


Respiratory Exam: Clear to Auscultation Bilateral.  absent: Rhonchi, Wheezes





- Cardiovascular Exam


Cardiovascular Exam: REGULAR RHYTHM, +S1, +S2





- GI/Abdominal Exam


GI & Abdominal Exam: Normal Bowel Sounds, Soft.  absent: Tenderness


Additional comments: 





transverse RUQ surgical scar 





- Extremities Exam


Extremities exam: Positive for: normal inspection.  Negative for: pedal edema





- Back Exam


Back exam: paraspinal tenderness, vertebral tenderness (over L4-L5)


Additional comments: 





LE: muscle strenght 5/5. patellar DTR 2+, sensation intact


b/l lasegue( straight leg raise ) +





- Neurological Exam


Neurological exam: Alert, Oriented x3





- Skin


Skin Exam: Rash


Additional comments: 





dark skin chemo rash b/l hand and feet





Results





- Vital Signs


Recent Vital Signs: 





 Last Vital Signs











Temp  98.4 F   05/27/18 23:19


 


Pulse  86   05/27/18 23:19


 


Resp  16   05/27/18 23:19


 


BP  144/79   05/27/18 23:19


 


Pulse Ox  98   05/27/18 23:19














- Labs


Result Diagrams: 


 05/27/18 22:05





 05/27/18 22:05


Labs: 





 Laboratory Results - last 24 hr











  05/27/18 05/27/18





  22:05 22:05


 


WBC  9.1 


 


RBC  5.37 H 


 


Hgb  15.4 


 


Hct  45.9 


 


MCV  85.6 


 


MCH  28.8 


 


MCHC  33.6 


 


RDW  15.9 H 


 


Plt Count  210 


 


MPV  9.1 


 


Neut % (Auto)  69.4 


 


Lymph % (Auto)  15.5 L 


 


Mono % (Auto)  13.4 H 


 


Eos % (Auto)  1.3 


 


Baso % (Auto)  0.4 


 


Neut # (Auto)  6.3 


 


Lymph # (Auto)  1.4 


 


Mono # (Auto)  1.2 H 


 


Eos # (Auto)  0.1 


 


Baso # (Auto)  0.0 


 


Sodium   137


 


Potassium   4.1


 


Chloride   95 L


 


Carbon Dioxide   22


 


Anion Gap   24 H


 


BUN   11


 


Creatinine   0.5 L


 


Est GFR ( Amer)   > 60


 


Est GFR (Non-Af Amer)   > 60


 


Random Glucose   206 H


 


Calcium   10.0














Assessment & Plan





- Assessment and Plan (Free Text)


Plan: 





Assessment/Plan


48 yo ,f, PMhx/o HTN,  liver metastatic Colon CA  (currently on chemo # 42 

every 2 weeks), chronic back pain (managed by pain management) and Fibromylagia 

admitted for intractable lower back pain. 





1) Chronic Lower back pain 


-intractable 


-s/p discectomy L4-L5 in  2008


-under pain management Dr Winn


-2 ED visits today, no tolerating Oral intake


-Lumbar XR: no acute fracture. s/p discectomy radiopaque endplate implant L4-L5


-MRI lumbar spine: 3/2/18: Mild degenerative spondylosis L3-L4 level with broad-

based disc bulge and small proximal left foraminal protrusion component. Mild 

multilevel facet arthropathy as above. 


-IV fluids, Zofran


-Dilaudid 1mg Q6h PRN pain


-morphine 2 mg Q4h 


-c/w Tylenol 3


-tramadol hold due to vomiting. 


-Pain management consult suggested





2) Liver metastatic Colon CA


diagnosed 2015


-currently on chemo, session 43 next week


-s/p radiotherapy


-Hem-Onc Dr. Sandoval  at [facility]





3) fibromyalgia


-controlled


-pain management


-c/w Cymbalta





4) HTN


-controlled


-c/w amlodipine





5) DVT Prophylaxis 


-lovenox 40 mg sc daily

## 2018-05-28 LAB
ALBUMIN SERPL-MCNC: 4.5 G/DL (ref 3.5–5)
ALBUMIN/GLOB SERPL: 1.1 {RATIO} (ref 1–2.1)
ALT SERPL-CCNC: 95 U/L (ref 9–52)
APTT BLD: 34.4 SECONDS (ref 25.6–37.1)
AST SERPL-CCNC: 68 U/L (ref 14–36)
BACTERIA #/AREA URNS HPF: (no result) /[HPF]
BASOPHILS # BLD AUTO: 0 K/UL (ref 0–0.2)
BASOPHILS NFR BLD: 0.3 % (ref 0–2)
BILIRUB UR-MCNC: NEGATIVE MG/DL
BUN SERPL-MCNC: 9 MG/DL (ref 7–17)
CALCIUM SERPL-MCNC: 9.8 MG/DL (ref 8.4–10.2)
COLOR UR: YELLOW
EOSINOPHIL # BLD AUTO: 0.1 K/UL (ref 0–0.7)
EOSINOPHIL NFR BLD: 1 % (ref 0–4)
ERYTHROCYTE [DISTWIDTH] IN BLOOD BY AUTOMATED COUNT: 15.9 % (ref 11.5–14.5)
GFR NON-AFRICAN AMERICAN: > 60
GLUCOSE UR STRIP-MCNC: >=500 MG/DL
HGB BLD-MCNC: 14.7 G/DL (ref 12–16)
INR PPP: 1.2 (ref 0.9–1.2)
LEUKOCYTE ESTERASE UR-ACNC: (no result) LEU/UL
LYMPHOCYTES # BLD AUTO: 1 K/UL (ref 1–4.3)
LYMPHOCYTES NFR BLD AUTO: 12.4 % (ref 20–40)
MCH RBC QN AUTO: 28.6 PG (ref 27–31)
MCHC RBC AUTO-ENTMCNC: 33.3 G/DL (ref 33–37)
MCV RBC AUTO: 85.9 FL (ref 81–99)
MONOCYTES # BLD: 1.3 K/UL (ref 0–0.8)
MONOCYTES NFR BLD: 15.9 % (ref 0–10)
NEUTROPHILS # BLD: 5.9 K/UL (ref 1.8–7)
NEUTROPHILS NFR BLD AUTO: 70.4 % (ref 50–75)
NRBC BLD AUTO-RTO: 0.1 % (ref 0–0)
PH UR STRIP: 6 [PH] (ref 5–8)
PLATELET # BLD: 177 K/UL (ref 130–400)
PMV BLD AUTO: 9.3 FL (ref 7.2–11.7)
PROT UR STRIP-MCNC: NEGATIVE MG/DL
PROTHROMBIN TIME: 13.2 SECONDS (ref 9.8–13.1)
RBC # BLD AUTO: 5.14 MIL/UL (ref 3.8–5.2)
RBC # UR STRIP: NEGATIVE /UL
SP GR UR STRIP: 1.01 (ref 1–1.03)
SQUAMOUS EPITHIAL: 1 /HPF (ref 0–5)
URINE CLARITY: CLEAR
UROBILINOGEN UR-MCNC: (no result) MG/DL (ref 0.2–1)
WBC # BLD AUTO: 8.4 K/UL (ref 4.8–10.8)

## 2018-05-28 RX ADMIN — HYDROMORPHONE HYDROCHLORIDE PRN MG: 1 INJECTION, SOLUTION INTRAMUSCULAR; INTRAVENOUS; SUBCUTANEOUS at 14:07

## 2018-05-28 RX ADMIN — PANTOPRAZOLE SODIUM SCH MG: 40 TABLET, DELAYED RELEASE ORAL at 08:32

## 2018-05-28 RX ADMIN — HYDROMORPHONE HYDROCHLORIDE PRN MG: 1 INJECTION, SOLUTION INTRAMUSCULAR; INTRAVENOUS; SUBCUTANEOUS at 19:59

## 2018-05-28 NOTE — CP.PCM.PN
Subjective





- Date & Time of Evaluation


Date of Evaluation: 05/28/18


Time of Evaluation: 08:35





- Subjective


Subjective: 





46 y/o M seen and examined by bedside. Pt reports her low back pain has been 

improving with Dilauid. No more nausea or vomiting episodes. Pt afebrile, 

tolerating PO.








Objective





- Vital Signs/Intake and Output


Vital Signs (last 24 hours): 


 











Temp Pulse Resp BP Pulse Ox


 


 97.9 F   86   18   142/87   98 


 


 05/28/18 08:35  05/28/18 08:35  05/28/18 08:35  05/28/18 08:35  05/28/18 08:35











- Medications


Medications: 


 Current Medications





Acetaminophen (Tylenol 325mg Tab)  650 mg PO Q6 PRN


   PRN Reason: Pain, Mild (1-3)


Acetaminophen (Tylenol 325mg Tab)  650 mg PO Q6 PRN


   PRN Reason: Fever >100.4 F


Acetaminophen/Codeine Phosphate (Tylenol/Codeine 300 Mg/30 Mg)  1 tab PO BID [facility]


   Last Admin: 05/28/18 08:31 Dose:  1 tab


Amlodipine Besylate (Norvasc)  10 mg PO DAILY [facility]


   Last Admin: 05/28/18 08:32 Dose:  10 mg


Duloxetine HCl (Cymbalta)  60 mg PO [facility]


Enoxaparin Sodium (Lovenox)  40 mg SC DAILY [facility]


   PRN Reason: Protocol


   Last Admin: 05/28/18 08:32 Dose:  40 mg


Hydromorphone HCl (Dilaudid)  1 mg IVP Q6 PRN


   PRN Reason: Pain, severe (8-10)


   Last Admin: 05/28/18 04:12 Dose:  1 mg


Morphine Sulfate (Morphine)  2 mg IVP Q4 PRN


   PRN Reason: Pain, moderate (4-7)


Ondansetron HCl (Zofran Inj)  4 mg IVP Q6 PRN


   PRN Reason: Nausea/Vomiting


   Last Admin: 05/28/18 04:18 Dose:  4 mg


Pantoprazole Sodium (Protonix Ec Tab)  40 mg PO DAILY [facility]


   Last Admin: 05/28/18 08:32 Dose:  40 mg











- Labs


Labs: 


 





 05/28/18 05:45 





 05/28/18 05:45 











- Constitutional


Appears: No Acute Distress





- Head Exam


Head Exam: NORMAL INSPECTION





- Eye Exam


Eye Exam: EOMI, Normal appearance





- ENT Exam


ENT Exam: Mucous Membranes Moist





- Neck Exam


Neck Exam: Full ROM.  absent: Lymphadenopathy





- Respiratory Exam


Respiratory Exam: Clear to Ausculation Bilateral, NORMAL BREATHING PATTERN





- Cardiovascular Exam


Cardiovascular Exam: +S1, +S2





- GI/Abdominal Exam


GI & Abdominal Exam: Soft, Normal Bowel Sounds.  absent: Distended, Guarding, 

Tenderness





- Extremities Exam


Extremities Exam: Full ROM, Normal Inspection.  absent: Calf Tenderness, 

Tenderness


Additional comments: 





Bilateral Lower Extremities: Strenght 5/5. patellar DTR 2+ b/l, SILT b/l. 

Straight leg elevation test positive b/l.








Assessment and Plan





- Assessment and Plan (Free Text)


Assessment: 





46 y/o F with a PMhx of  HTN,  liver metastatic Colon CA  (on chemotherapy), 

chronic back pain and Fibromylagia admitted for intractable lower back pain. 





PLAN: 





Chronic Lower back pain 


-Improving, intractable, s/p discectomy L4-L5 in  2008


-Lumbar XR: no acute fracture. s/p discectomy radiopaque endplate implant L4-L5


-MRI lumbar spine on 3/2/18: Mild degenerative spondylosis L3-L4 level with 

broad-based disc bulge and small proximal left foraminal protrusion component. 

Mild multilevel facet arthropathy. 


-IV fluids, Zofran


-Dilaudid 1mg Q4h PRN, Tylenol 3and lidoderm for pain management.


-D/C morphine 2 mg Q4h 


-As per Dr Winn, pain management, will inject locally tomorrow. Coagulation 

profile ordered, Lovenox on HOLD.





Liver metastatic Colon CA


-diagnosed 2015


-currently on chemo, session 43 next week


-s/p radiotherapy


-Hem-Onc Dr. Sandoval  at [facility]





Fibromyalgia


-controlled


-pain management


-c/w Cymbalta





HTN


-controlled


-c/w amlodipine





Prophylaxis 


-lovenox 40 mg sc daily on HOLD for tomorrow minor procedure, injection 

administration.


-Pantoprazole 40mg PO daily

## 2018-05-29 VITALS
OXYGEN SATURATION: 99 % | DIASTOLIC BLOOD PRESSURE: 83 MMHG | SYSTOLIC BLOOD PRESSURE: 124 MMHG | TEMPERATURE: 98.7 F | HEART RATE: 79 BPM | RESPIRATION RATE: 18 BRPM

## 2018-05-29 RX ADMIN — PANTOPRAZOLE SODIUM SCH: 40 TABLET, DELAYED RELEASE ORAL at 17:20

## 2018-05-29 RX ADMIN — PANTOPRAZOLE SODIUM SCH: 40 TABLET, DELAYED RELEASE ORAL at 10:06

## 2018-05-29 RX ADMIN — PANTOPRAZOLE SODIUM SCH MG: 40 TABLET, DELAYED RELEASE ORAL at 16:46

## 2018-05-29 NOTE — CP.PCM.PN
Subjective





- Date & Time of Evaluation


Date of Evaluation: 05/29/18


Time of Evaluation: 07:45





- Subjective


Subjective: 


48 yo woman well known to me from pain clinic has colon CA undergoing chemo, is 

s/p left shoulder injection last Friday and had to be admitted over the weekend 

due to sudden increase of lower back pain.  She has complained of lower back 

pain over the last few weeks.  MRI in March didn't show significant stenotic 

lesions at the time.  Pain had been in baseline until the weekend.  





The pain is across the waist, axial, without radiation down the legs.  X-ray 

didn't reveal new fractures.  





Objective





- Vital Signs/Intake and Output


Vital Signs (last 24 hours): 


 











Temp Pulse Resp BP Pulse Ox


 


 97.9 F   81   19   132/85   100 


 


 05/29/18 07:51  05/29/18 07:51  05/29/18 07:51  05/29/18 07:51  05/29/18 07:51











- Medications


Medications: 


 Current Medications





Acetaminophen/Codeine Phosphate (Tylenol/Codeine 300 Mg/30 Mg)  1 tab PO Q6H PRN


   PRN Reason: Pain, moderate (4-7)


   Last Admin: 05/28/18 15:19 Dose:  1 tab


Amlodipine Besylate (Norvasc)  10 mg PO DAILY [facility]


   Last Admin: 05/28/18 08:32 Dose:  10 mg


Duloxetine HCl (Cymbalta)  60 mg PO HS [facility]


   Last Admin: 05/28/18 21:19 Dose:  60 mg


Hydromorphone HCl (Dilaudid)  1 mg IVP Q4 PRN


   PRN Reason: Pain, severe (8-10)


Sodium Chloride (Sodium Chloride 0.9%)  1,000 mls @ 125 mls/hr IV .Q8H JAM


   Stop: 05/30/18 00:42


   Last Admin: 05/29/18 01:00 Dose:  125 mls/hr


Lidocaine (Lidoderm)  1 ea TD DAILY [facility]


   Last Admin: 05/28/18 15:28 Dose:  1 ea


Ondansetron HCl (Zofran Odt)  4 mg PO Q8H PRN


   PRN Reason: Nausea/Vomiting


   Last Admin: 05/29/18 01:13 Dose:  4 mg


Pantoprazole Sodium (Protonix Ec Tab)  40 mg PO DAILY [facility]


   Last Admin: 05/28/18 08:32 Dose:  40 mg











- Labs


Labs: 


 





 05/28/18 05:45 





 05/28/18 05:45 





 











PT  13.2 Seconds (9.8-13.1)  H  05/28/18  13:34    


 


INR  1.2  (0.9-1.2)   05/28/18  13:34    


 


APTT  34.4 Seconds (25.6-37.1)   05/28/18  13:34    














Assessment and Plan


(1) Back pain


Assessment & Plan: 


48 yo woman w/ acute on chronic lower back pain, likely sacroiliac and facet in 

origin. 





- will do sacroiliac joint and facet blocks later today


- continue current regimen


- patient can return to home regimen upon discharge


Status: Acute

## 2018-05-29 NOTE — OP
PROCEDURE DATE:  05/29/2018



PREOPERATIVE DIAGNOSIS:  Bilateral sacroiliitis.



POSTOPERATIVE DIAGNOSIS:  Bilateral sacroiliitis.



PROCEDURE:  Bilateral sacroiliac joint steroid injection and bilateral L5

medial branch nerve injection.



SURGEON:  Sonido Winn MD



TYPE OF ANESTHESIA:  Monitored anesthesia care.



ANESTHESIOLOGIST:  Gurpreet Fritz MD



COMPLICATIONS:  None.



SPECIMENS:  None.



DESCRIPTION OF PROCEDURE:  As follows.



After we had a discussion of the procedure with the patient including its

risks, benefits, alternative, outcome data, possibility of no effect or

increased pain, the patient consented to the procedure.  She denied any

recent infections, bleeding tendencies or being on anticoagulants.  A

decision was then made to proceed to the OR.



The patient was placed on the fluoroscopy table in a prone position with 2

pillows underneath her abdomen.  The back was prepped and draped in the

usual sterile fashion and sterile technique was adhered to during the

entire procedure.  The sacroiliac joint was first visualized on the

anterior posterior view on the right side.  The target is at the inferior

pole of the posterior opening to the sacroiliac joint.  This was

differentiated from the anterior opening by turning the fluoroscopy towards

the right at approximately 10 degrees oblique angle.  The skin overlying

the target was then infiltrated with 1% lidocaine using a 25-gauge needle. 

Subsequently, a 22-gauge 3.5-inch spinal needle was then incrementally

advanced under fluoroscopic guidance until tip of needle walked into the

joint capsule.  This was confirmed by injecting approximately 0.5 mL of

Isovue contrast, which was spread of the joint line.  After appropriate

placement of the needle, approximately 3 mL of 0.5% Marcaine and

Depo-Medrol mixture was injected.  The needle was then removed.

















The right medial branch nerve was then targeted at the right sacroiliac. 

The skin overlying this area was then infiltrated with 1% lidocaine using a

25-gauge needle.  Subsequently, a 22-gauge 3.5-inch spinal needle was then

incrementally advanced under fluoroscopic guidance until the tip of the

needle made bony contact with the target.  After satisfactory positioning

of the needle, approximately 3 mL of 0.5% Marcaine and Depo-Medrol mixture

was injected as well.  The needle was then removed and the same exact

procedure was performed on the contralateral left side at the sacroiliac

joint and left L5 medial branch nerve.  At the end of the case, the

patient's back was cleaned and dried, and bandages were applied.



The patient was then transferred to recovery area in good condition without

any signs of CNS toxicity or any neurological deficits.  She will have a

followup in office in approximately 2 to 4 weeks.





__________________________________________

En-Berhane Winn MD





DD:  05/29/2018 11:30:12

DT:  05/29/2018 11:48:55

Job # [id number]

## 2018-05-29 NOTE — CP.PCM.PN
Subjective





- Date & Time of Evaluation


Date of Evaluation: 05/29/18


Time of Evaluation: 07:30





- Subjective


Subjective: 





48 y/o F seen and examined by bedside. Today, pt c/o low back pain, 7/10 

intensity now which is an improvement since admission. Pt states her pain 

improves with medications, last doese last night aroung 8 pm. No nausea or 

vomiting. Pt afebrile, tolerated PO yesterday. Pt aware Epidural injection nad 

nerve block will be performed today.


 





Objective





- Vital Signs/Intake and Output


Vital Signs (last 24 hours): 


 











Temp Pulse Resp BP Pulse Ox


 


 97.9 F   81   19   132/85   100 


 


 05/29/18 07:51  05/29/18 07:51  05/29/18 07:51  05/29/18 07:51  05/29/18 07:51











- Medications


Medications: 


 Current Medications





Acetaminophen/Codeine Phosphate (Tylenol/Codeine 300 Mg/30 Mg)  1 tab PO Q6H PRN


   PRN Reason: Pain, moderate (4-7)


   Last Admin: 05/28/18 15:19 Dose:  1 tab


Amlodipine Besylate (Norvasc)  10 mg PO DAILY [facility]


   Last Admin: 05/28/18 08:32 Dose:  10 mg


Duloxetine HCl (Cymbalta)  60 mg PO HS [facility]


   Last Admin: 05/28/18 21:19 Dose:  60 mg


Hydromorphone HCl (Dilaudid)  1 mg IVP Q4 PRN


   PRN Reason: Pain, severe (8-10)


   Last Admin: 05/29/18 08:16 Dose:  1 mg


Sodium Chloride (Sodium Chloride 0.9%)  1,000 mls @ 125 mls/hr IV .Q8H JAM


   Stop: 05/30/18 00:42


   Last Admin: 05/29/18 01:00 Dose:  125 mls/hr


Lidocaine (Lidoderm)  1 ea TD DAILY [facility]


   Last Admin: 05/28/18 15:28 Dose:  1 ea


Ondansetron HCl (Zofran Odt)  4 mg PO Q8H PRN


   PRN Reason: Nausea/Vomiting


   Last Admin: 05/29/18 01:13 Dose:  4 mg


Pantoprazole Sodium (Protonix Ec Tab)  40 mg PO DAILY [facility]


   Last Admin: 05/28/18 08:32 Dose:  40 mg











- Labs


Labs: 


 





 05/28/18 05:45 





 05/28/18 05:45 





 











PT  13.2 Seconds (9.8-13.1)  H  05/28/18  13:34    


 


INR  1.2  (0.9-1.2)   05/28/18  13:34    


 


APTT  34.4 Seconds (25.6-37.1)   05/28/18  13:34    














- Additional Findings


Additional findings: 





- Constitutional


Appears: No Acute Distress





- Head Exam


Head Exam: NORMAL INSPECTION





- Eye Exam


Eye Exam: EOMI, Normal appearance





- ENT Exam


ENT Exam: Mucous Membranes Moist





- Neck Exam


Neck Exam: Full ROM.  absent: Lymphadenopathy





- Respiratory Exam


Respiratory Exam: Clear to Ausculation Bilateral, NORMAL BREATHING PATTERN





- Cardiovascular Exam


Cardiovascular Exam: +S1, +S2





- GI/Abdominal Exam


GI & Abdominal Exam: Soft, Normal Bowel Sounds.  absent: Distended, Guarding, 

Tenderness





- Extremities Exam


Extremities Exam: Full ROM, Normal Inspection.  absent: Calf Tenderness, 

Tenderness


Additional comments: 





Bilateral Lower Extremities: Strenght 5/5. patellar DTR 2+ b/l, SILT b/l. 

Straight leg elevation test positive b/l.





Assessment and Plan





- Assessment and Plan (Free Text)


Assessment: 








48 y/o F with a PMhx of  HTN,  liver metastatic Colon CA  (on chemotherapy), 

chronic back pain and Fibromylagia admitted for intractable lower back pain. 








PLAN: 








Chronic Lower back pain 


-Improving, intractable, s/p discectomy L4-L5 in  2008


-Lumbar XR: no acute fracture. s/p discectomy radiopaque endplate implant L4-L5


-MRI lumbar spine on 3/2/18: Mild degenerative spondylosis L3-L4 level with 

broad-based disc bulge and small proximal left foraminal protrusion component. 


-Dilaudid 1mg Q4h PRN, Tylenol 3 and lidoderm for pain management.


-Sacroiliac joint and facet blocks to be performed later today.


-Coagulation profile unremarkable.


-Lovenox on HOLD.





Liver metastatic Colon CA


-diagnosed 2015


-currently on chemo, session 43 next week


-s/p radiotherapy


-Hem-Onc Dr. Sandoval  at [facility]





Elevated glucose level.


-Serum glucose 206 yesterday, 168 today. Both elevated.


-HbA1c 7.7-elevated.


-F/U as outpatient. 





Fibromyalgia


-controlled


-pain management


-c/w Cymbalta





HTN


-controlled


-c/w amlodipine





Prophylaxis 


-lovenox 40 mg sc daily on HOLD as per anesthesia procedure.


-Pantoprazole 40mg PO daily

## 2018-05-29 NOTE — RAD
PROCEDURE:  Intraoperative Fluoroscopy. 



HISTORY:

OR



FINDINGS:

Fluoroscopic assistance was provided for epidural injection. Please 

refer to the operative report from ALEXANDER Forde.  Total 

fluoroscopic time (continuous mode) utilized during the procedure 

28.2 (seconds).

## 2018-07-11 ENCOUNTER — HOSPITAL ENCOUNTER (EMERGENCY)
Dept: HOSPITAL 14 - H.ER | Age: 48
Discharge: HOME | End: 2018-07-11
Payer: MEDICAID

## 2018-07-11 VITALS — RESPIRATION RATE: 19 BRPM | HEART RATE: 81 BPM

## 2018-07-11 VITALS — DIASTOLIC BLOOD PRESSURE: 88 MMHG | OXYGEN SATURATION: 100 % | SYSTOLIC BLOOD PRESSURE: 127 MMHG

## 2018-07-11 VITALS — TEMPERATURE: 98 F

## 2018-07-11 VITALS — BODY MASS INDEX: 31.9 KG/M2

## 2018-07-11 DIAGNOSIS — Z85.038: ICD-10-CM

## 2018-07-11 DIAGNOSIS — R10.9: Primary | ICD-10-CM

## 2018-07-11 DIAGNOSIS — Z88.0: ICD-10-CM

## 2018-07-11 DIAGNOSIS — M79.7: ICD-10-CM

## 2018-07-11 DIAGNOSIS — Z86.59: ICD-10-CM

## 2018-07-11 DIAGNOSIS — E11.9: ICD-10-CM

## 2018-07-11 DIAGNOSIS — I10: ICD-10-CM

## 2018-07-11 LAB
ALBUMIN SERPL-MCNC: 4.5 [, G/DL] (ref 3.5–5)
ALBUMIN/GLOB SERPL: 1.1 [,] (ref 1–2.1)
ALT SERPL-CCNC: 39 [, U/L] (ref 9–52)
AST SERPL-CCNC: 47 [, U/L] (ref 14–36)
BACTERIA #/AREA URNS HPF: (no result) [,]
BASOPHILS # BLD AUTO: 0 [, K/UL] (ref 0–0.2)
BASOPHILS NFR BLD: 0.3 [, %] (ref 0–2)
BILIRUB UR-MCNC: NEGATIVE [,]
BUN SERPL-MCNC: 5 [, MG/DL] (ref 7–17)
CALCIUM SERPL-MCNC: 10.3 [, MG/DL] (ref 8.4–10.2)
COLOR UR: (no result) [,]
EOSINOPHIL # BLD AUTO: 0.1 [, K/UL] (ref 0–0.7)
EOSINOPHIL NFR BLD: 1.7 [, %] (ref 0–4)
ERYTHROCYTE [DISTWIDTH] IN BLOOD BY AUTOMATED COUNT: 15.4 [, %] (ref 11.5–14.5)
GFR NON-AFRICAN AMERICAN: > 60 [,]
GLUCOSE UR STRIP-MCNC: (no result) [, MG/DL]
HGB BLD-MCNC: 14 [, G/DL] (ref 12–16)
LEUKOCYTE ESTERASE UR-ACNC: (no result) [, LEU/UL]
LIPASE SERPL-CCNC: 50 [, U/L] (ref 23–300)
LYMPHOCYTES # BLD AUTO: 1.1 [, K/UL] (ref 1–4.3)
LYMPHOCYTES NFR BLD AUTO: 13.9 [, %] (ref 20–40)
MCH RBC QN AUTO: 29.2 [, PG] (ref 27–31)
MCHC RBC AUTO-ENTMCNC: 33.9 [, G/DL] (ref 33–37)
MCV RBC AUTO: 86.3 [, FL] (ref 81–99)
MONOCYTES # BLD: 0.8 [, K/UL] (ref 0–0.8)
MONOCYTES NFR BLD: 10.8 [, %] (ref 0–10)
NEUTROPHILS # BLD: 5.6 [, K/UL] (ref 1.8–7)
NEUTROPHILS NFR BLD AUTO: 73.3 [, %] (ref 50–75)
NRBC BLD AUTO-RTO: 0.2 [, %] (ref 0–0)
PH UR STRIP: 7 [,] (ref 5–8)
PLATELET # BLD: 225 [, K/UL] (ref 130–400)
PMV BLD AUTO: 9 [, FL] (ref 7.2–11.7)
PROT UR STRIP-MCNC: NEGATIVE [, MG/DL]
RBC # BLD AUTO: 4.81 [, MIL/UL] (ref 3.8–5.2)
RBC # UR STRIP: NEGATIVE [,]
SP GR UR STRIP: < 1.005 [,] (ref 1–1.03)
SQUAMOUS EPITHIAL: < 1 [, /HPF] (ref 0–5)
URINE CLARITY: CLEAR [,]
UROBILINOGEN UR-MCNC: (no result) [, MG/DL] (ref 0.2–1)
WBC # BLD AUTO: 7.6 [, K/UL] (ref 4.8–10.8)

## 2018-07-11 PROCEDURE — 85025 COMPLETE CBC W/AUTO DIFF WBC: CPT

## 2018-07-11 PROCEDURE — 96374 THER/PROPH/DIAG INJ IV PUSH: CPT

## 2018-07-11 PROCEDURE — 81003 URINALYSIS AUTO W/O SCOPE: CPT

## 2018-07-11 PROCEDURE — 96375 TX/PRO/DX INJ NEW DRUG ADDON: CPT

## 2018-07-11 PROCEDURE — 99285 EMERGENCY DEPT VISIT HI MDM: CPT

## 2018-07-11 PROCEDURE — 80053 COMPREHEN METABOLIC PANEL: CPT

## 2018-07-11 PROCEDURE — 74177 CT ABD & PELVIS W/CONTRAST: CPT

## 2018-07-11 PROCEDURE — 96376 TX/PRO/DX INJ SAME DRUG ADON: CPT

## 2018-07-11 PROCEDURE — 83690 ASSAY OF LIPASE: CPT

## 2018-07-11 NOTE — ED PDOC
HPI: Abdomen


Time Seen by Provider: 18 07:04


Chief Complaint (Nursing): Abdominal Pain


Chief Complaint (Provider): Abdominal Pain, Lower Back Pain


History Per: Patient


History/Exam Limitations: no limitations


Onset/Duration Of Symptoms: Days (x2)


Current Symptoms Are (Timing): Still Present


Additional Complaint(s): 


49 y/o female with a PMHx of HTN, diabetes, fibromyalgia, and colon cancer with 

mets to liver, presenting for evaluation of abdominal pain and lower back pain 

x2 days. Patient states her pain began yesterday and have been constant since 

onset. She also confirms nausea, chills, and shortness of breath, but denies 

any vomiting, fever, cough, dysuria, or hematuria. She reports taking Tramadol 

and Tylenol 3 for her pain yesterday and this morning without relief. 





PMD: Dr. Kaur








Past Medical History


Reviewed: Historical Data, Nursing Documentation, Vital Signs


Vital Signs: 


 Last Vital Signs











Temp  98.0 F   18 11:27


 


Pulse  78   18 11:27


 


Resp  20   18 09:32


 


BP  113/74   18 11:27


 


Pulse Ox  98   18 10:10














- Medical History


PMH: Anxiety, Back Problems, Depression, Diabetes, Fibromyalgia, GERD, HTN


   Denies: Chronic Kidney Disease


Other PMH: colon cancer (mets to liver)





- Surgical History


Surgical History: Back Surgery, Endoscopy





- Family History


Family History: States: Unknown Family Hx





- Home Medications


Home Medications: 


 Ambulatory Orders











 Medication  Instructions  Recorded


 


amLODIPine [Norvasc] 10 mg PO DAILY #0 tab 10/12/15


 


DULoxetine [Cymbalta] 60 mg PO HS 10/30/15


 


Omeprazole 20 mg PO DAILY 17


 


Acetaminophen with Codeine 1 tab PO BID 18





[Tylenol with Codeine #3 Tablet]  


 


traMADol [Ultram] 50 mg PO Q8 #10 tab 18


 


Naloxegol Oxalate [Movantik] 25 mg PO DAILY #15 tablet 18














- Allergies


Allergies/Adverse Reactions: 


 Allergies











Allergy/AdvReac Type Severity Reaction Status Date / Time


 


penicillin G Allergy  RASH Verified 17 11:19


 


vancomycin Allergy  RASH Verified 17 11:19














Review of Systems


ROS Statement: Except As Marked, All Systems Reviewed And Found Negative


Constitutional: Positive for: Chills.  Negative for: Fever


Respiratory: Positive for: Shortness of Breath.  Negative for: Cough


Gastrointestinal: Positive for: Nausea, Abdominal Pain.  Negative for: Vomiting


Genitourinary Female: Negative for: Dysuria, Hematuria


Musculoskeletal: Positive for: Back Pain





Physical Exam





- Reviewed


Nursing Documentation Reviewed: Yes


Vital Signs Reviewed: Yes





- Physical Exam


Appears: Positive for: In Acute Distress (moderate painful distress)


Respiratory: Positive for: Normal Breath Sounds.  Negative for: Respiratory 

Distress


Neurologic/Psych: Positive for: Alert, Oriented (x3)





- Laboratory Results


Result Diagrams: 


 18 07:45





 18 07:45





- ECG


O2 Sat by Pulse Oximetry: 98 (RA)


Pulse Ox Interpretation: Normal





- Progress


Re-evaluation Time: 10:00


Condition: Improved





Medical Decision Making


Medical Decision Makin:20


Impression: Back pain. Differential diagnoses include, but are not limited to 

musculoskeletal pain, UTI, kidney stones, colitis, and pancreatitis


Plan:


-CMP


-Lipase


-Urine pregnancy


-Urine dipstick


-CBC w/ differential


-Morphine 2mg IVP


-1LNS


-Pepcid 20mg IVP


-Toradol 30mg IVP


-Zofran 8mg IVP


-IV insertion


-Urinalysis


-Reevaluation





--------------------------------------------------------------------------------

-----------------


Scribe Attestation: 


Documented by Erik Saldivar, acting as a scribe for Gilda Diane MD. 





Provider Scribe Attestation:


All medical record entries made by the Scribe were at my direction and 

personally dictated by me. I have reviewed the chart and agree that the record 

accurately reflects my personal performance of the history, physical exam, 

medical decision making, and the department course for this patient. I have 

also personally directed, reviewed, and agree with the discharge instructions 

and disposition.





Ordered CT 


12.00 CT negative. patient still has slight back pain. Aware of constipation 

finding. Will d/c.











Disposition





- Clinical Impression


Clinical Impression: 


 Abdominal discomfort








- Patient ED Disposition


Is Patient to be Admitted: No


Doctor Will See Patient In The: Office


Counseled Patient/Family Regarding: Diagnosis





- Disposition


Referrals: 


[facility] [Outside]


Disposition: Routine/Home


Disposition Time: 12:09


Condition: IMPROVED


Prescriptions: 


Naloxegol Oxalate [Movantik] 25 mg PO DAILY #15 tablet


Instructions:  Low Back Pain  (DC)


Forms:  [facility] (English)





- POA


Present On Arrival: None

## 2018-07-11 NOTE — CT
Date of service: 



07/11/2018



PROCEDURE:  CT Abdomen and Pelvis with contrast



HISTORY:

left flank pain, blood in urine, colon ca with met



COMPARISON:

Comparison is made to the previous study dated 10/11/2015



TECHNIQUE:

Contrast dose: 95 cc of Omnipaque 300.



Axial and reformatted coronal and sagittal CT images of the abdomen 

and pelvis were obtained after IV contrast administration. 



Radiation dose:



Total exam DLP = 752.51 mGy-cm.



This CT exam was performed using one or more of the following dose 

reduction techniques: Automated exposure control, adjustment of the 

mA and/or kV according to patient size, and/or use of iterative 

reconstruction technique.



FINDINGS:



LOWER THORAX:

No evidence of acute pathology or pleural effusion. 



LIVER:

Again seen are foci of heterogeneous low-attenuation at the right 

liver lobe consistent with the patient's known history of liver 

metastasis. The portal vein is patent. 



GALLBLADDER AND BILE DUCTS:

The gallbladder is not visualized. 



PANCREAS:

Unremarkable. No gross lesion or ductal dilatation.



SPLEEN:

Unremarkable. 



ADRENALS:

Unremarkable. No mass. 



KIDNEYS AND URETERS:

Unremarkable. No hydronephrosis. No solid mass. 



VASCULATURE:

Unremarkable. No aortic aneurysm. 



BOWEL:

Mild-to-moderate constipation is noted. No evidence of obstructing 

mass in the bowel. No evidence of high-grade bowel obstruction.



APPENDIX:

No evidence of appendicitis. 



PERITONEUM:

Unremarkable. No free fluid. No free air. 



LYMPH NODES:

Unremarkable. No enlarged lymph nodes. 



BLADDER:

Unremarkable. 



REPRODUCTIVE:

Unremarkable. 



BONES:

No evidence of destructive bony lesion 



OTHER FINDINGS:

None.



IMPRESSION:

No CT evidence of acute pathology in the abdomen and pelvis.



Mild-to-moderate constipation.

## 2018-07-19 ENCOUNTER — HOSPITAL ENCOUNTER (OUTPATIENT)
Dept: HOSPITAL 14 - H.ER | Age: 48
Setting detail: OBSERVATION
LOS: 3 days | Discharge: HOME | End: 2018-07-22
Attending: FAMILY MEDICINE | Admitting: FAMILY MEDICINE
Payer: MEDICAID

## 2018-07-19 VITALS — BODY MASS INDEX: 31.9 KG/M2

## 2018-07-19 DIAGNOSIS — K21.9: ICD-10-CM

## 2018-07-19 DIAGNOSIS — M54.5: ICD-10-CM

## 2018-07-19 DIAGNOSIS — M16.0: ICD-10-CM

## 2018-07-19 DIAGNOSIS — E11.9: ICD-10-CM

## 2018-07-19 DIAGNOSIS — M79.7: ICD-10-CM

## 2018-07-19 DIAGNOSIS — I10: ICD-10-CM

## 2018-07-19 DIAGNOSIS — K59.00: ICD-10-CM

## 2018-07-19 DIAGNOSIS — Z79.899: ICD-10-CM

## 2018-07-19 DIAGNOSIS — F41.9: ICD-10-CM

## 2018-07-19 DIAGNOSIS — C78.7: ICD-10-CM

## 2018-07-19 DIAGNOSIS — Z85.038: ICD-10-CM

## 2018-07-19 DIAGNOSIS — M41.9: ICD-10-CM

## 2018-07-19 DIAGNOSIS — F32.9: ICD-10-CM

## 2018-07-19 DIAGNOSIS — Z92.21: ICD-10-CM

## 2018-07-19 DIAGNOSIS — G89.29: Primary | ICD-10-CM

## 2018-07-19 LAB
ALBUMIN SERPL-MCNC: 4.7 G/DL (ref 3.5–5)
ALBUMIN/GLOB SERPL: 1 {RATIO} (ref 1–2.1)
ALT SERPL-CCNC: 36 U/L (ref 9–52)
AST SERPL-CCNC: 58 U/L (ref 14–36)
BACTERIA #/AREA URNS HPF: (no result) /[HPF]
BASOPHILS # BLD AUTO: 0 K/UL (ref 0–0.2)
BASOPHILS NFR BLD: 0.4 % (ref 0–2)
BILIRUB UR-MCNC: NEGATIVE MG/DL
BUN SERPL-MCNC: 6 MG/DL (ref 7–17)
CALCIUM SERPL-MCNC: 10.6 MG/DL (ref 8.4–10.2)
COLOR UR: YELLOW
EOSINOPHIL # BLD AUTO: 0.1 K/UL (ref 0–0.7)
EOSINOPHIL NFR BLD: 1.1 % (ref 0–4)
ERYTHROCYTE [DISTWIDTH] IN BLOOD BY AUTOMATED COUNT: 14.4 % (ref 11.5–14.5)
GFR NON-AFRICAN AMERICAN: > 60
GLUCOSE UR STRIP-MCNC: 150 MG/DL
HGB BLD-MCNC: 14.2 G/DL (ref 12–16)
LEUKOCYTE ESTERASE UR-ACNC: (no result) LEU/UL
LIPASE SERPL-CCNC: 52 U/L (ref 23–300)
LYMPHOCYTES # BLD AUTO: 0.9 K/UL (ref 1–4.3)
LYMPHOCYTES NFR BLD AUTO: 14.1 % (ref 20–40)
MCH RBC QN AUTO: 29.6 PG (ref 27–31)
MCHC RBC AUTO-ENTMCNC: 34.4 G/DL (ref 33–37)
MCV RBC AUTO: 85.9 FL (ref 81–99)
MONOCYTES # BLD: 0.6 K/UL (ref 0–0.8)
MONOCYTES NFR BLD: 9.8 % (ref 0–10)
NEUTROPHILS # BLD: 4.8 K/UL (ref 1.8–7)
NEUTROPHILS NFR BLD AUTO: 74.6 % (ref 50–75)
NRBC BLD AUTO-RTO: 0.1 % (ref 0–0)
PH UR STRIP: 8 [PH] (ref 5–8)
PLATELET # BLD: 340 K/UL (ref 130–400)
PMV BLD AUTO: 9.1 FL (ref 7.2–11.7)
PROT UR STRIP-MCNC: NEGATIVE MG/DL
RBC # BLD AUTO: 4.79 MIL/UL (ref 3.8–5.2)
RBC # UR STRIP: NEGATIVE /UL
SP GR UR STRIP: 1.04 (ref 1–1.03)
SQUAMOUS EPITHIAL: 6 /HPF (ref 0–5)
URINE CLARITY: (no result)
UROBILINOGEN UR-MCNC: (no result) MG/DL (ref 0.2–1)
WBC # BLD AUTO: 6.4 K/UL (ref 4.8–10.8)

## 2018-07-19 PROCEDURE — 77002 NEEDLE LOCALIZATION BY XRAY: CPT

## 2018-07-19 PROCEDURE — 80053 COMPREHEN METABOLIC PANEL: CPT

## 2018-07-19 PROCEDURE — 36415 COLL VENOUS BLD VENIPUNCTURE: CPT

## 2018-07-19 PROCEDURE — 82948 REAGENT STRIP/BLOOD GLUCOSE: CPT

## 2018-07-19 PROCEDURE — 20610 DRAIN/INJ JOINT/BURSA W/O US: CPT

## 2018-07-19 PROCEDURE — 83690 ASSAY OF LIPASE: CPT

## 2018-07-19 PROCEDURE — 96360 HYDRATION IV INFUSION INIT: CPT

## 2018-07-19 PROCEDURE — 74177 CT ABD & PELVIS W/CONTRAST: CPT

## 2018-07-19 PROCEDURE — 85025 COMPLETE CBC W/AUTO DIFF WBC: CPT

## 2018-07-19 PROCEDURE — 81025 URINE PREGNANCY TEST: CPT

## 2018-07-19 PROCEDURE — 27096 INJECT SACROILIAC JOINT: CPT

## 2018-07-19 PROCEDURE — 99285 EMERGENCY DEPT VISIT HI MDM: CPT

## 2018-07-19 PROCEDURE — 81003 URINALYSIS AUTO W/O SCOPE: CPT

## 2018-07-19 RX ADMIN — STANDARDIZED SENNA CONCENTRATE AND DOCUSATE SODIUM SCH TAB: 8.6; 5 TABLET, FILM COATED ORAL at 21:22

## 2018-07-19 RX ADMIN — INSULIN LISPRO SCH: 100 INJECTION, SOLUTION INTRAVENOUS; SUBCUTANEOUS at 20:39

## 2018-07-19 RX ADMIN — INSULIN LISPRO SCH: 100 INJECTION, SOLUTION INTRAVENOUS; SUBCUTANEOUS at 22:52

## 2018-07-19 NOTE — CT
Date of service: 



07/19/2018



PROCEDURE:  CT Abdomen and Pelvis with contrast



HISTORY:

lower abd pain hx colon cancer



COMPARISON:

None.



TECHNIQUE:

Contrast dose: 95 mL Omnipaque 300



Radiation dose:



Total exam DLP = 821.30 mGy-cm.



This CT exam was performed using one or more of the following dose 

reduction techniques: Automated exposure control, adjustment of the 

mA and/or kV according to patient size, and/or use of iterative 

reconstruction technique.



FINDINGS:



LOWER THORAX:

Unremarkable. 



LIVER:

Irregular multifocal low-attenuation masses in right lobe of liver 

consistent with known history of hepatic metastasis from colonic 

malignancy. Compared to examination of 10/11/2015, there has been a 

great improvement in the extent of hepatic metastasis. This is 

unchanged compared to 7/11/2018. No biliary obstruction.  Smooth 

contour. 



GALLBLADDER AND BILE DUCTS:

Unremarkable. 



PANCREAS:

Unremarkable. No gross lesion or ductal dilatation.



SPLEEN:

Unremarkable. 



ADRENALS:

Unremarkable. No mass. 



KIDNEYS AND URETERS:

Unremarkable. No hydronephrosis. No solid mass. 



VASCULATURE:

Unremarkable. No aortic aneurysm. 



BOWEL:

Moderate retained feces in the right colon.  No evidence of bowel 

obstruction. No other abnormal bowel loops are appreciated. 



APPENDIX:

Normal appendix. 



PERITONEUM:

Unremarkable. No free fluid. No free air. 



LYMPH NODES:

Unremarkable. No enlarged lymph nodes. 



BLADDER:

Unremarkable. 



REPRODUCTIVE:

Normal uterus 



BONES:

No acute fracture.  Evidence of disc surgery at L4-5. 



OTHER FINDINGS:

None.



IMPRESSION:

Hepatic metastatic disease consistent with history of known colonic 

malignancy. Probable constipation. Additional findings as above.

## 2018-07-19 NOTE — ED PDOC
HPI: Abdomen


Time Seen by Provider: 07/19/18 07:05


Chief Complaint (Nursing): Abdominal Pain


Chief Complaint (Provider): lower abdominal pain


History Per: Patient


History/Exam Limitations: no limitations


Onset/Duration Of Symptoms: Days (2)


Current Symptoms Are (Timing): Still Present


Severity: Severe


Location Of Pain/Discomfort: LLQ, Periumbilical


Quality Of Discomfort: Sharp


Associated Symptoms: Nausea, Vomiting, Loss Of Appetite.  denies: Diarrhea


Exacerbating Factors: None


Alleviating Factors: None


Last Bowel Movement: Today


Additional Complaint(s): 





47yo female c/o lower abdominal pain worse overnight ongoing for several days, 

has chemoembolization of liver at [place] in early july, states pain 

intermittent since then. Associated with nausea/ dry heaving, no melena, fever 

or syncope.





Past Medical History


Reviewed: Historical Data, Nursing Documentation, Vital Signs


Vital Signs: 


 Last Vital Signs











Temp  98.2 F   07/22/18 08:11


 


Pulse  84   07/22/18 08:35


 


Resp  20   07/22/18 08:11


 


BP  116/80   07/22/18 08:35


 


Pulse Ox  96   07/22/18 08:11














- Medical History


PMH: Anxiety, Back Problems, Depression, Diabetes, Fibromyalgia, GERD, HTN, 

Malignancy


   Denies: Lynn's Disease, Alzheimer's Disease, Anemia, Arthritis, Asthma, 

Atrial Fibrillation, Benign Prostatic Hyperplasia, Bipolar Disorder, Bronchitis

, Cardia Arrhythmia, Cardiac Aneurysm, CHF, Colonic Polyps, COPD, Crohn's 

Disease, Cushing's Syndrome, Dementia, Diverticulitis, Deep Vein Thrombosis, 

Emphysema, Gastrointestinal Ulcer, Gall Bladder Disease, Graves' Disease, 

Hiatal Hernia, Hypercholesterolemia, Hyperlipidemia, Hyperthyroidism, 

Hypothyroidism, Kidney Stones, Migraine, Mitral Valve Prolapse, Multiple 

Sclerosis, Obstructive Bowel, Osteoporosis, Pancreatitis, Parkinson's Disease, 

Pericarditis, Peripheral Edema, Personality Disorder, Pneumonia, Pneumothorax, 

Post Traumatic Stress Disorder, Pulmonary Embolism, End Stage Renal Disease, 

Chronic Kidney Disease, Rheumatoid Arthritis, Schizophrenia, Seizures, Sickle 

Cell Disease, Sexually Transmitted Disease, Sleep Apnea, TIA





- Surgical History


Surgical History: Back Surgery, Endoscopy


   Denies: Appendectomy, CABG, Carotid Endarterectomy, Cholecystectomy, 

Coronary Stent, Tonsillectomy


Other surgeries: ovaries, colon





- Family History


Family History: States: Unknown Family Hx





- Living Arrangements


Living Arrangements: With Family





- Immunization History


Hx Influenza Vaccination: Yes


Hx Pneumococcal Vaccination: No





- Home Medications


Home Medications: 


 Ambulatory Orders











 Medication  Instructions  Recorded


 


amLODIPine [Norvasc] 10 mg PO DAILY #0 tab 10/12/15


 


DULoxetine [Cymbalta] 60 mg PO HS 10/30/15


 


Acetaminophen with Codeine 1 tab PO Q6 PRN 05/27/18





[Tylenol with Codeine #3 Tablet]  


 


Magnesium Hydroxide [Milk Of 30 ml PO DAILY 07/19/18





Magnesia]  


 


Omeprazole [Omeprazole] 40 mg PO DAILY 07/19/18


 


Polyethylene Glycol 3350 [Miralax] 17 gm PO BID 07/19/18


 


Tramadol HCl [Tramadol HCl ER] 200 mg PO DAILY 07/19/18














- Allergies


Allergies/Adverse Reactions: 


 Allergies











Allergy/AdvReac Type Severity Reaction Status Date / Time


 


penicillin G Allergy  RASH Verified 09/27/17 11:19


 


vancomycin Allergy  RASH Verified 09/27/17 11:19














Review of Systems


Constitutional: Positive for: Weakness.  Negative for: Fever


Cardiovascular: Negative for: Chest Pain


Respiratory: Negative for: Shortness of Breath


Gastrointestinal: Positive for: Nausea, Vomiting, Abdominal Pain.  Negative for

: Hematochezia, Hematemesis


Genitourinary Female: Negative for: Dysuria, Hematuria


Musculoskeletal: Negative for: Neck Pain


Skin: Negative for: Rash, Lesions


Neurological: Positive for: Dizziness.  Negative for: Weakness, Numbness, 

Headache


Psych: Negative for: Suicidal ideation





Physical Exam





- Reviewed


Nursing Documentation Reviewed: Yes


Vital Signs Reviewed: Yes





- Physical Exam


Appears: Positive for: Non-toxic, Uncomfortable


Head Exam: Positive for: ATRAUMATIC, NORMAL INSPECTION, NORMOCEPHALIC


Skin: Positive for: Normal Color, Warm, DRY


Eye Exam: Positive for: EOMI, Normal appearance, PERRL


ENT: Positive for: Normal ENT Inspection


Neck: Positive for: Normal, Painless ROM


Cardiovascular/Chest: Positive for: Regular Rate, Rhythm


Respiratory: Positive for: CNT, Normal Breath Sounds


Gastrointestinal/Abdominal: Positive for: Soft, Tenderness, Guarding


Back: Positive for: Normal Inspection


Extremity: Positive for: Normal ROM


Neurologic/Psych: Positive for: Alert, Oriented.  Negative for: Motor/Sensory 

Deficits





- Laboratory Results


Result Diagrams: 


 07/20/18 05:45





 07/20/18 05:45





- ECG


O2 Sat by Pulse Oximetry: 100


Pulse Ox Interpretation: Normal





Medical Decision Making


Medical Decision Making: 





workup initiated for acute on chronic pain in setting of abdominal malignancy





labs reviewed and imaging reports reviewed





remained in significant pain requiring multiple doses narcotics for pain relief





obs to FP service for pain management consult 





Disposition





- Clinical Impression


Clinical Impression: 


 Intractable abdominal pain








- Patient ED Disposition


Is Patient to be Admitted: Yes


Counseled Patient/Family Regarding: Studies Performed, Diagnosis, Need For 

Followup





- Disposition


Disposition Time: 10:30


Condition: GOOD





- Pt Status Changed To:


Hospital Disposition Of: Observation

## 2018-07-19 NOTE — CP.PCM.HP
History of Present Illness





- History of Present Illness


History of Present Illness: 


47 Y/O female with PMHx of HTN, chronic back pain, Colon cancer w/ metastasis 

to liver, Depression, Fibromyalgia, GERD presents to ED with persistent lower 

back and lower abdominal pain that exacerbated yesterday evening. Pain started 

after chemoembolization of liver done on 07/05/18. Pain is currently 10/10, 

sharp, lower abdominal, which worsens with movement and radiates to B/L lumbar 

back and B/L thigh. She takes Tylenol-3 and Tramadol for pain with incomplete 

improvement. Patient was seen in ED on 7/11/ for similar pain which improved 

with Dilaudid. Patient also reports a hx/o chronic back pain, several surgeries 

in cervical spine and lumbar spine associated with scoliosis. Patient is only 

on liquid diet(ensure and juices). She also complains of associated nausea, non 

bloody vomiting x 5, cold sweats. Pt also reports weakness, SOB on walking as 

well as with routine daily activity and hyperpigmentation of B/L hands and feet 

which started since 5FU chemotherapy. Denies any fever, chills, headache, cough

, dysuria, urinary or fecal incontinence or diarrhea. Denies eating any outside 

food or ill contacts.


[facility], has 

undergone chemotherapy with Irinotecan and Avastin. Last chemotherapy was 

single agent 5 FU. Pt has also undergone chemoembolization in 08/17. According 

to Dr. Hilton Sandoval's office patient's colon cancer is responding to 

chemotherapy and prognosis is not terrible. 





PCP: Dr. Edmundo Kaur


Heme-Oncologist: Dr. Hilton Sandoval at [facility]


Pain management: Dr. Winn


PMH: chronic back pain, fibromyalgia, HTN, GERD, colon cancer, anxiety


PSH: C6 fusion, Colon resection 2015  and b/l oopherectomy, radioembolization 

of liver.


SocialHx: denies ETOH/tobacco/drug abuse


FamilyHx: DM, HTN


Meds: as per med rec


Hospitalization: multiple including 2 ER visit in past 1 month.


Code status: full code





ED Course:


Vitals: T 98.8, P 91, BP:147/95, RR 18, o2 100


Labs: CBC WNL. CMP normal except Glucose [id number]


Imaging: CT abdomen/pelvis: 


Meds:  dilaudid 1 mg IV, Zofran 4 mg IV, IV fluids SN 1 L, Morphine 4 mg IV





Present on Admission





- Present on Admission


Any Indicators Present on Admission: Yes


History of DVT/PE: No


History of Uncontrolled Diabetes: Yes


Urinary Catheter: No


Decubitus Ulcer Present: No





Review of Systems





- Review of Systems


Systems not reviewed;Unavailable: Acuity of Condition





- Constitutional


Constitutional: Anorexia, Lethargy, Weakness.  absent: Chills, Fever, Headache





- EENT


Eyes: absent: Blurred Vision, Loss of Vision


Ears: absent: Ear Pain


Nose/Mouth/Throat: absent: Odynophagia, Sore Throat





- Cardiovascular


Cardiovascular: Dyspnea on Exertion.  absent: Chest Pain, Leg Edema, 

Palpitations





- Respiratory


Respiratory: Dyspnea on Exertion.  absent: Wheezing, Snoring, Stridor





- Gastrointestinal


Gastrointestinal: Abdominal Pain, Constipation, Nausea, Vomiting.  absent: 

Cramping, Diarrhea, Hematochezia, Loose Stools





- Genitourinary


Genitourinary: absent: Dysuria, Hematuria, Urinary Incontinence, Urinary 

Frequency, Urinary Urgency





- Musculoskeletal


Musculoskeletal: Back Pain, Limited Range of Motion, Myalgias, Radiating Pain 

into Limb, Tingling.  absent: Joint Swelling, Muscle Cramps





- Integumentary


Integumentary: Change in Pigmentation, Dry Skin.  absent: Erythema


Additional comments: 


Hyperpigmentation of B/L hands and feet








- Neurological


Neurological: absent: Dizziness, Focal Weakness, Headaches, Loss of Vision, 

Sensory Deficit, Tremor





- Psychiatric


Psychiatric: Change in Appetite





- Endocrine


Endocrine: absent: Palpitations





Past Patient History





- Infectious Disease


Hx of Infectious Diseases: None





- Tetanus Immunizations


Tetanus Immunization: Unknown





- Past Medical History & Family History


Past Medical History?: Yes





- Past Social History


Smoking Status: Never Smoked


Chewing Tobacco Use: No


Cigar Use: No


Alcohol: Social


Drugs: Denies





- CARDIAC


Hx Cardiac Disorders: Yes





- PULMONARY


Hx Respiratory Disorders: No





- NEUROLOGICAL


Hx Neurological Disorder: No





- HEENT


Hx HEENT Problems: No





- RENAL


Hx Chronic Kidney Disease: No





- ENDOCRINE/METABOLIC


Hx Endocrine Disorders: Yes





- HEMATOLOGICAL/ONCOLOGICAL


Hx Blood Disorders: No





- INTEGUMENTARY


Hx Dermatological Problems: No





- MUSCULOSKELETAL/RHEUMATOLOGICAL


Hx Musculoskeletal Disorders: Yes





- GASTROINTESTINAL


Hx Crohn's Disease: No


Hx Diverticulitis: No


Hx Gall Bladder Disease: No


Hx Pancreatitis: No





- GENITOURINARY/GYNECOLOGICAL


Hx Genitourinary Disorders: No





- PSYCHIATRIC


Hx Psychophysiologic Disorder: Yes





- SURGICAL HISTORY


Hx Appendectomy: No


Hx Carotid Endarterectomy: No


Hx Cholecystectomy: No


Hx Coronary Artery Bypass Graft: No


Hx Coronary Stent: No


Hx Tonsillectomy: No





- ANESTHESIA


Hx Anesthesia: Yes


Hx Anesthesia Reactions: No


Hx Malignant Hyperthermia: No





Meds


Allergies/Adverse Reactions: 


 Allergies











Allergy/AdvReac Type Severity Reaction Status Date / Time


 


penicillin G Allergy  RASH Verified 09/27/17 11:19


 


vancomycin Allergy  RASH Verified 09/27/17 11:19














Physical Exam





- Constitutional


Appears: Well, Non-toxic, In Acute Distress





- Head Exam


Head Exam: ATRAUMATIC, NORMAL INSPECTION, NORMOCEPHALIC





- Eye Exam


Eye Exam: EOMI, Normal appearance, PERRL


Pupil Exam: NORMAL ACCOMODATION, PERRL





- ENT Exam


ENT Exam: Mucous Membranes Moist





- Neck Exam


Neck exam: Positive for: Full Rom





- Respiratory Exam


Respiratory Exam: Clear to Auscultation Bilateral, NORMAL BREATHING PATTERN.  

absent: Rales, Rhonchi, Wheezes, Respiratory Distress





- Cardiovascular Exam


Cardiovascular Exam: REGULAR RHYTHM, +S1, +S2.  absent: Systolic Murmur





- GI/Abdominal Exam


GI & Abdominal Exam: Diminished Bowel Sounds, Guarding, Soft, Tenderness.  

absent: Distended, Rebound





- Extremities Exam


Extremities exam: Positive for: pedal pulses present.  Negative for: pedal edema

, tenderness


Additional comments: 


Pulse +2 B/L radial, brachial, dorsalis pedis








- Back Exam


Back exam: muscle spasm, paraspinal tenderness.  absent: CVA tenderness (L), 

CVA tenderness (R)





- Neurological Exam


Neurological exam: Alert, Oriented x3





- Psychiatric Exam


Psychiatric exam: Normal Affect, Normal Mood





- Skin


Skin Exam: Dry, Intact


Additional comments: 


Hyperpigmented skin B/L hands and feet, Pealing skin of UE fingers, cold to 

tough. No erythema, pallor or rash


3 x Abdominal scars secondary to surgery








Results





- Vital Signs


Recent Vital Signs: 





 Last Vital Signs











Temp  98.7 F   07/19/18 10:21


 


Pulse  79   07/19/18 10:21


 


Resp  17   07/19/18 10:21


 


BP  142/86   07/19/18 10:21


 


Pulse Ox  95   07/19/18 09:49














- Labs


Result Diagrams: 


 07/19/18 07:20





 07/19/18 07:20


Labs: 





 Laboratory Results - last 24 hr











  07/19/18 07/19/18 07/19/18





  07:20 07:20 10:14


 


WBC  6.4  


 


RBC  4.79  


 


Hgb  14.2  


 


Hct  41.2  


 


MCV  85.9  


 


MCH  29.6  


 


MCHC  34.4  


 


RDW  14.4  


 


Plt Count  340  D  


 


MPV  9.1  


 


Neut % (Auto)  74.6  


 


Lymph % (Auto)  14.1 L  


 


Mono % (Auto)  9.8  


 


Eos % (Auto)  1.1  


 


Baso % (Auto)  0.4  


 


Neut # (Auto)  4.8  


 


Lymph # (Auto)  0.9 L  


 


Mono # (Auto)  0.6  


 


Eos # (Auto)  0.1  


 


Baso # (Auto)  0.0  


 


Sodium   138 


 


Potassium   4.8 


 


Chloride   95 L 


 


Carbon Dioxide   26 


 


Anion Gap   22 H 


 


BUN   6 L 


 


Creatinine   0.5 L 


 


Est GFR ( Amer)   > 60 


 


Est GFR (Non-Af Amer)   > 60 


 


Random Glucose   247 H 


 


Calcium   10.6 H 


 


Total Bilirubin   0.8 


 


AST   58 H D 


 


ALT   36 


 


Alkaline Phosphatase   183 H D 


 


Total Protein   9.4 H 


 


Albumin   4.7 


 


Globulin   4.7 H 


 


Albumin/Globulin Ratio   1.0 


 


Lipase   52 


 


Urine Color    Yellow


 


Urine Clarity    Slighty-cloudy


 


Urine pH    8.0


 


Ur Specific Gravity    1.036 H


 


Urine Protein    Negative


 


Urine Glucose (UA)    150


 


Urine Ketones    Trace


 


Urine Blood    Negative


 


Urine Nitrate    Negative


 


Urine Bilirubin    Negative


 


Urine Urobilinogen    0.2-1.0


 


Ur Leukocyte Esterase    Neg


 


Urine RBC (Auto)    < 1


 


Urine Microscopic WBC    2


 


Ur Squamous Epith Cells    6 H


 


Urine Bacteria    Rare














- Imaging and Cardiology


  ** CT scan - abdomen


Status: Report reviewed by me


Additional comment: 


Hepatic metastatic disease from colon cancer. Probable constipation.








Assessment & Plan





- Assessment and Plan (Free Text)


Assessment: 


47 Y/O female with PMHx of HTN, chronic back pain, Colon cancer w/ metastasis 

to liver, Depression, Fibromyalgia, GERD presents to ED with persistent lower 

back and lower abdominal pain.





Plan: 


Lower abdominal pain


- CT abdomen/Pelvis: Hepatic metastasic malignancy consistent with colonic 

malignancy. Probable Constipation.(See full report)


- CBC, CMP wnl except Glucose: 247, AST 58, , Total protein 9.4, Calcium 

10.6.


- UA unremarkable


- Dilaudid 1.5 mg IVP Q4hr


- F/U CBC, CMP


- Pain management consulted: Dr. Winn





Nausea and vomiting


- Improving


- Zofran inj 4 mg Q6hr PRN





Chronic back pain


- Chronic


- H/O multiple lumbar surgeries. 


- Dilaudid 1.5 mg IVP Q4hr for pain.


- F/U CBC, CMP





NIDDM


- Uncontrolled


- Last A1c 7.7


- Glucose 247 in ED


- Pt denies taking meds for DM


- Insulin lispro scale and Hypoglycemia protocol  


- Accucheck AM





Colon cancer w/metastasis to liver


- CT abdomen/pelvis consistent with liver metastasis, unchanged from previous 

CT on 7/11


- Received 5 FU chemo at [place].


- Pt to F/U with Dr. Sandoval on July 30





HTN


- Uncontrolled


- Amlodipine 10 mg QD


- Will monitor vitals Q8hr





Fibromyalgia


- Controlled


- Duloxetine 60 mg PO HS





DVT Prophylaxis


- Lovenox 40 mg SC daily

## 2018-07-20 LAB
ALBUMIN SERPL-MCNC: 4.8 G/DL (ref 3.5–5)
ALBUMIN/GLOB SERPL: 1 {RATIO} (ref 1–2.1)
ALT SERPL-CCNC: 31 U/L (ref 9–52)
AST SERPL-CCNC: 55 U/L (ref 14–36)
BASOPHILS # BLD AUTO: 0 K/UL (ref 0–0.2)
BASOPHILS NFR BLD: 0.2 % (ref 0–2)
BUN SERPL-MCNC: 4 MG/DL (ref 7–17)
CALCIUM SERPL-MCNC: 10.6 MG/DL (ref 8.4–10.2)
EOSINOPHIL # BLD AUTO: 0 K/UL (ref 0–0.7)
EOSINOPHIL NFR BLD: 0.4 % (ref 0–4)
ERYTHROCYTE [DISTWIDTH] IN BLOOD BY AUTOMATED COUNT: 14 % (ref 11.5–14.5)
GFR NON-AFRICAN AMERICAN: > 60
GIANT PLATELETS BLD QL SMEAR: PRESENT
HGB BLD-MCNC: 14.1 G/DL (ref 12–16)
LG PLATELETS BLD QL SMEAR: PRESENT
LYMPHOCYTE: 8 % (ref 20–50)
LYMPHOCYTES # BLD AUTO: 0.7 K/UL (ref 1–4.3)
LYMPHOCYTES NFR BLD AUTO: 9.8 % (ref 20–40)
MCH RBC QN AUTO: 29.5 PG (ref 27–31)
MCHC RBC AUTO-ENTMCNC: 33.7 G/DL (ref 33–37)
MCV RBC AUTO: 87.5 FL (ref 81–99)
MONOCYTE: 12 % (ref 0–10)
MONOCYTES # BLD: 0.7 K/UL (ref 0–0.8)
MONOCYTES NFR BLD: 9.8 % (ref 0–10)
NEUTROPHILS # BLD: 6 K/UL (ref 1.8–7)
NEUTROPHILS NFR BLD AUTO: 79 % (ref 42–75)
NEUTROPHILS NFR BLD AUTO: 79.8 % (ref 50–75)
NEUTS BAND NFR BLD: 1 % (ref 0–2)
NRBC BLD AUTO-RTO: 0 % (ref 0–0)
PLATELET # BLD EST: NORMAL 10*3/UL
PLATELET # BLD: 342 K/UL (ref 130–400)
PMV BLD AUTO: 8.6 FL (ref 7.2–11.7)
RBC # BLD AUTO: 4.78 MIL/UL (ref 3.8–5.2)
RBC MORPH BLD: NORMAL
TOTAL CELLS COUNTED BLD: 100
WBC # BLD AUTO: 7.5 K/UL (ref 4.8–10.8)

## 2018-07-20 RX ADMIN — STANDARDIZED SENNA CONCENTRATE AND DOCUSATE SODIUM SCH TAB: 8.6; 5 TABLET, FILM COATED ORAL at 21:24

## 2018-07-20 RX ADMIN — POLYETHYLENE GLYCOL 3350 SCH GM: 17 POWDER, FOR SOLUTION ORAL at 12:09

## 2018-07-20 RX ADMIN — INSULIN LISPRO SCH: 100 INJECTION, SOLUTION INTRAVENOUS; SUBCUTANEOUS at 07:30

## 2018-07-20 RX ADMIN — INSULIN LISPRO SCH: 100 INJECTION, SOLUTION INTRAVENOUS; SUBCUTANEOUS at 11:30

## 2018-07-20 RX ADMIN — ENOXAPARIN SODIUM SCH MG: 40 INJECTION SUBCUTANEOUS at 12:09

## 2018-07-20 RX ADMIN — INSULIN LISPRO SCH: 100 INJECTION, SOLUTION INTRAVENOUS; SUBCUTANEOUS at 16:30

## 2018-07-20 RX ADMIN — INSULIN LISPRO SCH: 100 INJECTION, SOLUTION INTRAVENOUS; SUBCUTANEOUS at 22:17

## 2018-07-20 NOTE — CP.PCM.PN
Subjective





- Date & Time of Evaluation


Date of Evaluation: 07/20/18


Time of Evaluation: 08:30





- Subjective


Subjective: 


Patient is well known to me from pain clinic.  She's suffering from intractable 

lower abdominal, lower back and hip pain after the latest chemoembolization 

procedure for her known liver mets.  Home medication of Tramadol and Tylenol #3 

are no longer helping with her pain.  





There are no known bony mets according to the patient.  I'm not able to find 

her recent PET scans.  She's had chronic lower back pain, after remote lumbar 

surgery and has needed occasional injections in the past, her last being in 

late May of this year.  





On exam, pain is more located in the lower back, and this radiates into the 

bilateral groin regions, as well as the hip.  





Objective





- Vital Signs/Intake and Output


Vital Signs (last 24 hours): 


 











Temp Pulse Resp BP Pulse Ox


 


 97.7 F   79   19   151/83 H  98 


 


 07/20/18 07:51  07/20/18 07:51  07/20/18 07:51  07/20/18 07:51  07/20/18 07:51











- Medications


Medications: 


 Current Medications





Acetaminophen/Codeine Phosphate (Tylenol/Codeine 300 Mg/30 Mg)  1 tab PO Q6 PRN


   PRN Reason: Pain, moderate (4-7)


Amlodipine Besylate (Norvasc)  10 mg PO DAILY [facility]


Dextrose (Dextrose 50% Inj)  0 ml IV STAT PRN; Protocol


   PRN Reason: Hypoglycemia Protocol


Dextrose (Glutose 15)  0 gm PO ONCE PRN; Protocol


   PRN Reason: Hypoglycemia Protocol


Duloxetine HCl (Cymbalta)  60 mg PO HS [facility]


   Last Admin: 07/19/18 21:22 Dose:  60 mg


Enoxaparin Sodium (Lovenox)  40 mg SC DAILY [facility]


   PRN Reason: Protocol


Famotidine (Pepcid)  20 mg PO BID [facility]


   Last Admin: 07/19/18 17:24 Dose:  20 mg


Glucagon (Glucagen Diagnostic Kit)  0 mg IM STAT PRN; Protocol


   PRN Reason: Hypoglycemia Protocol


Hydromorphone HCl (Dilaudid)  1.5 mg IVP Q4 PRN


   PRN Reason: Pain, severe (8-10)


   Last Admin: 07/20/18 06:50 Dose:  1.5 mg


Insulin Human Lispro (Humalog)  0 units SC ACHS [facility]


   PRN Reason: Protocol


   Last Admin: 07/19/18 22:52 Dose:  Not Given


Ondansetron HCl (Zofran Inj)  4 mg IVP Q6 PRN


   PRN Reason: Nausea/Vomiting


   Last Admin: 07/20/18 03:09 Dose:  4 mg


Polyethylene Glycol (Miralax)  17 gm PO DAILY [facility]


Senna/Docusate Sodium (Senokot S 50 Mg-8.6 Mg)  2 tab PO HS [facility]


   Last Admin: 07/19/18 21:22 Dose:  2 tab


Tramadol HCl (Ultram)  50 mg PO Q6 [facility]











- Labs


Labs: 


 





 07/20/18 05:45 





 07/20/18 05:45 











Assessment and Plan


(1) Back pain


Assessment & Plan: 


47 yo woman w/ metastatic colon cancer to the liver, also with acute on chronic 

lower back pain.  





- for injection today


- consider hip MRI's if pain doesn't improve with injections today


- continue Dilaudid 1mg IV PRN for now while hospitalized


- can discharge patient on Vicodin, and Tramadol


- d/c T#3


Status: Acute

## 2018-07-20 NOTE — CP.PCM.PN
Subjective





- Date & Time of Evaluation


Date of Evaluation: 07/20/18


Time of Evaluation: 07:35





- Subjective


Subjective: 


Pt evaluated and examined at bedside in the morning. NAD, AAO x 3. Improvement 

in abdominal pain and nausea with medications but back pain/hip still persists. 

Denies any CP, SOB, Diarrhea, constipation or dysuria. Tolerating Ensure and 

juices PO.








Objective





- Vital Signs/Intake and Output


Vital Signs (last 24 hours): 


 











Temp Pulse Resp BP Pulse Ox


 


 99.3 F   100 H  18   148/100 H  100 


 


 07/20/18 11:40  07/20/18 11:40  07/20/18 11:40  07/20/18 12:06  07/20/18 11:40








Intake and Output: 


 











 07/20/18 07/20/18





 06:59 18:59


 


Intake Total  75


 


Balance  75














- Medications


Medications: 


 Current Medications





Acetaminophen/Codeine Phosphate (Tylenol/Codeine 300 Mg/30 Mg)  1 tab PO Q6 PRN


   PRN Reason: Pain, moderate (4-7)


Amlodipine Besylate (Norvasc)  10 mg PO DAILY Counts include 234 beds at the [facility]


   Last Admin: 07/20/18 12:06 Dose:  10 mg


Dextrose (Dextrose 50% Inj)  0 ml IV STAT PRN; Protocol


   PRN Reason: Hypoglycemia Protocol


Dextrose (Glutose 15)  0 gm PO ONCE PRN; Protocol


   PRN Reason: Hypoglycemia Protocol


Duloxetine HCl (Cymbalta)  60 mg PO HS Counts include 234 beds at the [facility]


   Last Admin: 07/19/18 21:22 Dose:  60 mg


Enoxaparin Sodium (Lovenox)  40 mg SC DAILY [facility]


   PRN Reason: Protocol


   Last Admin: 07/20/18 12:09 Dose:  40 mg


Famotidine (Pepcid)  20 mg PO BID Counts include 234 beds at the [facility]


   Last Admin: 07/20/18 12:07 Dose:  20 mg


Glipizide (Glucotrol)  2.5 mg PO ACB Counts include 234 beds at the [facility]


   Last Admin: 07/20/18 12:11 Dose:  2.5 mg


Glucagon (Glucagen Diagnostic Kit)  0 mg IM STAT PRN; Protocol


   PRN Reason: Hypoglycemia Protocol


Hydromorphone HCl (Dilaudid)  1.5 mg IVP Q4 PRN


   PRN Reason: Pain, severe (8-10)


   Last Admin: 07/20/18 12:05 Dose:  1.5 mg


Lactated Ringer's (Lactated Ringer's)  1,000 mls @ 100 mls/hr IV .Q10H Counts include 234 beds at the [facility]


Insulin Human Lispro (Humalog)  0 units SC ACHS [facility]


   PRN Reason: Protocol


   Last Admin: 07/20/18 11:30 Dose:  Not Given


Magnesium Hydroxide (Milk Of Magnesia)  30 ml PO DAILY Counts include 234 beds at the [facility]


   Last Admin: 07/20/18 12:18 Dose:  Not Given


Ondansetron HCl (Zofran Inj)  4 mg IVP Q6 PRN


   PRN Reason: Nausea/Vomiting


   Last Admin: 07/20/18 03:09 Dose:  4 mg


Polyethylene Glycol (Miralax)  17 gm PO DAILY Counts include 234 beds at the [facility]


   Last Admin: 07/20/18 12:09 Dose:  17 gm


Senna/Docusate Sodium (Senokot S 50 Mg-8.6 Mg)  2 tab PO HS Counts include 234 beds at the [facility]


   Last Admin: 07/19/18 21:22 Dose:  2 tab


Tramadol HCl (Ultram)  50 mg PO Q6 Counts include 234 beds at the [facility]


   Last Admin: 07/20/18 10:00 Dose:  Not Given











- Labs


Labs: 


 





 07/20/18 05:45 





 07/20/18 05:45 











- Constitutional


Appears: Well, Non-toxic, No Acute Distress





- Head Exam


Head Exam: ATRAUMATIC, NORMAL INSPECTION, NORMOCEPHALIC





- Eye Exam


Eye Exam: EOMI, Normal appearance


Pupil Exam: NORMAL ACCOMODATION, PERRL





- ENT Exam


ENT Exam: Mucous Membranes Moist





- Neck Exam


Neck Exam: Full ROM





- Respiratory Exam


Respiratory Exam: Clear to Ausculation Bilateral, NORMAL BREATHING PATTERN.  

absent: Rales, Rhonchi, Wheezes, Respiratory Distress





- Cardiovascular Exam


Cardiovascular Exam: REGULAR RHYTHM, +S1, +S2.  absent: Murmur





- GI/Abdominal Exam


GI & Abdominal Exam: Soft, Normal Bowel Sounds.  absent: Distended, Guarding, 

Rigid, Tenderness





- Extremities Exam


Extremities Exam: absent: Calf Tenderness, Tenderness





- Back Exam


Back Exam: tenderness


Additional comments: 


B/L hip tenderness


Straight leg raise + B/L











- Neurological Exam


Neurological Exam: Alert, Awake, Oriented x3





- Psychiatric Exam


Psychiatric exam: Normal Affect, Normal Mood.  absent: Anxious, Depressed





- Skin


Skin Exam: Dry, Intact, Normal Color





Assessment and Plan





- Assessment and Plan (Free Text)


Assessment: 


47 Y/O female with PMHx of HTN, chronic back pain, Colon cancer w/ metastasis 

to liver, Depression, Fibromyalgia, GERD presents to ED with persistent lower 

back and lower abdominal pain.


Plan: 


 Lower abdominal pain


- Improving


- CT abdomen/Pelvis: Hepatic metastasic malignancy consistent with colonic 

malignancy. Probable Constipation.(See full report)


- CBC, CMP wnl except Glucose: 177, AST 55, . Improvement in LFTs and 

glucose.


- UA unremarkable


- Dilaudid 1.5 mg IVP Q4hr


- F/U CBC, CMP





Chronic back pain


- Chronic, H/O multiple lumbar surgeries. 


- Dilaudid 1.5 mg IVP Q4hr with mild improvement in pain


- As per PM team, theraputic injection in B/L hip


- If no improvement in pain, consider MRI 





Nausea and vomiting


- Improved


- Zofran inj 4 mg Q6hr PRN





NIDDM


- Uncontrolled


- Last A1c 7.7


- Glucose 216


- Pt denies taking home meds for DM


- Insulin lispro scale and Hypoglycemia protocol  


- Accucheck AM


- F/U as an outpatient basis.





Colon cancer w/metastasis to liver


- CT abdomen/pelvis consistent with liver metastasis, unchanged from previous 

CT on 7/11


- Received 5 FU chemo at [facility].


- Pt to F/U with Dr. Sandoval on July 30





HTN


- Uncontrolled


- Amlodipine 10 mg QD


- Will monitor vitals Q8hr





Fibromyalgia


- Controlled


- Duloxetine 60 mg PO HS





DVT Prophylaxis


- Lovenox 40 mg SC daily

## 2018-07-21 RX ADMIN — INSULIN LISPRO SCH: 100 INJECTION, SOLUTION INTRAVENOUS; SUBCUTANEOUS at 07:30

## 2018-07-21 RX ADMIN — INSULIN LISPRO SCH: 100 INJECTION, SOLUTION INTRAVENOUS; SUBCUTANEOUS at 12:30

## 2018-07-21 RX ADMIN — INSULIN LISPRO SCH UNITS: 100 INJECTION, SOLUTION INTRAVENOUS; SUBCUTANEOUS at 17:34

## 2018-07-21 RX ADMIN — LACTULOSE SCH GM: 10 SOLUTION ORAL; RECTAL at 19:56

## 2018-07-21 RX ADMIN — INSULIN LISPRO SCH: 100 INJECTION, SOLUTION INTRAVENOUS; SUBCUTANEOUS at 22:23

## 2018-07-21 RX ADMIN — STANDARDIZED SENNA CONCENTRATE AND DOCUSATE SODIUM SCH: 8.6; 5 TABLET, FILM COATED ORAL at 22:32

## 2018-07-21 RX ADMIN — ENOXAPARIN SODIUM SCH MG: 40 INJECTION SUBCUTANEOUS at 09:40

## 2018-07-21 RX ADMIN — POLYETHYLENE GLYCOL 3350 SCH GM: 17 POWDER, FOR SOLUTION ORAL at 09:41

## 2018-07-21 RX ADMIN — LACTULOSE SCH: 10 SOLUTION ORAL; RECTAL at 22:32

## 2018-07-21 NOTE — CP.PCM.PN
Subjective





- Date & Time of Evaluation


Date of Evaluation: 07/21/18


Time of Evaluation: 08:00





- Subjective


Subjective: 


Patient evaluated and examined bedside. No acute distress. Back pain improving 

after B/L hip injections. Still no BM. Denies any headaches, fatigue, nausea, 

vomiting, diarrhea, urinary complains or weakness. 








Objective





- Vital Signs/Intake and Output


Vital Signs (last 24 hours): 


 











Temp Pulse Resp BP Pulse Ox


 


 98.2 F   91 H  19   124/86   98 


 


 07/21/18 08:21  07/21/18 09:46  07/21/18 08:21  07/21/18 09:46  07/21/18 08:21











- Medications


Medications: 


 Current Medications





Acetaminophen/Codeine Phosphate (Tylenol/Codeine 300 Mg/30 Mg)  1 tab PO Q6 PRN


   PRN Reason: Pain, moderate (4-7)


Amlodipine Besylate (Norvasc)  10 mg PO DAILY [facility]


   Last Admin: 07/21/18 09:46 Dose:  10 mg


Bisacodyl (Dulcolax)  5 mg PO DAILY PRN


   PRN Reason: Constipation


Bisacodyl (Dulcolax)  10 mg [place] DAILY PRN


   PRN Reason: Constipation


Dextrose (Dextrose 50% Inj)  0 ml IV STAT PRN; Protocol


   PRN Reason: Hypoglycemia Protocol


Dextrose (Glutose 15)  0 gm PO ONCE PRN; Protocol


   PRN Reason: Hypoglycemia Protocol


Duloxetine HCl (Cymbalta)  60 mg PO HS [facility]


   Last Admin: 07/20/18 21:23 Dose:  60 mg


Enoxaparin Sodium (Lovenox)  40 mg SC DAILY [facility]


   PRN Reason: Protocol


   Last Admin: 07/21/18 09:40 Dose:  40 mg


Famotidine (Pepcid)  20 mg PO BID [facility]


   Last Admin: 07/21/18 09:47 Dose:  20 mg


Glipizide (Glucotrol)  2.5 mg PO ACB [facility]


   Last Admin: 07/21/18 07:35 Dose:  2.5 mg


Glucagon (Glucagen Diagnostic Kit)  0 mg IM STAT PRN; Protocol


   PRN Reason: Hypoglycemia Protocol


Hydromorphone HCl (Dilaudid)  1.5 mg IVP Q4 PRN


   PRN Reason: Pain, severe (8-10)


   Last Admin: 07/21/18 05:22 Dose:  1.5 mg


Lactated Ringer's (Lactated Ringer's)  1,000 mls @ 100 mls/hr IV .Q10H [facility]


   Last Admin: 07/21/18 06:45 Dose:  Not Given


Insulin Human Lispro (Humalog)  0 units SC ACHS [facility]


   PRN Reason: Protocol


   Last Admin: 07/20/18 22:17 Dose:  Not Given


Magnesium Hydroxide (Milk Of Magnesia)  30 ml PO DAILY [facility]


Ondansetron HCl (Zofran Inj)  4 mg IVP Q6 PRN


   PRN Reason: Nausea/Vomiting


   Last Admin: 07/20/18 16:40 Dose:  4 mg


Polyethylene Glycol (Miralax)  17 gm PO DAILY [facility]


   Last Admin: 07/21/18 09:41 Dose:  17 gm


Senna/Docusate Sodium (Senokot S 50 Mg-8.6 Mg)  2 tab PO HS [facility]


   Last Admin: 07/20/18 21:24 Dose:  2 tab


Tramadol HCl (Ultram)  50 mg PO Q6 [facility]


   Last Admin: 07/21/18 09:49 Dose:  50 mg











- Labs


Labs: 


 





 07/20/18 05:45 





 07/20/18 05:45 











- Constitutional


Appears: Well, Non-toxic, No Acute Distress





- Head Exam


Head Exam: ATRAUMATIC, NORMAL INSPECTION, NORMOCEPHALIC





- Eye Exam


Eye Exam: EOMI, Normal appearance, PERRL


Pupil Exam: NORMAL ACCOMODATION, PERRL





- ENT Exam


ENT Exam: Mucous Membranes Moist





- Respiratory Exam


Respiratory Exam: Clear to Ausculation Bilateral, NORMAL BREATHING PATTERN





- Cardiovascular Exam


Cardiovascular Exam: REGULAR RHYTHM, +S1, +S2





- GI/Abdominal Exam


GI & Abdominal Exam: Soft, Hypoactive Bowel Sounds.  absent: Tenderness, Rebound





- Extremities Exam


Extremities Exam: absent: Tenderness





- Back Exam


Back Exam: tenderness.  absent: CVA tenderness (L), CVA tenderness (R)





- Neurological Exam


Neurological Exam: Alert, Awake, Oriented x3





- Psychiatric Exam


Psychiatric exam: Normal Affect, Normal Mood





- Skin


Skin Exam: Dry, Intact, Normal Color, Warm





Assessment and Plan





- Assessment and Plan (Free Text)


Assessment: 


 49 Y/O female with PMHx of HTN, chronic back pain, Colon cancer w/ metastasis 

to liver, Depression, Fibromyalgia, GERD presents to ED with persistent lower 

back and lower abdominal pain.


Plan: 


 Low back pain


- Chronic, H/O multiple lumbar surgeries. 


- Improving


- B/L hip injections of Marcaine, Lidocaine with improvement.


- Dilaudid 1.5 mg IVP Q4hr


- Pain management on Board.


- Out patient PM F/U, Oral Vicodin and Tramadol upon discharge as per PM.





Constipation


- Continue diet PO


- No BM yet


- Milk of magnesium and Lactulose suppository for BM.





Lower abdominal pain


- Resolved.


- CT abdomen/Pelvis: Hepatic metastasic malignancy consistent with colonic 

malignancy. Probable Constipation.(See full report)


- CBC, CMP wnl except Glucose: 177, AST 55, . Improvement in LFTs and 

glucose.


- UA unremarkable


- Dilaudid 1.5 mg IVP Q4hr





Nausea and vomiting


- Improved


- Zofran inj 4 mg Q6hr PRN





NIDDM


- Uncontrolled


- Last A1c 7.7


- Glucose 216


- Pt denies taking home meds for DM


- Insulin lispro scale and Hypoglycemia protocol  


- Accucheck AM


- F/U as an outpatient basis.





Colon cancer w/metastasis to liver


- CT abdomen/pelvis consistent with liver metastasis, unchanged from previous 

CT on 7/11


- Received 5 FU chemo at [place].


- Pt to F/U with Dr. Sandoval on July 30





HTN


- Uncontrolled


- Amlodipine 10 mg QD


- Will monitor vitals Q8hr





Fibromyalgia


- Controlled


- Duloxetine 60 mg PO HS





DVT Prophylaxis


- Lovenox 40 mg SC daily

## 2018-07-21 NOTE — OP
PROCEDURE DATE:  07/20/2018



PREOPERATIVE DIAGNOSIS:  Bilateral hip osteoarthritis.



POSTOPERATIVE DIAGNOSIS:  Bilateral hip osteoarthritis.



PROCEDURE:  Bilateral sacroiliac joint steroid injection and bilateral

greater trochanteric bursa injection.



SURGEON:  Sonido Winn MD



ANESTHESIOLOGIST:  Thanh Mendoza MD



TYPE OF ANESTHESIA:  Monitored anesthesia care.



COMPLICATIONS:  None.



SPECIMENS:  None.



DESCRIPTION OF PROCEDURE:  As follows.



After we had discussion of the procedure with the patient including its

risks, benefits, alternatives, outcome data, possibility of no effect or

increased pain, the patient consented to the procedure.  She denied any

recent infection, bleeding tendencies or being on anticoagulants; a

decision was then made to proceed to the OR.



The patient was placed on a fluoroscopy table in a prone position with two

pillows underneath her abdomen.  The back was prepped and draped in the

usual sterile fashion, and sterile technique was adhered to during the

entire procedure.  The sacroiliac joint was first visualized on the right

side in the anteroposterior view.  The target is at the inferior border of

the posterior opening to the sacroiliac joint.  The skin overlying the

target area was then infiltrated with 1% lidocaine using 25-gauge needle. 

Subsequently, a 22-gauge 3.5-inch spinal needle was then incrementally

advanced under fluoroscopic guidance until the tip of needle walked into

the joint capsule.  This was confirmed by injecting approximately 0.5 mL of

Isovue contrast, which was seen spread up the sacroiliac joint.  After

appropriate placement of the needle, approximately 3 mL of 0.25% Marcaine

and Depo-Medrol mixture were injected.  The needle was then removed and

same exact procedure was performed on the contralateral left side using the

same medications and techniques.



Then, greater trochanter on the right side was visualized on the

anteroposterior view.  The skin directly over the greater trochanter was

marked, and a 25-gauge 3.5-inch spinal needle was then inserted

perpendicular to the skin until bony contact was made with the greater

trochanter on the right side.  This one was confirmed on the fluoroscopy

view.  After appropriate placement of the needle, approximately 5 mL of

0.25% Marcaine and Depo-Medrol mixture were injected.  The needle was then

removed and same exact procedure was performed on the contralateral left

side using the same medications and techniques.  At the end of the case,

the patient's back was cleaned, and dry bandages were applied.



The patient was then transferred to the recovery area in good condition

without any signs of CNS toxicity or any neurological deficit.  She will be

following up in the office in approximately two to four weeks.





__________________________________________

En-Berhane Winn MD





DD:  07/20/2018 13:28:08

DT:  07/20/2018 19:31:44

Job # [id number]

## 2018-07-21 NOTE — RAD
Date of service: 



07/20/2018



PROCEDURE:  Intraoperative fluoroscopy



HISTORY:

PAIN MANAGEMENT



COMPARISON:

Not available



TECHNIQUE:

Intraoperative fluoroscopy was provided for interventional pain 

management procedure.  Total time of fluoroscopy was 49.6 seconds. 

Total radiation dose was 13.68 mGy.



FINDINGS:

Multiple fluoroscopic spot films are submitted.



IMPRESSION:

Fluoroscopy provided.

## 2018-07-22 VITALS — DIASTOLIC BLOOD PRESSURE: 80 MMHG | RESPIRATION RATE: 20 BRPM | SYSTOLIC BLOOD PRESSURE: 116 MMHG | HEART RATE: 84 BPM

## 2018-07-22 VITALS — OXYGEN SATURATION: 100 %

## 2018-07-22 VITALS — TEMPERATURE: 98.2 F

## 2018-07-22 RX ADMIN — LACTULOSE SCH: 10 SOLUTION ORAL; RECTAL at 04:36

## 2018-07-22 RX ADMIN — POLYETHYLENE GLYCOL 3350 SCH: 17 POWDER, FOR SOLUTION ORAL at 08:43

## 2018-07-22 RX ADMIN — LACTULOSE SCH: 10 SOLUTION ORAL; RECTAL at 11:18

## 2018-07-22 RX ADMIN — INSULIN LISPRO SCH UNITS: 100 INJECTION, SOLUTION INTRAVENOUS; SUBCUTANEOUS at 08:34

## 2018-07-22 RX ADMIN — MAGNESIUM HYDROXIDE SCH: 400 SUSPENSION ORAL at 08:42

## 2018-07-22 RX ADMIN — ENOXAPARIN SODIUM SCH MG: 40 INJECTION SUBCUTANEOUS at 08:34

## 2018-07-22 RX ADMIN — POLYETHYLENE GLYCOL 3350 SCH GM: 17 POWDER, FOR SOLUTION ORAL at 08:35

## 2018-07-22 RX ADMIN — MAGNESIUM HYDROXIDE SCH ML: 400 SUSPENSION ORAL at 08:38

## 2018-07-22 NOTE — CP.PCM.DIS
Provider





- Provider


Date of Admission: 


07/19/18 10:07





Attending physician: 


Dayana Langley MD





Time Spent in preparation of Discharge (in minutes): 15





Diagnosis





- Discharge Diagnosis


(1) Abdominal pain


Status: Acute   





(2) Back pain


Status: Acute   





(3) Colon cancer metastasized to liver


Status: Chronic   





Hospital Course





- Lab Results


Lab Results: 


 Most Recent Lab Values











WBC  7.5 K/uL (4.8-10.8)   07/20/18  05:45    


 


RBC  4.78 Mil/uL (3.80-5.20)   07/20/18  05:45    


 


Hgb  14.1 g/dL (12.0-16.0)   07/20/18  05:45    


 


Hct  41.9 % (34.0-47.0)   07/20/18  05:45    


 


MCV  87.5 fl (81.0-99.0)   07/20/18  05:45    


 


MCH  29.5 pg (27.0-31.0)   07/20/18  05:45    


 


MCHC  33.7 g/dL (33.0-37.0)   07/20/18  05:45    


 


RDW  14.0 % (11.5-14.5)   07/20/18  05:45    


 


Plt Count  342 K/uL (130-400)   07/20/18  05:45    


 


MPV  8.6 fl (7.2-11.7)   07/20/18  05:45    


 


Neut % (Auto)  79.8 % (50.0-75.0)  H  07/20/18  05:45    


 


Lymph % (Auto)  9.8 % (20.0-40.0)  L  07/20/18  05:45    


 


Mono % (Auto)  9.8 % (0.0-10.0)   07/20/18  05:45    


 


Eos % (Auto)  0.4 % (0.0-4.0)   07/20/18  05:45    


 


Baso % (Auto)  0.2 % (0.0-2.0)   07/20/18  05:45    


 


Neut # (Auto)  6.0 K/uL (1.8-7.0)   07/20/18  05:45    


 


Lymph # (Auto)  0.7 K/uL (1.0-4.3)  L  07/20/18  05:45    


 


Mono # (Auto)  0.7 K/uL (0.0-0.8)   07/20/18  05:45    


 


Eos # (Auto)  0.0 K/uL (0.0-0.7)   07/20/18  05:45    


 


Baso # (Auto)  0.0 K/uL (0.0-0.2)   07/20/18  05:45    


 


Neutrophils % (Manual)  79 % (42-75)  H  07/20/18  05:45    


 


Band Neutrophils %  1 % (0-2)   07/20/18  05:45    


 


Lymphocytes % (Manual)  8 % (20-50)  L  07/20/18  05:45    


 


Monocytes % (Manual)  12 % (0-10)  H  07/20/18  05:45    


 


Platelet Estimate  Normal  (NORMAL)   07/20/18  05:45    


 


Large Platelets  Present   07/20/18  05:45    


 


Giant Platelets  Present   07/20/18  05:45    


 


RBC Morphology  Normal  (NORMAL)   07/20/18  05:45    


 


Sodium  133 mmol/l (132-148)   07/20/18  05:45    


 


Potassium  4.8 MMOL/L (3.6-5.0)   07/20/18  05:45    


 


Chloride  89 mmol/L ()  L  07/20/18  05:45    


 


Carbon Dioxide  29 mmol/L (22-30)   07/20/18  05:45    


 


Anion Gap  20  (10-20)   07/20/18  05:45    


 


BUN  4 mg/dl (7-17)  L  07/20/18  05:45    


 


Creatinine  0.4 mg/dl (0.7-1.2)  L  07/20/18  05:45    


 


Est GFR ( Amer)  > 60   07/20/18  05:45    


 


Est GFR (Non-Af Amer)  > 60   07/20/18  05:45    


 


POC Glucose (mg/dL)  154 mg/dL ()  H  07/21/18  05:34    


 


Random Glucose  216 mg/dL ()  H  07/20/18  05:45    


 


Calcium  10.6 mg/dL (8.4-10.2)  H  07/20/18  05:45    


 


Total Bilirubin  0.8 mg/dl (0.2-1.3)   07/20/18  05:45    


 


AST  55 U/L (14-36)  H  07/20/18  05:45    


 


ALT  31 U/L (9-52)   07/20/18  05:45    


 


Alkaline Phosphatase  168 U/L ()  H  07/20/18  05:45    


 


Total Protein  9.6 G/DL (6.3-8.2)  H  07/20/18  05:45    


 


Albumin  4.8 g/dL (3.5-5.0)   07/20/18  05:45    


 


Globulin  4.8 gm/dL (2.2-3.9)  H  07/20/18  05:45    


 


Albumin/Globulin Ratio  1.0  (1.0-2.1)   07/20/18  05:45    


 


Lipase  52 U/L ()   07/19/18  07:20    


 


Urine Color  Yellow  (YELLOW)   07/19/18  10:14    


 


Urine Clarity  Slighty-cloudy  (Clear)   07/19/18  10:14    


 


Urine pH  8.0  (5.0-8.0)   07/19/18  10:14    


 


Ur Specific Gravity  1.036  (1.003-1.030)  H  07/19/18  10:14    


 


Urine Protein  Negative mg/dL (NEGATIVE)   07/19/18  10:14    


 


Urine Glucose (UA)  150 mg/dL (Normal)   07/19/18  10:14    


 


Urine Ketones  Trace mg/dL (NEGATIVE)   07/19/18  10:14    


 


Urine Blood  Negative  (NEGATIVE)   07/19/18  10:14    


 


Urine Nitrate  Negative  (NEGATIVE)   07/19/18  10:14    


 


Urine Bilirubin  Negative  (NEGATIVE)   07/19/18  10:14    


 


Urine Urobilinogen  0.2-1.0 mg/dL (0.2-1.0)   07/19/18  10:14    


 


Ur Leukocyte Esterase  Neg Delia/uL (Negative)   07/19/18  10:14    


 


Urine RBC (Auto)  < 1 /hpf (0-3)   07/19/18  10:14    


 


Urine Microscopic WBC  2 /hpf (0-5)   07/19/18  10:14    


 


Ur Squamous Epith Cells  6 /hpf (0-5)  H  07/19/18  10:14    


 


Urine Bacteria  Rare  (<OCC)   07/19/18  10:14    














- Hospital Course


Hospital Course: 





47 y/o woman w/ pmh of HTN, chronic back pain, Colon cancer w/ metastasis to 

liver, Depression, Fibromyalgia, GERD presents to ED with persistent lower back 

and lower abdominal pain.  Patient seen by pain management for abdominal and 

back pain.  Patient s/p bilateral sacroiliac joint injections for back pain.  

Patient's pain medications also adjusted for better control.  Patient also 

treated for constipation and had bowel movement s/p suppository.  Patient 

reports better pain control and feeling better overall.  Patient has been seen, 

examined, and deemed medically fit for discharge home.  Patient discharged and 

given script for dilaudid 2 mg PO Q8h by pain management and script for 

tramadol 50 mg PO Q6h by medical team.  Patient is to follow up w/ Dr. Winn for 

pain management and w/ [facility] in 1-2 weeks.





Discharge Exam





- Head Exam


Head Exam: ATRAUMATIC, NORMAL INSPECTION, NORMOCEPHALIC





- Eye Exam


Eye Exam: Normal appearance





- ENT Exam


ENT Exam: Mucous Membranes Moist





- Neck Exam


Neck exam: Full Rom





- Respiratory Exam


Respiratory Exam: Clear to PA & Lateral.  absent: Accessory Muscle Use, 

Decreased Breath Sounds, Rales, Rhonchi, Wheezes, Respiratory Distress





- Cardiovascular Exam


Cardiovascular Exam: REGULAR RHYTHM, RRR.  absent: Tachycardia





- GI/Abdominal Exam


GI & Abdominal Exam: Normal Bowel Sounds, Soft, Tenderness (minimal tenderness)

.  absent: Distended





- Extremities Exam


Extremities exam: normal inspection





- Neurological Exam


Neurological exam: Alert, Oriented x3





- Skin


Skin Exam: Dry, Intact, Normal Color, Warm





Discharge Plan





- Follow Up Plan


Condition: GOOD


Disposition: HOME/ ROUTINE


Instructions:  Acute Abdomen (Belly Pain), Adult (DC), Nausea and Vomiting, 

Adult (DC), Hydromorphone, Corticosteroid Joint Injection


Additional Instructions: 


follow up with primary md 1 week 


Referrals: 


Norah Estrada MD [Family Provider] - 


Luciano Winn-Berhane Clifton MD [Staff Provider] -

## 2018-07-27 ENCOUNTER — HOSPITAL ENCOUNTER (INPATIENT)
Dept: HOSPITAL 14 - H.ER | Age: 48
LOS: 4 days | Discharge: HOME | DRG: 243 | End: 2018-07-31
Attending: FAMILY MEDICINE | Admitting: FAMILY MEDICINE
Payer: MEDICAID

## 2018-07-27 VITALS — BODY MASS INDEX: 31.9 KG/M2

## 2018-07-27 DIAGNOSIS — E11.65: ICD-10-CM

## 2018-07-27 DIAGNOSIS — I10: ICD-10-CM

## 2018-07-27 DIAGNOSIS — R11.2: ICD-10-CM

## 2018-07-27 DIAGNOSIS — F32.9: ICD-10-CM

## 2018-07-27 DIAGNOSIS — R10.11: ICD-10-CM

## 2018-07-27 DIAGNOSIS — R10.31: ICD-10-CM

## 2018-07-27 DIAGNOSIS — M54.5: ICD-10-CM

## 2018-07-27 DIAGNOSIS — F41.9: ICD-10-CM

## 2018-07-27 DIAGNOSIS — G89.29: ICD-10-CM

## 2018-07-27 DIAGNOSIS — C18.9: ICD-10-CM

## 2018-07-27 DIAGNOSIS — C78.7: ICD-10-CM

## 2018-07-27 DIAGNOSIS — Z79.899: ICD-10-CM

## 2018-07-27 DIAGNOSIS — M54.6: Primary | ICD-10-CM

## 2018-07-27 DIAGNOSIS — K21.9: ICD-10-CM

## 2018-07-27 DIAGNOSIS — M79.7: ICD-10-CM

## 2018-07-27 DIAGNOSIS — K59.00: ICD-10-CM

## 2018-07-27 LAB
ALBUMIN SERPL-MCNC: 4.9 G/DL (ref 3.5–5)
ALBUMIN/GLOB SERPL: 1 {RATIO} (ref 1–2.1)
ALT SERPL-CCNC: 65 U/L (ref 9–52)
AST SERPL-CCNC: 101 U/L (ref 14–36)
BASOPHILS # BLD AUTO: 0 K/UL (ref 0–0.2)
BASOPHILS NFR BLD: 0.3 % (ref 0–2)
BUN SERPL-MCNC: 9 MG/DL (ref 7–17)
CALCIUM SERPL-MCNC: 10.5 MG/DL (ref 8.4–10.2)
EOSINOPHIL # BLD AUTO: 0.1 K/UL (ref 0–0.7)
EOSINOPHIL NFR BLD: 1.3 % (ref 0–4)
ERYTHROCYTE [DISTWIDTH] IN BLOOD BY AUTOMATED COUNT: 14.4 % (ref 11.5–14.5)
GFR NON-AFRICAN AMERICAN: > 60
HGB BLD-MCNC: 15.3 G/DL (ref 12–16)
LYMPHOCYTES # BLD AUTO: 1.1 K/UL (ref 1–4.3)
LYMPHOCYTES NFR BLD AUTO: 13 % (ref 20–40)
MCH RBC QN AUTO: 29.2 PG (ref 27–31)
MCHC RBC AUTO-ENTMCNC: 33.5 G/DL (ref 33–37)
MCV RBC AUTO: 87.1 FL (ref 81–99)
MONOCYTES # BLD: 0.9 K/UL (ref 0–0.8)
MONOCYTES NFR BLD: 10.3 % (ref 0–10)
NEUTROPHILS # BLD: 6.5 K/UL (ref 1.8–7)
NEUTROPHILS NFR BLD AUTO: 75.1 % (ref 50–75)
NRBC BLD AUTO-RTO: 0.1 % (ref 0–0)
PLATELET # BLD: 305 K/UL (ref 130–400)
PMV BLD AUTO: 8.7 FL (ref 7.2–11.7)
RBC # BLD AUTO: 5.23 MIL/UL (ref 3.8–5.2)
WBC # BLD AUTO: 8.7 K/UL (ref 4.8–10.8)

## 2018-07-27 RX ADMIN — INSULIN LISPRO SCH: 100 INJECTION, SOLUTION INTRAVENOUS; SUBCUTANEOUS at 22:16

## 2018-07-27 RX ADMIN — INSULIN LISPRO SCH: 100 INJECTION, SOLUTION INTRAVENOUS; SUBCUTANEOUS at 18:33

## 2018-07-27 NOTE — CP.PCM.HP
<Hipolito Francis - Last Filed: 07/27/18 16:35>





History of Present Illness





- History of Present Illness


History of Present Illness: 


49 Y/O female with PMHx of HTN, chronic back pain, Colon cancer w/ metastasis 

to liver, Depression, Fibromyalgia, GERD presents to ED with back pain 

associated with nausea and vomiting x 2 days. Patient was admitted to [facility] on 7/ 19/18 for abdominal pain and back pain. Patient s/p bilateral sacroiliac joint 

injections for back pain on 7/21. Patient discharged and given script for 

dilaudid 2 mg PO Q8h by pain management and script for tramadol 50 mg PO Q6h by 

medical team. Today patient reports 10/10, sharp, continuous, bilateral, mid 

thoracic back pain which radiates to front. Patient also reports associated 

nausea and vomiting. Patient denies any CP, SOB, headache, dizziness, dysuria, 

diarrhea. Patient refused further history taking at this time due to severe 

pain. Dr. Winn in ED for further pain management.





ED Course


- CMP, CBC (with differential)


- Dilaudid 1 mg IVP


- Sodium Chloride 0.9% 1,000 ml  mls/hr


- Zofran Inj


- Fantanyl 50 mcg/hr TD patch applied


- Dilaudid 2 mg IVP Q4hr PRN


- LS Spine AP/LAT


- CT Chest W contrast





PCP: Dr. Edmundo Kaur


[facility]-Oncologist: Dr. Hilton Sandoval at [facility]


Pain management: Dr. Winn


PMH: chronic back pain, fibromyalgia, HTN, GERD, colon cancer, anxiety


PSH: C6 fusion, Colon resection 2015  and b/l oopherectomy, chemoembolization 

of liver.


SocialHx: denies ETOH/tobacco/drug abuse


FamilyHx: DM, HTN


Meds: as per med rec


Hospitalization: multiple including 2 ER visit and 1 inpatient in past 1 month.














Present on Admission





- Present on Admission


Any Indicators Present on Admission: No


History of DVT/PE: No


History of Uncontrolled Diabetes: Yes


Urinary Catheter: No


Decubitus Ulcer Present: No





Review of Systems





- Constitutional


Constitutional: absent: Anorexia, Chills, Fever, Headache, Lethargy





- EENT


Eyes: absent: Change in Vision





- Cardiovascular


Cardiovascular: absent: Chest Pain, Chest Pain at Rest, Dyspnea on Exertion, 

Edema, Leg Edema, Palpitations





- Respiratory


Respiratory: absent: Dyspnea





- Gastrointestinal


Gastrointestinal: Abdominal Pain, Change in Bowel Habits, Constipation, Nausea, 

Vomiting.  absent: Diarrhea, Dysphagia, Heartburn, Hematemesis, Hematochezia





- Genitourinary


Genitourinary: absent: Dysuria, Flank Pain, Hematuria, Urinary Incontinence, 

Urinary Frequency, Urinary Urgency





- Musculoskeletal


Musculoskeletal: Back Pain, Myalgias.  absent: Muscle Weakness





- Integumentary


Integumentary: absent: Swelling





- Neurological


Neurological: absent: Dizziness, Numbness, Focal Weakness, Headaches





- Psychiatric


Psychiatric: absent: Anxiety





Past Patient History





- Infectious Disease


Hx of Infectious Diseases: None





- Tetanus Immunizations


Tetanus Immunization: Unknown





- Past Medical History & Family History


Past Medical History?: Yes





- Past Social History


Smoking Status: Never Smoked





- CARDIAC


Hx Hypertension: Yes





- PULMONARY


Hx Asthma: No


Hx Bronchitis: No


Hx Chronic Obstructive Pulmonary Disease (COPD): No


Hx Emphysema: No


Hx Pneumonia: No


Hx Pulmonary Embolism: No


Hx Sleep Apnea: No





- NEUROLOGICAL


Hx Alzheimer's Disease: No


Hx Dementia: No


Hx Migraine: No


Hx Multiple Sclerosis: No


Hx Parkinson's Disease: No


Hx Seizures: No


Hx Transient Ischemic Attacks (TIA): No





- HEENT


Hx HEENT Problems: No





- RENAL


Hx Kidney Stones: No





- ENDOCRINE/METABOLIC


Hx Hyperthyroidism: No


Hx Hypothyroidism: No





- INTEGUMENTARY


Hx Dermatological Problems: No





- GENITOURINARY/GYNECOLOGICAL


Hx Sexually Transmitted Disorders: No





- PSYCHIATRIC


Hx Anxiety: Yes


Hx Depression: Yes





- SURGICAL HISTORY


Hx Appendectomy: No


Hx Carotid Endarterectomy: No


Hx Cholecystectomy: No


Hx Coronary Artery Bypass Graft: No


Hx Coronary Stent: No


Hx Tonsillectomy: No





- ANESTHESIA


Hx Anesthesia: Yes


Hx Anesthesia Reactions: No


Hx Malignant Hyperthermia: No





Meds


Allergies/Adverse Reactions: 


 Allergies











Allergy/AdvReac Type Severity Reaction Status Date / Time


 


penicillin G Allergy  RASH Verified 09/27/17 11:19


 


vancomycin Allergy  RASH Verified 09/27/17 11:19














Physical Exam





- Constitutional


Appears: In Acute Distress


Additional comments: 


Patient in fetal position due to severe back pain.








- Head Exam


Head Exam: ATRAUMATIC, NORMAL INSPECTION, NORMOCEPHALIC





- Eye Exam


Eye Exam: EOMI, Normal appearance, PERRL





- ENT Exam


ENT Exam: Mucous Membranes Moist





- Respiratory Exam


Respiratory Exam: Clear to Auscultation Bilateral, NORMAL BREATHING PATTERN.  

absent: Rales, Rhonchi, Wheezes, Respiratory Distress





- Cardiovascular Exam


Cardiovascular Exam: REGULAR RHYTHM, +S1, +S2.  absent: Systolic Murmur





- GI/Abdominal Exam


GI & Abdominal Exam: Normal Bowel Sounds, Soft, Tenderness.  absent: Distended, 

Rigid





- Back Exam


Back exam: paraspinal tenderness.  absent: CVA tenderness (L), CVA tenderness (R

), FULL ROM, rash noted


Additional comments: 


Bilateral lower thoracic tenderness +, B/L Lower lumbar tenderness


(-) CVA tenderness








- Neurological Exam


Neurological exam: Alert, Oriented x3


Additional comments: 


Deferred at this time due to severe pain





- Psychiatric Exam


Additional comments: 


Deferred at this time due to severe pain





- Skin


Skin Exam: Dry, Intact, Normal Color





Results





- Vital Signs


Recent Vital Signs: 





 Last Vital Signs











Temp  97.4 F L  07/27/18 14:04


 


Pulse  116 H  07/27/18 14:04


 


Resp  22   07/27/18 14:04


 


BP  136/94 H  07/27/18 14:04


 


Pulse Ox  100   07/27/18 14:54














- Labs


Result Diagrams: 


 07/27/18 15:05





 07/27/18 15:05





Assessment & Plan





- Assessment and Plan (Free Text)


Assessment: 


49 Y/O female with PMHx of HTN, chronic back pain, Colon cancer w/ metastasis 

to liver, Depression, Fibromyalgia, GERD presents to ED back pain associated 

with nausea and vomiting x 2 days


Plan: 


 Midthoracic back pain


- Chronic, H/O multiple lumbar surgeries. 


- CBC, CMP ordered in ED, Pending result


- Dilaudid 1 mg IVP in ED


- Dulaudid 2 mg Q4hr PRN for back pain.


- Sodium Chloride 0.9% 1,000 ml  mls/hr


- LS Spine AP/LAT


- CT Chest with contrast


- Dr Winn in ED for evaluation.


- F/U CT chest, CMP, UA


- Pain Management: Will follow recommendations.





Nausea and vomiting


- Zofran Inj


- Sodium Chloride 0.9% 1,000 ml  mls/hr





NIDDM


- Uncontrolled


- Last A1c 7.7


- Pt denies taking home meds for DM


- Insulin lispro scale and Hypoglycemia protocol  


- Accucheck AM


- F/U as an outpatient basis.





HTN


- Uncontrolled


- Amlodipine 10 mg QD


- Will monitor vitals Q8hr





Colon cancer w/metastasis to liver


- CT abdomen/pelvis consistent with liver metastasis, unchanged from previous 

CT on 7/11


- Received 5 FU chemo at [place].


- Pt to F/U with Dr. Sandoval on July 30





Fibromyalgia


- Controlled


- Duloxetine 60 mg PO HS





DVT Prophylaxis


- Lovenox 40 mg SC daily





<Martha Melton - Last Filed: 07/28/18 09:08>





Results





- Vital Signs


Recent Vital Signs: 





 Last Vital Signs











Temp  98.2 F   07/28/18 08:14


 


Pulse  95 H  07/28/18 08:14


 


Resp  20   07/28/18 08:14


 


BP  146/97 H  07/28/18 08:14


 


Pulse Ox  97   07/28/18 08:14














- Labs


Result Diagrams: 


 07/27/18 15:05





 07/28/18 05:20


Labs: 





 Laboratory Results - last 24 hr











  07/27/18 07/27/18 07/27/18





  15:05 15:05 18:32


 


WBC  8.7  


 


RBC  5.23 H  


 


Hgb  15.3  


 


Hct  45.6  


 


MCV  87.1  


 


MCH  29.2  


 


MCHC  33.5  


 


RDW  14.4  


 


Plt Count  305  


 


MPV  8.7  


 


Neut % (Auto)  75.1 H  


 


Lymph % (Auto)  13.0 L  


 


Mono % (Auto)  10.3 H  


 


Eos % (Auto)  1.3  


 


Baso % (Auto)  0.3  


 


Neut # (Auto)  6.5  


 


Lymph # (Auto)  1.1  


 


Mono # (Auto)  0.9 H  


 


Eos # (Auto)  0.1  


 


Baso # (Auto)  0.0  


 


Sodium   134 


 


Potassium   4.8 


 


Chloride   93 L 


 


Carbon Dioxide   27 


 


Anion Gap   19 


 


BUN   9 


 


Creatinine   0.5 L 


 


Est GFR ( Amer)   > 60 


 


Est GFR (Non-Af Amer)   > 60 


 


POC Glucose (mg/dL)    90


 


Random Glucose   187 H 


 


Calcium   10.5 H 


 


Total Bilirubin   0.7 


 


AST   101 H D 


 


ALT   65 H D 


 


Alkaline Phosphatase   173 H 


 


Total Protein   9.7 H 


 


Albumin   4.9 


 


Globulin   4.8 H 


 


Albumin/Globulin Ratio   1.0 














  07/27/18 07/28/18 07/28/18





  21:50 05:20 05:40


 


WBC   


 


RBC   


 


Hgb   


 


Hct   


 


MCV   


 


MCH   


 


MCHC   


 


RDW   


 


Plt Count   


 


MPV   


 


Neut % (Auto)   


 


Lymph % (Auto)   


 


Mono % (Auto)   


 


Eos % (Auto)   


 


Baso % (Auto)   


 


Neut # (Auto)   


 


Lymph # (Auto)   


 


Mono # (Auto)   


 


Eos # (Auto)   


 


Baso # (Auto)   


 


Sodium   136 


 


Potassium   5.0 


 


Chloride   93 L 


 


Carbon Dioxide   30 


 


Anion Gap   18 


 


BUN   7 


 


Creatinine   0.5 L 


 


Est GFR ( Amer)   > 60 


 


Est GFR (Non-Af Amer)   > 60 


 


POC Glucose (mg/dL)  159 H   143 H


 


Random Glucose   157 H 


 


Calcium   10.3 H 


 


Total Bilirubin   0.7 


 


AST   79 H D 


 


ALT   58 H 


 


Alkaline Phosphatase   159 H 


 


Total Protein   9.1 H 


 


Albumin   4.7 


 


Globulin   4.4 H 


 


Albumin/Globulin Ratio   1.1 














Attending/Attestation





- Attestation


I have personally seen and examined this patient.: Yes


I have fully participated in the care of the patient.: Yes


I have reviewed all pertinent clinical information: Yes

## 2018-07-27 NOTE — CT
Date of service: 



07/27/2018



PROCEDURE:  CT Chest with contrast



HISTORY:

Back pain. 



Relevant medical history: History of colon cancer. 



COMPARISON:

None.



TECHNIQUE:

Contiguous axial images were obtained through the chest with 

intravenous contrast enhancement. Sagittal and coronal 

reconstructions were performed.



IV contrast: 95 cc Omnipaque 300



Radiation dose (DLP): 425.58 mGy-cm.



This CT exam was performed using one or more of the following dose 

reduction techniques: Automated exposure control, adjustment of the 

mA and/or kV according to patient size, and/or use of iterative 

reconstruction technique.



FINDINGS:



LUNGS:

Clear lungs. Visualized airway clear.



MEDIASTINUM:

Unremarkable thoracic aorta. No aneurysm or dissection. Normal sized 

heart. Main pulmonary artery unremarkable. No vascular congestion. No 

lymphadenopathy.



PLEURA:

No pleural fluid. No pneumothorax.



BONES:

No fracture. No destructive lesion. 



UPPER ABDOMEN:

Incompletely visualized hepatic metastatic disease, findings 

identified on cross-sectional imaging studies [id number].



OTHER FINDINGS:

None.



IMPRESSION:

Unremarkable contrast enhanced CT of the chest. No suspicious 

pulmonary nodules, masses or infiltrates.  No significant findings 

within the mediastinum. 



Incompletely visualized hepatic metastatic disease.

## 2018-07-27 NOTE — ED PDOC
HPI: Back


Time Seen by Provider: 07/27/18 14:28


Chief Complaint (Nursing): Back Pain


Chief Complaint (Provider): back pain


History Per: Patient


History/Exam Limitations: no limitations


Onset/Duration Of Symptoms: Days (x 2)


Current Symptoms Are (Timing): Still Present


Associated Symptoms: Other (nausea and vomiting)


Additional History Per: Prior Records


Additional Complaint(s): 





48-year-old male, with past medical history of colon ca with mets to liver and 

intractable back pain, presents to ED for back pain associated with nausea and 

vomiting x 2 days. Pt was recently admitted. Pt has had back pain in the past. 

No improvement with PO Dilaudid.











PMD: Carlin Kaur





Past Medical History


Reviewed: Historical Data, Nursing Documentation, Vital Signs


Vital Signs: 


 Last Vital Signs











Temp  97.4 F L  07/27/18 14:04


 


Pulse  116 H  07/27/18 14:04


 


Resp  22   07/27/18 14:04


 


BP  136/94 H  07/27/18 14:04


 


Pulse Ox  100   07/27/18 14:04














- Medical History


PMH: Anxiety, Back Problems, Depression, Diabetes, Fibromyalgia, GERD, HTN, 

Malignancy


Other PMH: colon ca wth mets to liver





- Surgical History


Surgical History: Back Surgery, Endoscopy





- Family History


Family History: States: Unknown Family Hx





- Immunization History


Hx Influenza Vaccination: Yes


Hx Pneumococcal Vaccination: No





- Home Medications


Home Medications: 


 Ambulatory Orders











 Medication  Instructions  Recorded


 


amLODIPine [Norvasc] 10 mg PO DAILY #0 tab 10/12/15


 


DULoxetine [Cymbalta] 60 mg PO HS 10/30/15


 


Acetaminophen with Codeine 1 tab PO Q6 PRN 05/27/18





[Tylenol with Codeine #3 Tablet]  


 


Magnesium Hydroxide [Milk Of 30 ml PO DAILY 07/19/18





Magnesia]  


 


Omeprazole [Omeprazole] 40 mg PO DAILY 07/19/18


 


Polyethylene Glycol 3350 [Miralax] 17 gm PO BID 07/19/18


 


Tramadol HCl [Tramadol HCl ER] 200 mg PO DAILY 07/19/18














- Allergies


Allergies/Adverse Reactions: 


 Allergies











Allergy/AdvReac Type Severity Reaction Status Date / Time


 


penicillin G Allergy  RASH Verified 09/27/17 11:19


 


vancomycin Allergy  RASH Verified 09/27/17 11:19














Review of Systems


ROS Statement: Except As Marked, All Systems Reviewed And Found Negative


Gastrointestinal: Positive for: Nausea


Musculoskeletal: Positive for: Back Pain





Physical Exam





- Reviewed


Nursing Documentation Reviewed: Yes


Vital Signs Reviewed: Yes





- Physical Exam


Gastrointestinal/Abdominal: Positive for: Normal Exam, Soft.  Negative for: 

Tenderness


Back: Positive for: Normal Inspection.  Negative for: Vertebral Tenderness, 

Other (deformity)


Neurologic/Psych: Positive for: Alert, Oriented (x 3).  Negative for: Motor/

Sensory Deficits





- ECG


O2 Sat by Pulse Oximetry: 100 (RA)


Pulse Ox Interpretation: Normal





Medical Decision Making


Medical Decision Making: 





Plan:


- CMP


- CBC (with differential)


- Dilaudid 1 mg IVP


- Sodium Chloride 0.9% 1,000 ml  mls/hr


- Zofran Inj


- LS Spine AP/LAT








14:36


ED Provider Dr. Renteria discussed case with Dr. Winn. Dr. Winn agrees to evaluate 

patient in ED.





14:50


Dr. Winn in ED.








--------------------------------------------------------------------------------

-----------------


Scribe Attestation:


Documented by Juan Manuel Heller, acting as a scribe for Gustavo Renteria MD





Provider Scribe Attestation:


All medical record entries made by the Scribe were at my direction and 

personally dictated by me. I have reviewed the chart and agree that the record 

accurately reflects my personal performance of the history, physical exam, 

medical decision making, and the department course for this patient. I have 

also personally directed, reviewed, and agree with the discharge instructions 

and disposition.





Disposition





- Clinical Impression


Clinical Impression: 


 Colon cancer metastasized to liver, Intractable low back pain








- Patient ED Disposition


Is Patient to be Admitted: Yes





- Disposition


Disposition Time: 14:54


Condition: FAIR


Forms:  [facility] (English)





- Pt Status Changed To:


Hospital Disposition Of: Inpatient





- Admit Certification


Admit to Inpatient:: After my assessment, the patient will require 

hospitalization for at least two midnights.  This is because of the severity of 

symptoms shown, intensity of services needed, and/or the medical risk in this 

patient being treated as an outpatient.





- POA


Present On Arrival: None

## 2018-07-27 NOTE — RAD
Date of service: 



07/27/2018



PROCEDURE:  Radiographs of the Lumbar Spine.



HISTORY:

Liver Ca with mets







COMPARISON:

CT scan of the abdomen and pelvis dated 07/19/2018.



FINDINGS:



BONES:

Normal alignment. No listhesis. No fracture.



DISC SPACES:

Prior L4-5 discectomy with disc spacer placement.



OTHER FINDINGS:

Excreted intravenous contrast in both renal collecting systems and 

urinary bladder.  Left pelvic surgical clips.



IMPRESSION:

L4-5 disc spacer.  Otherwise, unremarkable radiographs of the lumbar 

spine.

## 2018-07-28 LAB
ALBUMIN SERPL-MCNC: 4.7 G/DL (ref 3.5–5)
ALBUMIN/GLOB SERPL: 1.1 {RATIO} (ref 1–2.1)
ALT SERPL-CCNC: 58 U/L (ref 9–52)
APTT BLD: 32.5 SECONDS (ref 25.6–37.1)
AST SERPL-CCNC: 79 U/L (ref 14–36)
BUN SERPL-MCNC: 7 MG/DL (ref 7–17)
CALCIUM SERPL-MCNC: 10.3 MG/DL (ref 8.4–10.2)
GFR NON-AFRICAN AMERICAN: > 60
INR PPP: 1.3 (ref 0.9–1.2)
PROTHROMBIN TIME: 14.5 SECONDS (ref 9.8–13.1)

## 2018-07-28 RX ADMIN — PANTOPRAZOLE SODIUM SCH MG: 40 TABLET, DELAYED RELEASE ORAL at 08:57

## 2018-07-28 RX ADMIN — INSULIN LISPRO SCH: 100 INJECTION, SOLUTION INTRAVENOUS; SUBCUTANEOUS at 06:35

## 2018-07-28 RX ADMIN — INSULIN LISPRO SCH: 100 INJECTION, SOLUTION INTRAVENOUS; SUBCUTANEOUS at 21:43

## 2018-07-28 RX ADMIN — ENOXAPARIN SODIUM SCH MG: 40 INJECTION SUBCUTANEOUS at 08:56

## 2018-07-28 RX ADMIN — INSULIN LISPRO SCH: 100 INJECTION, SOLUTION INTRAVENOUS; SUBCUTANEOUS at 17:10

## 2018-07-28 RX ADMIN — INSULIN LISPRO SCH: 100 INJECTION, SOLUTION INTRAVENOUS; SUBCUTANEOUS at 11:33

## 2018-07-28 NOTE — CP.PCM.PN
<Hipolito Francis - Last Filed: 07/28/18 12:34>





Subjective





- Date & Time of Evaluation


Date of Evaluation: 07/28/18


Time of Evaluation: 09:00





- Subjective


Subjective: 


Patient evaluated and examined at bedside in AM. Patient in mild distress. 

Still reports back pain and abdominal pain. Nausea and vomiting resolved. No CP

, SOB, headache, dizziness, diarrhea, nausea, or constipation. Dr. Winn on 

board. Back injections(epidural vs celiac plexus) on Monday. Some physical exam 

deferred due to pain. 








Objective





- Vital Signs/Intake and Output


Vital Signs (last 24 hours): 


 











Temp Pulse Resp BP Pulse Ox


 


 98.2 F   95 H  20   146/97 H  97 


 


 07/28/18 08:14  07/28/18 08:14  07/28/18 08:14  07/28/18 08:14  07/28/18 08:14











- Medications


Medications: 


 Current Medications





Amlodipine Besylate (Norvasc)  10 mg PO HS [facility]


   Last Admin: 07/27/18 22:11 Dose:  10 mg


Dextrose (Dextrose 50% Inj)  0 ml IV STAT PRN; Protocol


   PRN Reason: Hypoglycemia Protocol


Dextrose (Glutose 15)  0 gm PO ONCE PRN; Protocol


   PRN Reason: Hypoglycemia Protocol


Docusate Sodium (Colace)  100 mg PO BID [facility]


   Last Admin: 07/28/18 08:56 Dose:  100 mg


Duloxetine HCl (Cymbalta)  60 mg PO HS [facility]


   Last Admin: 07/27/18 22:09 Dose:  60 mg


Enoxaparin Sodium (Lovenox)  40 mg SC DAILY [facility]


   PRN Reason: Protocol


   Last Admin: 07/28/18 08:56 Dose:  40 mg


Fentanyl (Duragesic)  1 patch TD Q3D [facility]


   PRN Reason: Protocol


   Last Admin: 07/27/18 15:13 Dose:  1 patch


Glucagon (Glucagen Diagnostic Kit)  0 mg IM STAT PRN; Protocol


   PRN Reason: Hypoglycemia Protocol


Hydromorphone HCl (Dilaudid)  1.5 mg IVP Q3 PRN


   PRN Reason: Pain, severe (8-10)


   Last Admin: 07/28/18 10:18 Dose:  1.5 mg


Insulin Human Lispro (Humalog)  0 units SC ACHS [facility]


   PRN Reason: Protocol


   Last Admin: 07/28/18 06:35 Dose:  Not Given


Ondansetron HCl (Zofran Inj)  4 mg IVP Q6 PRN


   PRN Reason: Nausea/Vomiting


Pantoprazole Sodium (Protonix Ec Tab)  40 mg PO DAILY [facility]


   Last Admin: 07/28/18 08:57 Dose:  40 mg


Tramadol HCl (Ultram)  50 mg PO Q6 PRN


   PRN Reason: Pain, moderate (4-7)


   Last Admin: 07/28/18 04:21 Dose:  50 mg











- Labs


Labs: 


 





 07/27/18 15:05 





 07/28/18 05:20 











- Constitutional


Appears: Well, Non-toxic, No Acute Distress





- Head Exam


Head Exam: ATRAUMATIC, NORMAL INSPECTION, NORMOCEPHALIC





- Eye Exam


Eye Exam: EOMI, Normal appearance, PERRL


Pupil Exam: NORMAL ACCOMODATION, PERRL





- ENT Exam


ENT Exam: Mucous Membranes Moist





- Respiratory Exam


Respiratory Exam: Clear to Ausculation Bilateral, NORMAL BREATHING PATTERN.  

absent: Rales, Rhonchi, Wheezes





- Cardiovascular Exam


Cardiovascular Exam: REGULAR RHYTHM.  absent: +S1, +S2, Murmur





- GI/Abdominal Exam


GI & Abdominal Exam: Soft, Tenderness, Normal Bowel Sounds.  absent: Firm, 

Guarding, Rigid





- Back Exam


Back Exam: paraspinal tenderness, tenderness


Additional comments: 


B/L lower thoracic tenderness +








- Neurological Exam


Neurological Exam: Alert, Awake, Oriented x3





- Psychiatric Exam


Psychiatric exam: Normal Affect, Normal Mood





- Skin


Skin Exam: Dry, Intact, Normal Color





Assessment and Plan





- Assessment and Plan (Free Text)


Assessment: 


49 Y/O female with PMHx of HTN, chronic back pain, Colon cancer w/ metastasis 

to liver, Depression, Fibromyalgia, GERD presents to ED back pain associated 

with nausea and vomiting x 2 days.





Plan: 


 Lower thoracic back pain


- Chronic, H/O multiple lumbar surgeries. 


- CBC, CMP ordered in ED, Pending result


- 2 mg IV Dilaudid, Zofran, Sodium Chloride 0.9% 1,000 ml  mls/hr in ED


- LS Spine AP/LAT: L4-5 disc spacer.  Otherwise, unremarkable radiographs of 

the lumbar spine.


- CT Chest with contrast: Unremarkable contrast enhanced CT of the chest. No 

suspicious pulmonary nodules, masses or infiltrates.  No significant findings 

within the mediastinum. Incompletely visualized hepatic metastatic disease.


- As per Pain Management: continue Fentanyl patch, increase Dilaudid to 1.5mg 

q3h IV PRN


- Consider injection on Monday, celiac plexus vs epidural injection


- hold Lovenox morning AM


- Coag profile pending


- NPO after midnight Sunday night





Nausea and vomiting


- Improving.


- Zofran 4 mg PRN





NIDDM


- Uncontrolled


- Last A1c 7.7


- Pt denies taking home meds for DM


- Insulin lispro scale and Hypoglycemia protocol  


- Accucheck AM


- F/U as an outpatient basis.





HTN


- Uncontrolled


- Amlodipine 10 mg QD


- Will monitor vitals Q8hr





Colon cancer w/metastasis to liver


- CT abdomen/pelvis consistent with liver metastasis, unchanged from previous 

CT on 7/11


- Thoracic/lumbar MRI in 3/2018 didn't reveal any metastatic diseases or 

significant stenosis.


- According to Dr. Hilton Sandoval's office patient's colon cancer is 

responding to chemotherapy and not spread to any other organ except liver.


- Received 5 FU chemo at [place].


- Unlikely F/U with Dr. Sandoval on July 30





Fibromyalgia


- Controlled


- Duloxetine 60 mg PO HS





DVT Prophylaxis


- Lovenox 40 mg SC daily








<Martha Melton - Last Filed: 07/29/18 09:02>





Objective





- Vital Signs/Intake and Output


Vital Signs (last 24 hours): 


 











Temp Pulse Resp BP Pulse Ox


 


 97.9 F   93 H  18   147/93 H  98 


 


 07/29/18 08:18  07/29/18 08:18  07/29/18 08:18  07/29/18 08:18  07/29/18 08:18











- Medications


Medications: 


 Current Medications





Amlodipine Besylate (Norvasc)  10 mg PO HS [facility]


   Last Admin: 07/28/18 22:31 Dose:  10 mg


Dextrose (Dextrose 50% Inj)  0 ml IV STAT PRN; Protocol


   PRN Reason: Hypoglycemia Protocol


Dextrose (Glutose 15)  0 gm PO ONCE PRN; Protocol


   PRN Reason: Hypoglycemia Protocol


Docusate Sodium (Colace)  100 mg PO BID [facility]


   Last Admin: 07/29/18 08:24 Dose:  100 mg


Duloxetine HCl (Cymbalta)  60 mg PO HS [facility]


   Last Admin: 07/28/18 21:31 Dose:  60 mg


Enoxaparin Sodium (Lovenox)  40 mg SC DAILY [facility]


   PRN Reason: Protocol


   Last Admin: 07/28/18 08:56 Dose:  40 mg


Fentanyl (Duragesic)  1 patch TD Q3D [facility]


   PRN Reason: Protocol


   Last Admin: 07/27/18 15:13 Dose:  1 patch


Glucagon (Glucagen Diagnostic Kit)  0 mg IM STAT PRN; Protocol


   PRN Reason: Hypoglycemia Protocol


Hydromorphone HCl (Dilaudid)  1.5 mg IVP Q3 PRN


   PRN Reason: Pain, severe (8-10)


   Last Admin: 07/29/18 08:56 Dose:  1.5 mg


Insulin Human Lispro (Humalog)  0 units SC ACHS [facility]


   PRN Reason: Protocol


   Last Admin: 07/29/18 08:25 Dose:  2 units


Ondansetron HCl (Zofran Inj)  4 mg IVP Q6 PRN


   PRN Reason: Nausea/Vomiting


Pantoprazole Sodium (Protonix Susp)  40 mg PO DAILY [facility]


Simethicone (Mylicon Chew Tab)  80 mg PO [facility] PRN


   PRN Reason: Flatulence


   Last Admin: 07/28/18 17:04 Dose:  80 mg


Tramadol HCl (Ultram)  50 mg PO Q6 PRN


   PRN Reason: Pain, moderate (4-7)


   Last Admin: 07/28/18 04:21 Dose:  50 mg











- Labs


Labs: 


 





 07/27/18 15:05 





 07/28/18 05:20 





 











PT  14.5 Seconds (9.8-13.1)  H  07/28/18  10:05    


 


INR  1.3  (0.9-1.2)  H  07/28/18  10:05    


 


APTT  32.5 Seconds (25.6-37.1)   07/28/18  10:05    














Attending/Attestation





- Attestation


I have personally seen and examined this patient.: Yes


I have fully participated in the care of the patient.: Yes


I have reviewed all pertinent clinical information, including history, physical 

exam and plan: Yes

## 2018-07-28 NOTE — CP.PCM.PN
Subjective





- Date & Time of Evaluation


Date of Evaluation: 07/28/18


Time of Evaluation: 09:30





- Subjective


Subjective: 


Patient presents with abdominal and thoracic/lumbar spinal pain.  She was 

admitted last week for similar symptoms, and received sacroiliac joint 

injections with mild relief.  She was seen at the clinic last Monday but had to 

come in yesterday due to intractable pain refractory to even Dilaudid PO at 

home.  This is a great departure for her in terms of her ability to handle pain

, for years she had been on Tramadol only and lately T#3, but now Dilaudid isn'

t able to help.  Thoracic/lumbar MRI in 3/2018 didn't reveal any metastatic 

diseases or significant stenosis.  CT chest yesterday was normal as well.  It's 

unclear what spurs the pain in the mid-back and lateral chest wall.  On 

examination this doesn't appear to be visceral pain from tumor load either.  





Fentanyl patch was placed yesterday around late afternoon, it should be taking 

effect about now.  Dilaudid 2mg was only lasting her 2 hours overnight.





Objective





- Vital Signs/Intake and Output


Vital Signs (last 24 hours): 


 











Temp Pulse Resp BP Pulse Ox


 


 98.2 F   95 H  20   146/97 H  97 


 


 07/28/18 08:14  07/28/18 08:14  07/28/18 08:14  07/28/18 08:14  07/28/18 08:14











- Medications


Medications: 


 Current Medications





Amlodipine Besylate (Norvasc)  10 mg PO HS [facility]


   Last Admin: 07/27/18 22:11 Dose:  10 mg


Dextrose (Dextrose 50% Inj)  0 ml IV STAT PRN; Protocol


   PRN Reason: Hypoglycemia Protocol


Dextrose (Glutose 15)  0 gm PO ONCE PRN; Protocol


   PRN Reason: Hypoglycemia Protocol


Docusate Sodium (Colace)  100 mg PO BID [facility]


   Last Admin: 07/28/18 08:56 Dose:  100 mg


Duloxetine HCl (Cymbalta)  60 mg PO HS [facility]


   Last Admin: 07/27/18 22:09 Dose:  60 mg


Enoxaparin Sodium (Lovenox)  40 mg SC DAILY [facility]


   PRN Reason: Protocol


   Last Admin: 07/28/18 08:56 Dose:  40 mg


Fentanyl (Duragesic)  1 patch TD Q3D [facility]


   PRN Reason: Protocol


   Last Admin: 07/27/18 15:13 Dose:  1 patch


Glucagon (Glucagen Diagnostic Kit)  0 mg IM STAT PRN; Protocol


   PRN Reason: Hypoglycemia Protocol


Hydromorphone HCl (Dilaudid)  2 mg IVP Q3 PRN


   PRN Reason: Pain, severe (8-10)


Insulin Human Lispro (Humalog)  0 units SC ACHS [facility]


   PRN Reason: Protocol


   Last Admin: 07/28/18 06:35 Dose:  Not Given


Ondansetron HCl (Zofran Inj)  4 mg IVP Q6 PRN


   PRN Reason: Nausea/Vomiting


Pantoprazole Sodium (Protonix Ec Tab)  40 mg PO DAILY [facility]


   Last Admin: 07/28/18 08:57 Dose:  40 mg


Tramadol HCl (Ultram)  50 mg PO Q6 PRN


   PRN Reason: Pain, moderate (4-7)


   Last Admin: 07/28/18 04:21 Dose:  50 mg











- Labs


Labs: 


 





 07/27/18 15:05 





 07/28/18 05:20 











- Respiratory Exam


Respiratory Exam: Chest Wall Tenderness





- Back Exam


Back Exam: paraspinal tenderness, vertebral tenderness





Assessment and Plan


(1) Intractable low back pain


Assessment & Plan: 


47 yo woman w/ metastatic colon cancer to the liver has intractable pain in the 

abdomen/spine.  Pain appears to be a combination of visceral and 

musculoskeletal origins.  It may be a reaction to the recent liver procedure as 

well.  Her opioid tolerance has appeared to increase dramatically recently, I 

am not sure if this is consistent with her disease.





- continue Fentanyl patch, its effects should be apparent by today/tomorrow


- increase Dilaudid to 2mg q3h IV PRN


- will consider injection on Monday, celiac plexus vs epidural injection


- hold Lovenox morning AM, check coag profile today, NPO after midnight Sunday 

night


Status: Acute

## 2018-07-29 RX ADMIN — PANTOPRAZOLE SODIUM SCH MG: 40 GRANULE, DELAYED RELEASE ORAL at 10:04

## 2018-07-29 RX ADMIN — INSULIN LISPRO SCH UNITS: 100 INJECTION, SOLUTION INTRAVENOUS; SUBCUTANEOUS at 08:25

## 2018-07-29 RX ADMIN — INSULIN LISPRO SCH: 100 INJECTION, SOLUTION INTRAVENOUS; SUBCUTANEOUS at 22:00

## 2018-07-29 RX ADMIN — PANTOPRAZOLE SODIUM SCH MG: 40 TABLET, DELAYED RELEASE ORAL at 08:25

## 2018-07-29 RX ADMIN — INSULIN LISPRO SCH UNITS: 100 INJECTION, SOLUTION INTRAVENOUS; SUBCUTANEOUS at 17:27

## 2018-07-29 NOTE — CP.PCM.PN
<Kemar Sandoval - Last Filed: 07/29/18 11:00>





Subjective





- Date & Time of Evaluation


Date of Evaluation: 07/29/18


Time of Evaluation: 08:15





- Subjective


Subjective: 





Patient seen and examined this morning at bedside.  There are no acute events 

overnight, NAD.  Patient reports pain is better controlled w/ medication but 

still present.  Patient denies headaches, chest pain, SOB, nausea, vomiting, 

diarrhea, dysuria, or fever.  Patient to have possible injections in OR 

tomorrow.





Objective





- Vital Signs/Intake and Output


Vital Signs (last 24 hours): 


 











Temp Pulse Resp BP Pulse Ox


 


 98.8 F   95 H  18   122/85   99 


 


 07/28/18 23:20  07/28/18 23:20  07/28/18 23:20  07/28/18 23:20  07/28/18 23:20











- Medications


Medications: 


 Current Medications





Amlodipine Besylate (Norvasc)  10 mg PO HS [facility]


   Last Admin: 07/28/18 22:31 Dose:  10 mg


Dextrose (Dextrose 50% Inj)  0 ml IV STAT PRN; Protocol


   PRN Reason: Hypoglycemia Protocol


Dextrose (Glutose 15)  0 gm PO ONCE PRN; Protocol


   PRN Reason: Hypoglycemia Protocol


Docusate Sodium (Colace)  100 mg PO BID [facility]


   Last Admin: 07/28/18 17:04 Dose:  100 mg


Duloxetine HCl (Cymbalta)  60 mg PO HS [facility]


   Last Admin: 07/28/18 21:31 Dose:  60 mg


Enoxaparin Sodium (Lovenox)  40 mg SC DAILY [facility]


   PRN Reason: Protocol


   Last Admin: 07/28/18 08:56 Dose:  40 mg


Fentanyl (Duragesic)  1 patch TD Q3D [facility]


   PRN Reason: Protocol


   Last Admin: 07/27/18 15:13 Dose:  1 patch


Glucagon (Glucagen Diagnostic Kit)  0 mg IM STAT PRN; Protocol


   PRN Reason: Hypoglycemia Protocol


Hydromorphone HCl (Dilaudid)  1.5 mg IVP Q3 PRN


   PRN Reason: Pain, severe (8-10)


   Last Admin: 07/29/18 05:26 Dose:  1.5 mg


Insulin Human Lispro (Humalog)  0 units SC ACHS [facility]


   PRN Reason: Protocol


   Last Admin: 07/28/18 21:43 Dose:  Not Given


Ondansetron HCl (Zofran Inj)  4 mg IVP Q6 PRN


   PRN Reason: Nausea/Vomiting


Pantoprazole Sodium (Protonix Ec Tab)  40 mg PO DAILY [facility]


   Last Admin: 07/28/18 08:57 Dose:  40 mg


Simethicone (Mylicon Chew Tab)  80 mg PO [facility] PRN


   PRN Reason: Flatulence


   Last Admin: 07/28/18 17:04 Dose:  80 mg


Tramadol HCl (Ultram)  50 mg PO Q6 PRN


   PRN Reason: Pain, moderate (4-7)


   Last Admin: 07/28/18 04:21 Dose:  50 mg











- Labs


Labs: 


 





 07/27/18 15:05 





 07/28/18 05:20 





 











PT  14.5 Seconds (9.8-13.1)  H  07/28/18  10:05    


 


INR  1.3  (0.9-1.2)  H  07/28/18  10:05    


 


APTT  32.5 Seconds (25.6-37.1)   07/28/18  10:05    














- Constitutional


Appears: Non-toxic, No Acute Distress





- Head Exam


Head Exam: ATRAUMATIC, NORMAL INSPECTION, NORMOCEPHALIC





- Eye Exam


Eye Exam: Normal appearance





- ENT Exam


ENT Exam: Mucous Membranes Moist





- Respiratory Exam


Respiratory Exam: Clear to Ausculation Bilateral.  absent: Accessory Muscle Use

, Decreased Breath Sounds, Rales, Rhonchi, Wheezes, Respiratory Distress





- Cardiovascular Exam


Cardiovascular Exam: REGULAR RHYTHM.  absent: Tachycardia





- GI/Abdominal Exam


GI & Abdominal Exam: Soft, Tenderness, Normal Bowel Sounds





- Extremities Exam


Extremities Exam: absent: Calf Tenderness





- Neurological Exam


Neurological Exam: Alert, Awake, Oriented x3





- Skin


Skin Exam: Dry, Intact, Normal Color, Warm





Assessment and Plan





- Assessment and Plan (Free Text)


Assessment: 





49 y/o woman w/ pmh of HTN, chronic back pain, Colon cancer w/ metastasis to 

liver, Depression, Fibromyalgia, GERD presents to ED w/ intractable back pain 

and failed outpatient management of back pain.


Plan: 





 Lower thoracic back pain


- Chronic, H/O multiple lumbar surgeries. 


- LS Spine AP/LAT: L4-5 disc spacer.  Otherwise, unremarkable radiographs of 

the lumbar spine.


- CT Chest with contrast: Unremarkable contrast enhanced CT of the chest. No 

suspicious pulmonary nodules, masses or infiltrates.  No significant findings 

within the mediastinum. Incompletely visualized hepatic metastatic disease.


- As per Pain Management: continue Fentanyl patch, increase Dilaudid to 1.5mg 

q3h IV PRN


- Consider injection on Monday, celiac plexus vs epidural injection


- hold Lovenox morning AM


- Coag profile: PT 14.5, INR 1.3, aPTT 32.5


- NPO after midnight Sunday night





Nausea and vomiting


- Improving


- afebrile, non-tachycardic, BP stable


- Zofran 4 mg Q6h IV PRN





NIDDM


- Uncontrolled


- Last A1c 7.7


- Pt denies taking home meds for DM


- Insulin lispro correction scale 


- Hypoglycemia protocol  


- Accucheck ACHS





HTN


- BP stable


- Amlodipine 10 mg QD


- continue to monitor 





Colon cancer w/metastasis to liver


- CT abdomen/pelvis consistent with liver metastasis, unchanged from previous 

CT on 7/11


- Thoracic/lumbar MRI in 3/2018 didn't reveal any metastatic diseases or 

significant stenosis.


- According to Dr. Hilton Sandoval's office patient's colon cancer is 

responding to chemotherapy and has not spread to any other organ except liver.


- Received 5 FU chemo at [place].


- Has follow up with Dr. Sandoval on July 30





Fibromyalgia


- Controlled


- Duloxetine 60 mg PO HS





DVT Prophylaxis


- Lovenox 40 mg SC daily - hold today's dose as per pain management for OR 

tomorrow








<Martha Melton - Last Filed: 07/30/18 08:26>





Objective





- Vital Signs/Intake and Output


Vital Signs (last 24 hours): 


 











Temp Pulse Resp BP Pulse Ox


 


 98.2 F   90   19   124/85   96 


 


 07/30/18 07:53  07/30/18 07:53  07/30/18 07:53  07/30/18 07:53  07/30/18 07:53











- Medications


Medications: 


 Current Medications





Amlodipine Besylate (Norvasc)  10 mg PO [facility]


   Last Admin: 07/29/18 21:26 Dose:  10 mg


Dextrose (Dextrose 50% Inj)  0 ml IV STAT PRN; Protocol


   PRN Reason: Hypoglycemia Protocol


Dextrose (Glutose 15)  0 gm PO ONCE PRN; Protocol


   PRN Reason: Hypoglycemia Protocol


Docusate Sodium (Colace)  100 mg PO BID [facility]


   Last Admin: 07/30/18 08:08 Dose:  100 mg


Duloxetine HCl (Cymbalta)  60 mg PO [facility]


   Last Admin: 07/29/18 21:27 Dose:  60 mg


Enoxaparin Sodium (Lovenox)  40 mg SC DAILY [facility]


   PRN Reason: Protocol


   Last Admin: 07/28/18 08:56 Dose:  40 mg


Fentanyl (Duragesic)  1 patch TD Q3D [facility]


   PRN Reason: Protocol


   Last Admin: 07/27/18 15:13 Dose:  1 patch


Glucagon (Glucagen Diagnostic Kit)  0 mg IM STAT PRN; Protocol


   PRN Reason: Hypoglycemia Protocol


Hydromorphone HCl (Dilaudid)  1.5 mg IVP Q3 PRN


   PRN Reason: Pain, severe (8-10)


   Last Admin: 07/30/18 08:17 Dose:  1.5 mg


Insulin Human Lispro (Humalog)  0 units SC ACHS [facility]


   PRN Reason: Protocol


   Last Admin: 07/30/18 08:09 Dose:  2 units


Ondansetron HCl (Zofran Inj)  4 mg IVP Q6 PRN


   PRN Reason: Nausea/Vomiting


   Last Admin: 07/29/18 21:26 Dose:  4 mg


Pantoprazole Sodium (Protonix Susp)  40 mg PO DAILY [facility]


   Last Admin: 07/30/18 08:09 Dose:  40 mg


Simethicone (Mylicon Chew Tab)  80 mg PO [facility] PRN


   PRN Reason: Flatulence


   Last Admin: 07/28/18 17:04 Dose:  80 mg


Tramadol HCl (Ultram)  50 mg PO Q6 PRN


   PRN Reason: Pain, moderate (4-7)


   Last Admin: 07/29/18 10:13 Dose:  50 mg











- Labs


Labs: 


 





 07/27/18 15:05 





 07/28/18 05:20 





 











PT  14.5 Seconds (9.8-13.1)  H  07/28/18  10:05    


 


INR  1.3  (0.9-1.2)  H  07/28/18  10:05    


 


APTT  32.5 Seconds (25.6-37.1)   07/28/18  10:05    














Attending/Attestation





- Attestation


I have personally seen and examined this patient.: Yes


I have fully participated in the care of the patient.: Yes


I have reviewed all pertinent clinical information, including history, physical 

exam and plan: Yes


Notes (Text): 





07/30/18 08:24


Attending Note -ATTESTATION. Patient appears in less distress this AM. Still 

reports pain low back present. For intervention by pain consultant Dr. Winn 

Monday 7/30/18.

## 2018-07-30 PROCEDURE — 3E0T3BZ INTRODUCTION OF ANESTHETIC AGENT INTO PERIPHERAL NERVES AND PLEXI, PERCUTANEOUS APPROACH: ICD-10-PCS | Performed by: ANESTHESIOLOGY

## 2018-07-30 RX ADMIN — INSULIN LISPRO SCH UNITS: 100 INJECTION, SOLUTION INTRAVENOUS; SUBCUTANEOUS at 08:09

## 2018-07-30 RX ADMIN — POLYETHYLENE GLYCOL 3350 SCH GM: 17 POWDER, FOR SOLUTION ORAL at 17:29

## 2018-07-30 RX ADMIN — INSULIN LISPRO SCH: 100 INJECTION, SOLUTION INTRAVENOUS; SUBCUTANEOUS at 12:27

## 2018-07-30 RX ADMIN — INSULIN LISPRO SCH: 100 INJECTION, SOLUTION INTRAVENOUS; SUBCUTANEOUS at 22:00

## 2018-07-30 RX ADMIN — PANTOPRAZOLE SODIUM SCH MG: 40 GRANULE, DELAYED RELEASE ORAL at 08:09

## 2018-07-30 RX ADMIN — INSULIN LISPRO SCH UNITS: 100 INJECTION, SOLUTION INTRAVENOUS; SUBCUTANEOUS at 17:28

## 2018-07-30 NOTE — RAD
Date of service: 



07/30/2018



PROCEDURE:  Lumbar epidural steroid injection



HISTORY:

PAIN MANAGEMENT



COMPARISON:

None



TECHNIQUE:

Standard protocol for this study/examination.



FINDINGS:

 Total fluoroscopic time (continuous mode) utilized during the 

procedure 140.8 (seconds). Total exam DLP: 94.68 (mGy) 



IMPRESSION:

Less than 1 hr fluoroscopic time utilized during performance of the 

procedure.

## 2018-07-30 NOTE — CP.PCM.PN
Subjective





- Date & Time of Evaluation


Date of Evaluation: 07/30/18


Time of Evaluation: 07:30





- Subjective


Subjective: 


Patient evaluated and examined at bedside in AM. NAD. Improvement in back pain 

but still complains of upper abdominal pain radiating from back. Patient 

currently on Dilaudid 1.5 mg Q3hr. Patient is also constipated and mildly 

nauseous. Last BM on Thursday. Denies any lower abdominal pain, fever, chills, 

CP, SOB, headache, dizziness, diarrhea or dysuria. Back injection today in OR.








Objective





- Vital Signs/Intake and Output


Vital Signs (last 24 hours): 


 











Temp Pulse Resp BP Pulse Ox


 


 98.2 F   90   19   124/85   96 


 


 07/30/18 07:53  07/30/18 07:53  07/30/18 07:53  07/30/18 07:53  07/30/18 07:53











- Medications


Medications: 


 Current Medications





Amlodipine Besylate (Norvasc)  10 mg PO HS [facility]


   Last Admin: 07/29/18 21:26 Dose:  10 mg


Dextrose (Dextrose 50% Inj)  0 ml IV STAT PRN; Protocol


   PRN Reason: Hypoglycemia Protocol


Dextrose (Glutose 15)  0 gm PO ONCE PRN; Protocol


   PRN Reason: Hypoglycemia Protocol


Docusate Sodium (Colace)  100 mg PO BID [facility]


   Last Admin: 07/29/18 17:28 Dose:  100 mg


Duloxetine HCl (Cymbalta)  60 mg PO HS [facility]


   Last Admin: 07/29/18 21:27 Dose:  60 mg


Enoxaparin Sodium (Lovenox)  40 mg SC DAILY [facility]


   PRN Reason: Protocol


   Last Admin: 07/28/18 08:56 Dose:  40 mg


Fentanyl (Duragesic)  1 patch TD Q3D [facility]


   PRN Reason: Protocol


   Last Admin: 07/27/18 15:13 Dose:  1 patch


Glucagon (Glucagen Diagnostic Kit)  0 mg IM STAT PRN; Protocol


   PRN Reason: Hypoglycemia Protocol


Hydromorphone HCl (Dilaudid)  1.5 mg IVP Q3 PRN


   PRN Reason: Pain, severe (8-10)


   Last Admin: 07/30/18 04:51 Dose:  1.5 mg


Insulin Human Lispro (Humalog)  0 units SC ACHS [facility]


   PRN Reason: Protocol


   Last Admin: 07/29/18 22:00 Dose:  Not Given


Ondansetron HCl (Zofran Inj)  4 mg IVP Q6 PRN


   PRN Reason: Nausea/Vomiting


   Last Admin: 07/29/18 21:26 Dose:  4 mg


Pantoprazole Sodium (Protonix Susp)  40 mg PO DAILY [facility]


   Last Admin: 07/29/18 10:04 Dose:  40 mg


Simethicone (Mylicon Chew Tab)  80 mg PO [facility] PRN


   PRN Reason: Flatulence


   Last Admin: 07/28/18 17:04 Dose:  80 mg


Tramadol HCl (Ultram)  50 mg PO Q6 PRN


   PRN Reason: Pain, moderate (4-7)


   Last Admin: 07/29/18 10:13 Dose:  50 mg











- Labs


Labs: 


 





 07/27/18 15:05 





 07/28/18 05:20 





 











PT  14.5 Seconds (9.8-13.1)  H  07/28/18  10:05    


 


INR  1.3  (0.9-1.2)  H  07/28/18  10:05    


 


APTT  32.5 Seconds (25.6-37.1)   07/28/18  10:05    














- Constitutional


Appears: Well, Non-toxic, No Acute Distress





- Head Exam


Head Exam: ATRAUMATIC, NORMAL INSPECTION, NORMOCEPHALIC





- Eye Exam


Eye Exam: EOMI, Normal appearance, PERRL


Pupil Exam: NORMAL ACCOMODATION, PERRL





- ENT Exam


ENT Exam: Mucous Membranes Moist





- Respiratory Exam


Respiratory Exam: NORMAL BREATHING PATTERN





- Cardiovascular Exam


Cardiovascular Exam: REGULAR RHYTHM, +S1, +S2.  absent: Murmur





- GI/Abdominal Exam


GI & Abdominal Exam: Tenderness, Hypoactive Bowel Sounds.  absent: Distended





- Rectal Exam


Rectal Exam: Deferred





- Extremities Exam


Extremities Exam: absent: Calf Tenderness, Tenderness





- Back Exam


Back Exam: paraspinal tenderness, tenderness.  absent: CVA tenderness (L), CVA 

tenderness (R)





- Neurological Exam


Neurological Exam: Alert, Awake, Oriented x3





- Psychiatric Exam


Psychiatric exam: Normal Affect, Normal Mood





- Skin


Skin Exam: Dry, Intact, Normal Color





Assessment and Plan





- Assessment and Plan (Free Text)


Assessment: 


47 y/o woman w/ pmh of HTN, chronic back pain, Colon cancer w/ metastasis to 

liver, Depression, Fibromyalgia, GERD presents to ED w/ intractable back pain 

and failed outpatient management of back pain.


Plan: 


Lower thoracic back pain


- Chronic, H/O multiple lumbar surgeries. 


- LS Spine AP/LAT: L4-5 disc spacer.  Otherwise, unremarkable radiographs of 

the lumbar spine.


- CT Chest with contrast: Unremarkable contrast enhanced CT of the chest. No 

suspicious pulmonary nodules, masses or infiltrates.  No significant findings 

within the mediastinum. Incompletely visualized hepatic metastatic disease.


- As per Pain Management: continue Fentanyl patch, increase Dilaudid to 1.5mg 

q3h IV PRN


- hold Lovenox morning AM


- Coag profile: PT 14.5, INR 1.3, aPTT 32.5


- Injection Today, celiac plexus vs epidural injection





Nausea and vomiting


- Improving


- afebrile, non-tachycardic, BP stable


- Zofran 4 mg Q6h IV PRN





Constipation


- Last BM 7/26


- Currently on ensure and liquid diet


- Mostly secondary to Dilaudid


- C/W Colace 100 mg BID


- Start Sennakot-s 2 tab nightly


- Start miralex daily


- C/W Simethicone.





NIDDM


- Uncontrolled


- Last A1c 7.7


- Pt denies taking home meds for DM


- Insulin lispro correction scale 


- Hypoglycemia protocol  


- Accucheck ACHS





HTN


- BP stable


- Amlodipine 10 mg QD


- continue to monitor 





Colon cancer w/metastasis to liver


- CT abdomen/pelvis consistent with liver metastasis, unchanged from previous 

CT on 7/11


- Thoracic/lumbar MRI in 3/2018 didn't reveal any metastatic diseases or 

significant stenosis.


- According to Dr. Hilton Sandoval's office patient's colon cancer is 

responding to chemotherapy and has not spread to any other organ except liver.


- Received 5 FU chemo at [place].


- Has follow up with Dr. Sandoval on July 30





Fibromyalgia


- Controlled


- Duloxetine 60 mg PO HS





DVT Prophylaxis


- Lovenox 40 mg SC daily - hold today's dose as per pain management for OR 

tomorrow

## 2018-07-30 NOTE — OP
PROCEDURE DATE:  07/30/2018



PREOPERATIVE DIAGNOSIS:  Abdominal pain.



POSTOPERATIVE DIAGNOSIS:  Abdominal pain.



PROCEDURE:  Bilateral celiac plexus block.



SURGEON:  Sonido Winn MD.



ANESTHESIOLOGIST:  Dr. Mendoza.



TYPE OF ANESTHESIA:  Monitored anesthesia care.



COMPLICATIONS:  None.



SPECIMEN:  None.



DESCRIPTION OF PROCEDURE:  As follows:  After we had discussion of the

procedure with the patient including its risks, benefits, alternatives,

outcome data, possibility of no effect or increased pain, the patient

consented to the procedure.  She denied any recent infection, bleeding

tendencies or being on anticoagulants; a decision was then made to proceed

to the OR.



The patient was placed on a fluoroscopy table in a prone position with two

pillows underneath her abdomen.  The back was prepped and draped in the

usual sterile fashion.  A sterile technique was adhered during the entire

procedure.  The L1 vertebral levels were first identified in the

anteroposterior view.  The target is at the space anterior to L1 vertebral

body where the celiac plexus ganglion was precise.  The procedure was first

performed on the right side by turning the fluoroscopy towards the right at

approximately 15 degrees.  The safety triangle is at the area surrounded by

the transverse process inferiorly and the anterior border of the L1

vertebral lateral body laterally and the superior endplate of the L1

vertebral body superiorly.  The skin overlying this target was infiltrated

with 1% lidocaine using 25-gauge needle.  Subsequently, a 22-gauge 5 inch

spinal needle was incrementally advanced under fluoroscopic guidance until

the tip of the needle made bony contact within the safety triangle with the

vertebral body at the L1.  Then the fluoroscopy was turned towards the

lateral position and the needle advancement was guided under lateral

fluoroscopy view until tip of the needle was at approximately 4 cm anterior

to the anterior border of the vertebral body.  On the anteroposterior view,

the needle tip was at the medial border of the L1 pedicle on the right

side.  After satisfactory positioning of the needle, the same procedure was

repeated on the left side slightly less oblique angle at approximately 10

degrees oblique towards the left.  The technique was repeated and the tip

of the needle on the left side was also inserted at approximately 4 cm

anterior to the anterior border of the L1 vertebral body.  After

satisfactory positioning of both needles, the _____ was removed.  There was

no signs of blood, CSF or air in the hub of the needle.  Then approximately

1 mL of Isovue contrast was injected into each needle to rule out any

inadvertent bowel or vascular puncturing.  There was appropriate spread of

the contrast without any signs of intravenous placement.  At this point,

approximately 10 mL of 0.25% Marcaine and Decadron mixture was gradually

injected.  The patient tolerated the procedure well.  At the end of the

procedure, the needles were removed and the patient's back was cleaned and

dry bandages were applied.



The patient was then transferred to the recovery area in good conditions

without any signs of CNS toxicity or any neurological deficits.  She will

be followed in our office in approximately 2 to 4 weeks.





__________________________________________

En-Berhane Winn MD

 



DD:  07/30/2018 10:29:08

DT:  07/30/2018 13:05:44

Job # [id number]

## 2018-07-31 VITALS
DIASTOLIC BLOOD PRESSURE: 74 MMHG | HEART RATE: 92 BPM | RESPIRATION RATE: 19 BRPM | SYSTOLIC BLOOD PRESSURE: 112 MMHG | TEMPERATURE: 97.7 F | OXYGEN SATURATION: 97 %

## 2018-07-31 LAB
ALBUMIN SERPL-MCNC: 4.5 G/DL (ref 3.5–5)
ALBUMIN/GLOB SERPL: 1.1 {RATIO} (ref 1–2.1)
ALT SERPL-CCNC: 61 U/L (ref 9–52)
AST SERPL-CCNC: 67 U/L (ref 14–36)
BUN SERPL-MCNC: 9 MG/DL (ref 7–17)
CALCIUM SERPL-MCNC: 10.3 MG/DL (ref 8.4–10.2)
ERYTHROCYTE [DISTWIDTH] IN BLOOD BY AUTOMATED COUNT: 14.5 % (ref 11.5–14.5)
GFR NON-AFRICAN AMERICAN: > 60
HGB BLD-MCNC: 13.3 G/DL (ref 12–16)
MCH RBC QN AUTO: 28.8 PG (ref 27–31)
MCHC RBC AUTO-ENTMCNC: 33.2 G/DL (ref 33–37)
MCV RBC AUTO: 86.7 FL (ref 81–99)
PLATELET # BLD: 293 K/UL (ref 130–400)
RBC # BLD AUTO: 4.64 MIL/UL (ref 3.8–5.2)
WBC # BLD AUTO: 13.9 K/UL (ref 4.8–10.8)

## 2018-07-31 RX ADMIN — PANTOPRAZOLE SODIUM SCH MG: 40 GRANULE, DELAYED RELEASE ORAL at 08:43

## 2018-07-31 RX ADMIN — INSULIN LISPRO SCH: 100 INJECTION, SOLUTION INTRAVENOUS; SUBCUTANEOUS at 12:54

## 2018-07-31 RX ADMIN — POLYETHYLENE GLYCOL 3350 SCH GM: 17 POWDER, FOR SOLUTION ORAL at 08:41

## 2018-07-31 RX ADMIN — INSULIN LISPRO SCH: 100 INJECTION, SOLUTION INTRAVENOUS; SUBCUTANEOUS at 06:52

## 2018-07-31 RX ADMIN — ENOXAPARIN SODIUM SCH MG: 40 INJECTION SUBCUTANEOUS at 08:43

## 2018-07-31 NOTE — CP.PCM.PN
Subjective





- Date & Time of Evaluation


Date of Evaluation: 07/31/18


Time of Evaluation: 09:00





- Subjective


Subjective: 


Patient is s/p celiac plexus block yesterday.  She reports improved abdominal 

pain, but still has residual discomfort in RUQ and RLQ.  She's agreeable to 

discharge home today, home regimen was explained to her and she expresses 

understanding.





She denies SOB, hematuria, or neurological deficits.





Objective





- Vital Signs/Intake and Output


Vital Signs (last 24 hours): 


 











Temp Pulse Resp BP Pulse Ox


 


 97.7 F   92 H  19   112/74   97 


 


 07/31/18 07:45  07/31/18 07:45  07/31/18 07:45  07/31/18 07:45  07/31/18 07:45











- Medications


Medications: 


 Current Medications





Amlodipine Besylate (Norvasc)  10 mg PO HS [facility]


   Last Admin: 07/30/18 21:27 Dose:  10 mg


Dextrose (Dextrose 50% Inj)  0 ml IV STAT PRN; Protocol


   PRN Reason: Hypoglycemia Protocol


Dextrose (Glutose 15)  0 gm PO ONCE PRN; Protocol


   PRN Reason: Hypoglycemia Protocol


Docusate Sodium (Colace)  100 mg PO BID [facility]


   Last Admin: 07/31/18 08:43 Dose:  100 mg


Duloxetine HCl (Cymbalta)  60 mg PO HS [facility]


   Last Admin: 07/30/18 21:27 Dose:  60 mg


Enoxaparin Sodium (Lovenox)  40 mg SC DAILY [facility]


   PRN Reason: Protocol


   Last Admin: 07/31/18 08:43 Dose:  40 mg


Fentanyl (Duragesic)  1 patch TD Q3D [facility]


   PRN Reason: Protocol


   Last Admin: 07/30/18 15:00 Dose:  1 patch


Glucagon (Glucagen Diagnostic Kit)  0 mg IM STAT PRN; Protocol


   PRN Reason: Hypoglycemia Protocol


Hydromorphone HCl (Dilaudid)  1.5 mg IVP Q3 PRN


   PRN Reason: Pain, severe (8-10)


   Last Admin: 07/31/18 06:22 Dose:  1.5 mg


Lactated Ringer's (Lactated Ringer's)  1,000 mls @ 100 mls/hr IV .Q10H [facility]


Lactated Ringer's (Lactated Ringer's)  1,000 mls @ 125 mls/hr IV .Q8H [facility]


   Last Admin: 07/30/18 17:29 Dose:  Not Given


Insulin Human Lispro (Humalog)  0 units SC ACHS [facility]


   PRN Reason: Protocol


   Last Admin: 07/31/18 06:52 Dose:  Not Given


Ondansetron HCl (Zofran Inj)  4 mg IVP Q6 PRN


   PRN Reason: Nausea/Vomiting


   Last Admin: 07/29/18 21:26 Dose:  4 mg


Pantoprazole Sodium (Protonix Susp)  40 mg PO DAILY [facility]


   Last Admin: 07/31/18 08:43 Dose:  40 mg


Polyethylene Glycol (Miralax)  17 gm PO DAILY [facility]


   Last Admin: 07/31/18 08:41 Dose:  17 gm


Senna/Docusate Sodium (Senokot S 50 Mg-8.6 Mg)  2 tab PO HS [facility]


   Last Admin: 07/30/18 21:28 Dose:  2 tab


Simethicone (Mylicon Chew Tab)  80 mg PO [facility] PRN


   PRN Reason: Flatulence


   Last Admin: 07/28/18 17:04 Dose:  80 mg


Tramadol HCl (Ultram)  50 mg PO Q6 PRN


   PRN Reason: Pain, moderate (4-7)


   Last Admin: 07/31/18 08:44 Dose:  50 mg











- Labs


Labs: 


 





 07/31/18 08:25 





 07/31/18 08:25 





 











PT  14.5 Seconds (9.8-13.1)  H  07/28/18  10:05    


 


INR  1.3  (0.9-1.2)  H  07/28/18  10:05    


 


APTT  32.5 Seconds (25.6-37.1)   07/28/18  10:05    














Assessment and Plan


(1) Intractable low back pain


Assessment & Plan: 


49 yo woman w/ metastatic colon CA to liver, with abdominal pain s/p celiac 

plexus block.





- Fentanyl patch script left in chart


- patient has Tramadol and Dilaudid for home regimen


- f/u as outpatient


Status: Acute

## 2018-07-31 NOTE — CP.PCM.DIS
Provider





- Provider


Date of Admission: 


07/27/18 14:53





Attending physician: 


Dayana Langley MD





Time Spent in preparation of Discharge (in minutes): 15





Diagnosis





- Discharge Diagnosis


(1) Intractable back pain


Status: Acute   





(2) Nausea and vomiting


Status: Resolved   





Hospital Course





- Lab Results


Lab Results: 


 Most Recent Lab Values











WBC  13.9 K/uL (4.8-10.8)  H D 07/31/18  08:25    


 


RBC  4.64 Mil/uL (3.80-5.20)   07/31/18  08:25    


 


Hgb  13.3 g/dL (12.0-16.0)  D 07/31/18  08:25    


 


Hct  40.2 % (34.0-47.0)   07/31/18  08:25    


 


MCV  86.7 fl (81.0-99.0)   07/31/18  08:25    


 


MCH  28.8 pg (27.0-31.0)   07/31/18  08:25    


 


MCHC  33.2 g/dL (33.0-37.0)   07/31/18  08:25    


 


RDW  14.5 % (11.5-14.5)   07/31/18  08:25    


 


Plt Count  293 K/uL (130-400)   07/31/18  08:25    


 


MPV  8.7 fl (7.2-11.7)   07/27/18  15:05    


 


Neut % (Auto)  75.1 % (50.0-75.0)  H  07/27/18  15:05    


 


Lymph % (Auto)  13.0 % (20.0-40.0)  L  07/27/18  15:05    


 


Mono % (Auto)  10.3 % (0.0-10.0)  H  07/27/18  15:05    


 


Eos % (Auto)  1.3 % (0.0-4.0)   07/27/18  15:05    


 


Baso % (Auto)  0.3 % (0.0-2.0)   07/27/18  15:05    


 


Neut # (Auto)  6.5 K/uL (1.8-7.0)   07/27/18  15:05    


 


Lymph # (Auto)  1.1 K/uL (1.0-4.3)   07/27/18  15:05    


 


Mono # (Auto)  0.9 K/uL (0.0-0.8)  H  07/27/18  15:05    


 


Eos # (Auto)  0.1 K/uL (0.0-0.7)   07/27/18  15:05    


 


Baso # (Auto)  0.0 K/uL (0.0-0.2)   07/27/18  15:05    


 


PT  14.5 Seconds (9.8-13.1)  H  07/28/18  10:05    


 


INR  1.3  (0.9-1.2)  H  07/28/18  10:05    


 


APTT  32.5 Seconds (25.6-37.1)   07/28/18  10:05    


 


Sodium  133 mmol/l (132-148)   07/31/18  08:25    


 


Potassium  4.9 MMOL/L (3.6-5.0)   07/31/18  08:25    


 


Chloride  89 mmol/L ()  L  07/31/18  08:25    


 


Carbon Dioxide  32 mmol/L (22-30)  H  07/31/18  08:25    


 


Anion Gap  17  (10-20)   07/31/18  08:25    


 


BUN  9 mg/dl (7-17)   07/31/18  08:25    


 


Creatinine  0.4 mg/dl (0.7-1.2)  L  07/31/18  08:25    


 


Est GFR ( Amer)  > 60   07/31/18  08:25    


 


Est GFR (Non-Af Amer)  > 60   07/31/18  08:25    


 


POC Glucose (mg/dL)  140 mg/dL ()  H  07/31/18  10:43    


 


Random Glucose  139 mg/dL ()  H  07/31/18  08:25    


 


Calcium  10.3 mg/dL (8.4-10.2)  H  07/31/18  08:25    


 


Total Bilirubin  0.9 mg/dl (0.2-1.3)   07/31/18  08:25    


 


AST  67 U/L (14-36)  H  07/31/18  08:25    


 


ALT  61 U/L (9-52)  H  07/31/18  08:25    


 


Alkaline Phosphatase  147 U/L ()  H  07/31/18  08:25    


 


Total Protein  8.7 G/DL (6.3-8.2)  H  07/31/18  08:25    


 


Albumin  4.5 g/dL (3.5-5.0)   07/31/18  08:25    


 


Globulin  4.3 gm/dL (2.2-3.9)  H  07/31/18  08:25    


 


Albumin/Globulin Ratio  1.1  (1.0-2.1)   07/31/18  08:25    














- Hospital Course


Hospital Course: 


49 y/o woman w/ pmh of HTN, chronic back pain, Colon cancer w/ metastasis to 

liver, Depression, Fibromyalgia, GERD presents to ED with persistent back pain, 

nausea and vomiting. Pain management(Dr. Winn) on board. Patient is S/P celiac 

plexus block for back pain. Patient reports better pain control and feeling 

better overall. Patient has been seen, examined, and deemed medically fit for 

discharge home. Patient discharged and given script for Fentanyl patch Q 72 hrs 

by pain management. Patient has Tramadol and Dilaudid for home regimen. Patient 

to follow up w/ Dr. Winn for pain management.








Discharge Medications


Fentnyl patch 50 mcg Q72 hrs x 10 patch 





Discharge Exam





- Head Exam


Head Exam: ATRAUMATIC, NORMAL INSPECTION, NORMOCEPHALIC





- Eye Exam


Eye Exam: EOMI, Normal appearance, PERRL


Pupil Exam: NORMAL ACCOMODATION, PERRL





- ENT Exam


ENT Exam: Mucous Membranes Moist





- Respiratory Exam


Respiratory Exam: Clear to PA & Lateral, UNREMARKABLE.  absent: Rales, Rhonchi, 

Wheezes, Respiratory Distress





- Cardiovascular Exam


Cardiovascular Exam: REGULAR RHYTHM, +S1, +S2





- GI/Abdominal Exam


GI & Abdominal Exam: Soft.  absent: Distended, Tenderness


Additional comments: 


BS +








- Back Exam


Back exam: absent: CVA tenderness (L), CVA tenderness (R), tenderness





- Neurological Exam


Neurological exam: Alert, Oriented x3





- Psychiatric Exam


Psychiatric exam: Normal Affect, Normal Mood





- Skin


Skin Exam: Dry, Intact, Normal Color





Discharge Plan





- Follow Up Plan


Condition: FAIR


Disposition: HOME/ ROUTINE


Patient education suggested?: Yes


Instructions:  Chronic Pain (DC), Managing Pain When You Have Cancer, Fentanyl


Additional Instructions: 


follow up with primary md 1 week 





Referrals: 


Sonido Winn MD [Staff Provider] - 


Carlin Kaur MD [Family Provider] -

## 2018-08-07 ENCOUNTER — HOSPITAL ENCOUNTER (OUTPATIENT)
Dept: HOSPITAL 14 - H.ER | Age: 48
Setting detail: OBSERVATION
LOS: 2 days | Discharge: HOME | End: 2018-08-09
Attending: FAMILY MEDICINE | Admitting: FAMILY MEDICINE
Payer: MEDICAID

## 2018-08-07 VITALS — BODY MASS INDEX: 31.9 KG/M2

## 2018-08-07 DIAGNOSIS — Z88.0: ICD-10-CM

## 2018-08-07 DIAGNOSIS — K59.00: ICD-10-CM

## 2018-08-07 DIAGNOSIS — Z88.1: ICD-10-CM

## 2018-08-07 DIAGNOSIS — E11.9: ICD-10-CM

## 2018-08-07 DIAGNOSIS — M79.7: ICD-10-CM

## 2018-08-07 DIAGNOSIS — G89.3: Primary | ICD-10-CM

## 2018-08-07 DIAGNOSIS — C78.7: ICD-10-CM

## 2018-08-07 DIAGNOSIS — K21.9: ICD-10-CM

## 2018-08-07 DIAGNOSIS — Z85.038: ICD-10-CM

## 2018-08-07 DIAGNOSIS — I10: ICD-10-CM

## 2018-08-07 DIAGNOSIS — F32.9: ICD-10-CM

## 2018-08-07 DIAGNOSIS — F41.9: ICD-10-CM

## 2018-08-07 LAB
APTT BLD: 28.8 SECONDS (ref 25.6–37.1)
APTT BLD: 33.2 SECONDS (ref 25.6–37.1)
BUN SERPL-MCNC: 8 MG/DL (ref 7–17)
CALCIUM SERPL-MCNC: 9.7 MG/DL (ref 8.4–10.2)
ERYTHROCYTE [DISTWIDTH] IN BLOOD BY AUTOMATED COUNT: 14.5 % (ref 11.5–14.5)
GFR NON-AFRICAN AMERICAN: > 60
HGB BLD-MCNC: 13.1 G/DL (ref 12–16)
INR PPP: 1
INR PPP: 1.3
MCH RBC QN AUTO: 29.1 PG (ref 27–31)
MCHC RBC AUTO-ENTMCNC: 33.8 G/DL (ref 33–37)
MCV RBC AUTO: 86.1 FL (ref 81–99)
PLATELET # BLD: 260 K/UL (ref 130–400)
PROTHROMBIN TIME: 11.1 SECONDS (ref 9.8–13.1)
PROTHROMBIN TIME: 14.5 SECONDS (ref 9.8–13.1)
RBC # BLD AUTO: 4.5 MIL/UL (ref 3.8–5.2)
WBC # BLD AUTO: 12.9 K/UL (ref 4.8–10.8)

## 2018-08-07 PROCEDURE — 80048 BASIC METABOLIC PNL TOTAL CA: CPT

## 2018-08-07 PROCEDURE — 81025 URINE PREGNANCY TEST: CPT

## 2018-08-07 PROCEDURE — 85025 COMPLETE CBC W/AUTO DIFF WBC: CPT

## 2018-08-07 PROCEDURE — 96376 TX/PRO/DX INJ SAME DRUG ADON: CPT

## 2018-08-07 PROCEDURE — 74022 RADEX COMPL AQT ABD SERIES: CPT

## 2018-08-07 PROCEDURE — 85610 PROTHROMBIN TIME: CPT

## 2018-08-07 PROCEDURE — 36415 COLL VENOUS BLD VENIPUNCTURE: CPT

## 2018-08-07 PROCEDURE — 96375 TX/PRO/DX INJ NEW DRUG ADDON: CPT

## 2018-08-07 PROCEDURE — 99283 EMERGENCY DEPT VISIT LOW MDM: CPT

## 2018-08-07 PROCEDURE — 64530 N BLOCK INJ CELIAC PELUS: CPT

## 2018-08-07 PROCEDURE — 83690 ASSAY OF LIPASE: CPT

## 2018-08-07 PROCEDURE — 82948 REAGENT STRIP/BLOOD GLUCOSE: CPT

## 2018-08-07 PROCEDURE — 85730 THROMBOPLASTIN TIME PARTIAL: CPT

## 2018-08-07 PROCEDURE — 85027 COMPLETE CBC AUTOMATED: CPT

## 2018-08-07 PROCEDURE — 96374 THER/PROPH/DIAG INJ IV PUSH: CPT

## 2018-08-07 PROCEDURE — 81003 URINALYSIS AUTO W/O SCOPE: CPT

## 2018-08-07 PROCEDURE — 96361 HYDRATE IV INFUSION ADD-ON: CPT

## 2018-08-07 PROCEDURE — 96372 THER/PROPH/DIAG INJ SC/IM: CPT

## 2018-08-08 VITALS — HEART RATE: 86 BPM

## 2018-08-08 LAB
BACTERIA #/AREA URNS HPF: (no result) /[HPF]
BASOPHILS # BLD AUTO: 0 K/UL (ref 0–0.2)
BASOPHILS NFR BLD: 0.2 % (ref 0–2)
BILIRUB UR-MCNC: NEGATIVE MG/DL
COLOR UR: (no result)
EOSINOPHIL # BLD AUTO: 0.1 K/UL (ref 0–0.7)
EOSINOPHIL NFR BLD: 1.1 % (ref 0–4)
EOSINOPHIL NFR BLD: 2 % (ref 0–7)
ERYTHROCYTE [DISTWIDTH] IN BLOOD BY AUTOMATED COUNT: 14.3 % (ref 11.5–14.5)
GLUCOSE UR STRIP-MCNC: (no result) MG/DL
HGB BLD-MCNC: 12.3 G/DL (ref 12–16)
LEUKOCYTE ESTERASE UR-ACNC: (no result) LEU/UL
LYMPHOCYTE: 12 % (ref 20–50)
LYMPHOCYTES # BLD AUTO: 0.6 K/UL (ref 1–4.3)
LYMPHOCYTES NFR BLD AUTO: 6.6 % (ref 20–40)
MCH RBC QN AUTO: 28.9 PG (ref 27–31)
MCHC RBC AUTO-ENTMCNC: 33.3 G/DL (ref 33–37)
MCV RBC AUTO: 86.9 FL (ref 81–99)
MONOCYTE: 7 % (ref 0–10)
MONOCYTES # BLD: 1.1 K/UL (ref 0–0.8)
MONOCYTES NFR BLD: 11.7 % (ref 0–10)
NEUTROPHILS # BLD: 7.7 K/UL (ref 1.8–7)
NEUTROPHILS NFR BLD AUTO: 78 % (ref 42–75)
NEUTROPHILS NFR BLD AUTO: 80.4 % (ref 50–75)
NEUTS BAND NFR BLD: 1 % (ref 0–2)
NRBC BLD AUTO-RTO: 0 % (ref 0–0)
PH UR STRIP: 7 [PH] (ref 5–8)
PLATELET # BLD EST: NORMAL 10*3/UL
PLATELET # BLD: 197 K/UL (ref 130–400)
PMV BLD AUTO: 9.5 FL (ref 7.2–11.7)
PROT UR STRIP-MCNC: NEGATIVE MG/DL
RBC # BLD AUTO: 4.26 MIL/UL (ref 3.8–5.2)
RBC # UR STRIP: NEGATIVE /UL
RBC MORPH BLD: NORMAL
SP GR UR STRIP: 1 (ref 1–1.03)
SQUAMOUS EPITHIAL: < 1 /HPF (ref 0–5)
TOTAL CELLS COUNTED BLD: 100
URINE CLARITY: (no result)
UROBILINOGEN UR-MCNC: (no result) MG/DL (ref 0.2–1)
WBC # BLD AUTO: 9.6 K/UL (ref 4.8–10.8)

## 2018-08-08 RX ADMIN — PANTOPRAZOLE SODIUM SCH MG: 20 TABLET, DELAYED RELEASE ORAL at 12:38

## 2018-08-08 RX ADMIN — POLYETHYLENE GLYCOL 3350 SCH GM: 17 POWDER, FOR SOLUTION ORAL at 12:38

## 2018-08-08 RX ADMIN — POLYETHYLENE GLYCOL 3350 SCH GM: 17 POWDER, FOR SOLUTION ORAL at 16:58

## 2018-08-08 RX ADMIN — ENOXAPARIN SODIUM SCH MG: 40 INJECTION SUBCUTANEOUS at 12:41

## 2018-08-08 NOTE — CP.PCM.PN
Subjective





- Date & Time of Evaluation


Date of Evaluation: 08/08/18


Time of Evaluation: 07:30





- Subjective


Subjective: 





Patient seen and examined this morning, NAD, stable overnight. C/o abdominal 

pain which moves around and lower back pain, patient is s/p Celiac block and 

Sacroiliac joint inj in last admission. patient reports decreased appetite but 

tolerating PO intake. denies any f/c/n/v/d, chest pain, dizziness, palpitations 

or LEs weakness. 





Objective





- Vital Signs/Intake and Output


Vital Signs (last 24 hours): 


 











Temp Pulse Resp BP Pulse Ox


 


 98.1 F   96 H  18   139/82   97 


 


 08/08/18 03:23  08/08/18 03:58  08/08/18 03:58  08/08/18 03:23  08/08/18 05:01











- Medications


Medications: 


 Current Medications





Acetaminophen (Tylenol 325mg Tab)  650 mg PO Q4 PRN


   PRN Reason: Pain, Mild (1-3)


Amlodipine Besylate (Norvasc)  10 mg PO DAILY [facility]


Bisacodyl (Dulcolax)  5 mg PO BID PRN


   PRN Reason: Constipation


Dextrose (Dextrose 50% Inj)  0 ml IV STAT PRN; Protocol


   PRN Reason: Hypoglycemia Protocol


Dextrose (Glutose 15)  0 gm PO ONCE PRN; Protocol


   PRN Reason: Hypoglycemia Protocol


Duloxetine HCl (Cymbalta)  60 mg PO HS [facility]


   Last Admin: 08/08/18 02:03 Dose:  60 mg


Enoxaparin Sodium (Lovenox)  40 mg SC DAILY [facility]


   PRN Reason: Protocol


Fentanyl (Duragesic)  1 patch TD Q3D [facility]


   PRN Reason: Protocol


Glucagon (Glucagen Diagnostic Kit)  0 mg IM STAT PRN; Protocol


   PRN Reason: Hypoglycemia Protocol


Hydromorphone HCl (Dilaudid)  1.5 mg IVP Q4 PRN


   PRN Reason: Pain, severe (8-10)


   Last Admin: 08/08/18 05:48 Dose:  1.5 mg


Insulin Human Regular (Humulin R)  0 units SC ACHS [facility]


   PRN Reason: Protocol


Magnesium Hydroxide (Milk Of Magnesia)  30 ml PO DAILY PRN


   PRN Reason: Constipation


Methylprednisolone (Medrol)  20 mg PO ONCE ONE


   Stop: 08/08/18 09:01


Methylprednisolone (Medrol)  16 mg PO ONCE ONE


   Stop: 08/09/18 09:01


Methylprednisolone (Medrol)  12 mg PO ONCE ONE


   Stop: 08/10/18 09:01


Methylprednisolone (Medrol)  8 mg PO ONCE ONE


   Stop: 08/11/18 09:01


Methylprednisolone (Medrol)  4 mg PO ONCE ONE


   Stop: 08/12/18 09:01


Ondansetron HCl (Zofran Inj)  4 mg IVP Q6 PRN


   PRN Reason: Nausea/Vomiting


Pantoprazole Sodium (Protonix Ec Tab)  40 mg PO DAILY [facility]


Pantoprazole Sodium (Protonix Ec Tab)  20 mg PO DAILY [facility]


Polyethylene Glycol (Miralax)  17 gm PO BID [facility]


Tramadol HCl (Ultram)  50 mg PO Q6 PRN


   PRN Reason: Pain, moderate (4-7)


   Last Admin: 08/08/18 04:34 Dose:  50 mg











- Labs


Labs: 


 





 08/07/18 22:14 





 08/07/18 22:14 





 











PT  14.5 Seconds (9.8-13.1)  H  08/07/18  22:14    


 


INR  1.3   08/07/18  22:14    


 


APTT  28.8 Seconds (25.6-37.1)   08/07/18  22:14    














- Constitutional


Appears: No Acute Distress





- Head Exam


Head Exam: NORMAL INSPECTION





- Eye Exam


Eye Exam: EOMI, Normal appearance, PERRL


Pupil Exam: NORMAL ACCOMODATION





- ENT Exam


ENT Exam: Mucous Membranes Moist





- Neck Exam


Neck Exam: Normal Inspection





- Respiratory Exam


Respiratory Exam: Clear to Ausculation Bilateral, NORMAL BREATHING PATTERN.  

absent: Accessory Muscle Use, Chest Wall Tenderness, Decreased Breath Sounds, 

Prolonged Expiratory Phase, Rhonchi, Wheezes, Respiratory Distress, Stridor





- Cardiovascular Exam


Cardiovascular Exam: REGULAR RHYTHM, +S1, +S2





- GI/Abdominal Exam


GI & Abdominal Exam: Soft, Tenderness (MUQ), Normal Bowel Sounds.  absent: 

Distended, Hernia, Organomegaly, Pulsatile Mass, Rebound





- Extremities Exam


Extremities Exam: Full ROM, Normal Capillary Refill, Normal Inspection.  absent

: Pedal Edema, Tenderness





- Back Exam


Back Exam: absent: CVA tenderness (L), CVA tenderness (R), muscle spasm, 

paraspinal tenderness, rash noted, tenderness, vertebral tenderness





- Neurological Exam


Neurological Exam: Alert, Awake, CN II-XII Intact, Normal Gait, Oriented x3


Neuro motor strength exam: Left Upper Extremity: 4, Right Upper Extremity: 4, 

Left Lower Extremity: 4, Right Lower Extremity: 4





- Psychiatric Exam


Psychiatric exam: Normal Affect, Normal Mood





- Skin


Skin Exam: Dry, Intact, Normal Color





Assessment and Plan





- Assessment and Plan (Free Text)


Assessment: 





A/P: 


49 Y/O female with PMH of HTN, chronic back pain, Colon cancer w/ metastasis to 

liver, Depression, Fibromyalgia, GERD admitted for evaluation and treatment of 

abdominal and back pain, s/p Celiac block and Sacroiliac joint inj in last 

admission 1 week ago.





Chronic Abdominal and back pain, H/O multiple lumbar surgeries, s/p Celiac 

block and Sacroiliac joint inj in last admission 1 week ago. 


- Abd x ray: Constipation, no OBS, mild right lung opacity


- Dr Winn consulted for pain management: (Recos' possible Celiac Nerve block and 

duragesic as outpatient)


- C/w pain management: Dulaudid 1.5 mg Q4hr PRN  for pain, Fentanyl patch Q3 





NIDDM


- Uncontrolled not on medication


- Last A1c 7.7 on 5/28/18


- Low dose sliding scale





HTN


- Amlodipine 10 mg QD





Fibromyalgia


- Controlled


- Duloxetine 60 mg PO HS





DVT Prophylaxis


- Lovenox 40 mg SC daily

## 2018-08-08 NOTE — CP.PCM.HP
History of Present Illness





- History of Present Illness


History of Present Illness: 





49 Y/O female with PMHx of HTN, chronic back pain, Colon cancer w/ metastasis 

to liver, Depression, Fibromyalgia, GERD presents to ED abdominal and back 

pain. Rose Marie was discharged a little over a week ago for simlar symptoms.  

Patient s/p bilateral sacroiliac joint injections for back pain on 7/21.  

celiac plexus block 7/30/18. Patient was prescrived dilaudid 2 mg PO Q8h by 

pain management and script for tramadol 50 mg PO Q6h by medical team.  She was 

also prescribed a fentanyl patch, but was not able to get it because her 

insurance did not cover it. 


- Patient goes to [facility] for her chemo sessions. She went yesterday for her 

Chemo session however she was in so much pain that they ended up keeping her 

for observation and treated her pain instead of doing the chemo. 


- Patient describes her pain as sharp starting from epigastric region and 

radiating to her back. 10/10 severity before she was given pain medication in 

the ER. Patient has some nausea but denies any vomiting, diarrhea or bloody 

stools.





ED Course


- CMP, CBC (with differential): WBC : 12.9


- Dilaudid 1 mg IVP in ER


- Zofran Inj


- Abd x ray


- NS 1,000ml IV per 1,000mls/hr


-Zofran 4mg IVP








PCP: [facility]-Oncologist: Dr. Hilton Sandoval at [facility]


Pain management: Dr. Winn


PMH: chronic back pain, fibromyalgia, HTN, GERD, colon cancer, anxiety


PSH: C6 fusion, Colon resection 2015  and b/l oopherectomy, chemoembolization 

of liver.


SocialHx: denies ETOH/tobacco/drug abuse


FamilyHx: DM, HTN


Meds: as per med rec


Hospitalization: multiple visits for simlar complaints , most recently 

discharged 1 week ago








Present on Admission





- Present on Admission


Any Indicators Present on Admission: Yes


History of Uncontrolled Diabetes: Yes





Past Patient History





- Infectious Disease


Hx of Infectious Diseases: None





- Tetanus Immunizations


Tetanus Immunization: Unknown





- Past Medical History & Family History


Past Medical History?: Yes





- Past Social History


Smoking Status: Never Smoked





- CARDIAC


Hx Atrial Fibrillation: No


Hx Cardia Arrhythmia: No


Hx Congestive Heart Failure: No


Hx Hypercholesterolemia: No


Hx Hypertension: Yes


Hx Mitral Valve Prolapse: No


Hx Peripheral Edema: No





- PULMONARY


Hx Asthma: No


Hx Bronchitis: No


Hx Chronic Obstructive Pulmonary Disease (COPD): No


Hx Emphysema: No


Hx Pneumonia: No


Hx Pulmonary Embolism: No


Hx Sleep Apnea: No





- NEUROLOGICAL


Hx Alzheimer's Disease: No


Hx Dementia: No


Hx Migraine: No


Hx Multiple Sclerosis: No


Hx Parkinson's Disease: No


Hx Seizures: No


Hx Transient Ischemic Attacks (TIA): No





- HEENT


Hx HEENT Problems: No





- RENAL


Hx Chronic Kidney Disease: No


Hx Kidney Stones: No





- ENDOCRINE/METABOLIC


Hx Hyperthyroidism: No


Hx Hypothyroidism: No





- HEMATOLOGICAL/ONCOLOGICAL


Hx Anemia: No


Hx Human Immunodeficiency Virus (HIV): No


Hx Sickle Cell Disease: No





- INTEGUMENTARY


Hx Dermatological Problems: No





- MUSCULOSKELETAL/RHEUMATOLOGICAL


Hx Arthritis: No


Hx Osteoporosis: No


Hx Rheumatoid Arthritis: No





- GASTROINTESTINAL


Hx Crohn's Disease: No


Hx Diverticulitis: No


Hx Gall Bladder Disease: No


Hx Pancreatitis: No





- GENITOURINARY/GYNECOLOGICAL


Hx Sexually Transmitted Disorders: No





- PSYCHIATRIC


Hx Anxiety: Yes


Hx Bipolar Disorder: No


Hx Depression: Yes


Hx Post Traumatic Stress Disorder: No


Hx Schizophrenia: No





- SURGICAL HISTORY


Hx Appendectomy: No


Hx Carotid Endarterectomy: No


Hx Cholecystectomy: No


Hx Coronary Artery Bypass Graft: No


Hx Coronary Stent: No


Hx Tonsillectomy: No





- ANESTHESIA


Hx Anesthesia: Yes


Hx Anesthesia Reactions: No


Hx Malignant Hyperthermia: No





Meds


Allergies/Adverse Reactions: 


 Allergies











Allergy/AdvReac Type Severity Reaction Status Date / Time


 


penicillin G Allergy  RASH Verified 09/27/17 11:19


 


vancomycin Allergy  RASH Verified 09/27/17 11:19














Physical Exam





- Constitutional


Additional comments: 





uncomfortable





- Head Exam


Head Exam: NORMAL INSPECTION





- Eye Exam


Eye Exam: Normal appearance





- ENT Exam


ENT Exam: Mucous Membranes Moist, Normal Exam





- Respiratory Exam


Respiratory Exam: Clear to Auscultation Bilateral, NORMAL BREATHING PATTERN.  

absent: Rhonchi, Wheezes





- Cardiovascular Exam


Cardiovascular Exam: REGULAR RHYTHM, +S1, +S2





- GI/Abdominal Exam


GI & Abdominal Exam: Normal Bowel Sounds, Soft, Tenderness (epigastric 

tenderness, right upper quadrant , left upper quadrant and left lower quadrant 

tendernes).  absent: Guarding





- Extremities Exam


Extremities exam: Positive for: normal inspection.  Negative for: calf 

tenderness





- Back Exam


Back exam: paraspinal tenderness





- Neurological Exam


Neurological exam: Alert, CN II-XII Intact, Oriented x3





- Psychiatric Exam


Psychiatric exam: Normal Affect, Normal Mood





- Skin


Skin Exam: Dry, Normal Color, Warm





Results





- Vital Signs


Recent Vital Signs: 





 Last Vital Signs











Temp  98 F   08/07/18 21:09


 


Pulse  107 H  08/07/18 21:09


 


Resp  22   08/07/18 21:09


 


BP  134/109 H  08/07/18 21:09


 


Pulse Ox  97   08/08/18 00:27














- Labs


Result Diagrams: 


 08/07/18 22:14





 08/07/18 22:14


Labs: 





 Laboratory Results - last 24 hr











  08/07/18 08/07/18 08/07/18





  21:46 21:46 21:46


 


WBC   Np 


 


RBC   Np 


 


Hgb   Np 


 


Hct   Np 


 


MCV   Np 


 


MCH   Np 


 


MCHC   Np 


 


RDW   Np 


 


Plt Count   Np 


 


PT    11.1


 


INR    1.0


 


APTT    33.2


 


Sodium  Np  


 


Potassium  Np  


 


Chloride  Np  


 


Carbon Dioxide  Np  


 


Anion Gap  Np  


 


BUN  Np  


 


Creatinine  Np  


 


Est GFR ( Amer)  Np  


 


Est GFR (Non-Af Amer)  Np  


 


Random Glucose  Np  


 


Calcium  Np  














  08/07/18 08/07/18 08/07/18





  22:14 22:14 22:14


 


WBC    12.9 H


 


RBC    4.50


 


Hgb    13.1


 


Hct    38.8


 


MCV    86.1


 


MCH    29.1


 


MCHC    33.8


 


RDW    14.5


 


Plt Count    260


 


PT  14.5 H  


 


INR  1.3  


 


APTT  28.8  


 


Sodium   133 


 


Potassium   3.7 


 


Chloride   96 L 


 


Carbon Dioxide   25 


 


Anion Gap   16 


 


BUN   8 


 


Creatinine   0.4 L 


 


Est GFR ( Amer)   > 60 


 


Est GFR (Non-Af Amer)   > 60 


 


Random Glucose   217 H 


 


Calcium   9.7 














Assessment & Plan





- Assessment and Plan (Free Text)


Assessment: 





49 Y/O female with PMHx of HTN, chronic back pain, Colon cancer w/ metastasis 

to liver, Depression, Fibromyalgia, GERD presents to ED with abdominal and back 

pain.





1) Chronic Abdominal and back pain


- Chronic, H/O multiple lumbar surgeries. 


- Dilaudid 1 mg IVP in ED


- Dulaudid 1.5 mg Q4hr PRN  for pain


- Fentanyl patch Q3


- Abd x ray: f/u with results


- Dr Winn consulted for pain managmet


- Abd CT 7/19/18: Show hepatic metastatic disease consistent with history of 

colonic








2) NIDDM


- Uncontrolled not on medication


- Last A1c 7.7 on 5/28/18


- Low dose sliding scale





3.) HTN


- Amlodipine 10 mg QD





4) Fibromyalgia


- Controlled


- Duloxetine 60 mg PO HS





DVT Prophylaxis


- Lovenox 40 mg SC daily

## 2018-08-08 NOTE — RAD
Date of service: 



08/07/2018



PROCEDURE:  Radiographs of the chest and abdomen (obstructive series)



HISTORY:

abd pain, back pain, hx of colon CA  



COMPARISON:

3/2/2018



TECHNIQUE:

AP radiograph of the chest, with upright and supine radiographs of 

the abdomen.



FINDINGS:



CHEST:

Lungs: Interval mostly linear configured opacity mid right lung zone 

-an interval change. Atelectasis, early infiltrate as well as either 

these 2 with small interval pulmonary nodules in this patient with 

clinical history of colon cancer are all considerations. Two 

clarified, consider noncontrast CT chest imaging



Cardiovascular: Normal size heart. No pulmonary vascular congestion. 

Right-sided Port-A-Cath tip superior vena cava-similar 



Pleura: No pleural fluid. No pneumothorax.



Other findings: Partially visualize cervical fusion plate



ABDOMEN AND PELVIS:

Bowel: Moderate stool retention right colon. Left abdominal surgical 

clips



Free air: None.



Bones:  No acute fracture. Five radiopaque disc spacer with left 

lateral surgical clips



Other findings: Right hemipelvic phleboliths.



IMPRESSION:

Interval nonspecific opacity mainly linear configured in mid right 

lung -considerations as above



Moderate to extensive stool retention. No bowel obstruction.  No free 

air 



Other findings as above.

## 2018-08-08 NOTE — ED PDOC
HPI: Abdomen


Time Seen by Provider: 08/07/18 21:18


Chief Complaint (Nursing): Abdominal Pain


Chief Complaint (Provider): Abdominal Pain


History Per: Patient


History/Exam Limitations: no limitations


Onset/Duration Of Symptoms: Persistent (x5 weeks)


Current Symptoms Are (Timing): Still Present


Additional Complaint(s): 


48 year old female with pmHx of colon cancer, presents with severe abdominal 

pain radiating to back associated with nausea ongoing for 5 weeks. Patient 

states that pain changes location and has been worse since chemo embolization.  

Also states she was unable to complete 2 rounds of chemo. She reports no 

improvement with PO dilaudid. She denies any fever, visual changes or change in 

bowel movements. 





PMD: Dr. Carlin Kaur





Past Medical History


Reviewed: Historical Data, Nursing Documentation, Vital Signs


Vital Signs: 


 Last Vital Signs











Temp  98.1 F   08/08/18 03:23


 


Pulse  96 H  08/08/18 03:58


 


Resp  18   08/08/18 03:58


 


BP  139/82   08/08/18 03:23


 


Pulse Ox  98   08/08/18 03:58














- Medical History


PMH: Anxiety, Back Problems, Depression, Diabetes, Fibromyalgia, GERD, HTN, 

Malignancy


   Denies: [place]'s Disease, Alzheimer's Disease, Anemia, Arthritis, Asthma, 

Atrial Fibrillation, Benign Prostatic Hyperplasia, Bipolar Disorder, Bronchitis

, Cardia Arrhythmia, Cardiac Aneurysm, CHF, Colonic Polyps, COPD, Crohn's 

Disease, Cushing's Syndrome, Dementia, Diverticulitis, Deep Vein Thrombosis, 

Emphysema, Gastrointestinal Ulcer, Gall Bladder Disease, Graves' Disease, 

Hiatal Hernia, HIV, Hypercholesterolemia, Hyperlipidemia, Hyperthyroidism, 

Hypothyroidism, Kidney Stones, Migraine, Mitral Valve Prolapse, Multiple 

Sclerosis, Obstructive Bowel, Osteoporosis, Pancreatitis, Parkinson's Disease, 

Pericarditis, Peripheral Edema, Personality Disorder, Pneumonia, Pneumothorax, 

Post Traumatic Stress Disorder, Pulmonary Embolism, End Stage Renal Disease, 

Chronic Kidney Disease, Rheumatoid Arthritis, Schizophrenia, Seizures, Sickle 

Cell Disease, Sexually Transmitted Disease, Sleep Apnea, TIA





- Surgical History


Surgical History: Back Surgery, Endoscopy


   Denies: Appendectomy, CABG, Carotid Endarterectomy, Cholecystectomy, 

Coronary Stent, Tonsillectomy





- Family History


Family History: States: Unknown Family Hx





- Immunization History


Hx Influenza Vaccination: Yes


Hx Pneumococcal Vaccination: No





- Home Medications


Home Medications: 


 Ambulatory Orders











 Medication  Instructions  Recorded


 


amLODIPine [Norvasc] 10 mg PO DAILY #0 tab 10/12/15


 


DULoxetine [Cymbalta] 60 mg PO HS 10/30/15


 


HYDROmorphone [Dilaudid] 2 mg PO Q8 PRN 07/27/18


 


traMADol [Ultram] 50 mg PO Q6 PRN  tab 07/31/18


 


Methylprednisolone 1 tab PO ASDIR 08/08/18





[Methylprednisolone]  


 


Omeprazole Magnesium [Prilosec Otc] 20 mg PO DAILY 08/08/18














- Allergies


Allergies/Adverse Reactions: 


 Allergies











Allergy/AdvReac Type Severity Reaction Status Date / Time


 


penicillin G Allergy  RASH Verified 09/27/17 11:19


 


vancomycin Allergy  RASH Verified 09/27/17 11:19














Review of Systems


ROS Statement: Except As Marked, All Systems Reviewed And Found Negative


Constitutional: Negative for: Fever


Gastrointestinal: Positive for: Nausea, Abdominal Pain.  Negative for: Vomiting

, Diarrhea, Constipation


Musculoskeletal: Positive for: Back Pain





Physical Exam





- Reviewed


Nursing Documentation Reviewed: Yes


Vital Signs Reviewed: Yes





- Physical Exam


Appears: Positive for: Uncomfortable (crying in pain)


Head Exam: Positive for: ATRAUMATIC, NORMAL INSPECTION, NORMOCEPHALIC


Skin: Positive for: Normal Color


Eye Exam: Positive for: Normal appearance


ENT: Positive for: Normal ENT Inspection


Neck: Positive for: Normal


Cardiovascular/Chest: Positive for: Regular Rate, Rhythm


Respiratory: Positive for: Normal Breath Sounds.  Negative for: Respiratory 

Distress


Gastrointestinal/Abdominal: Positive for: Soft, Tenderness (periumbilical and 

LLQ), Other (multiple surgical scars)


Back: Positive for: Normal Inspection.  Negative for: L CVA Tenderness, R CVA 

Tenderness


Extremity: Positive for: Normal ROM (upper/lower)


Neurologic/Psych: Positive for: Alert, Oriented





- Laboratory Results


Result Diagrams: 


 08/07/18 22:14





 08/07/18 22:14





- ECG


O2 Sat by Pulse Oximetry: 97 (RA)


Pulse Ox Interpretation: Normal





Medical Decision Making


Medical Decision Making: 


A/P: 48 year old female, hx of colon cancer, presents with intractable 

abdominal and back pain. Patient presents with current pain consistent with 

prior pain due to cancer. Low suspicion for diverticulitis vs. colitis vs. SBO, 

given history. 





Initial Plan:


* Labs


* XR obstructive series


* Dilaudid 1mg IVP


* NS 1,000ml IV per 1,000mls/hr


* Zofran 4mg IVP


* UA





Time: 2324


--Patient will be admitted for intractable abdominal pain.  Xray abdomen shows 

no air-fluid levels.





--------------------------------------------------------------------------------

-----------------


Scribe Attestation:


Documented by Cat Partida, acting as a scribe for Gelacio Law MD.





Provider Scribe Attestation:


All medical record entries made by the Scribe were at my direction and 

personally dictated by me. I have reviewed the chart and agree that the record 

accurately reflects my personal performance of the history, physical exam, 

medical decision making, and the department course for this patient. I have 

also personally directed, reviewed, and agree with the discharge instructions 

and disposition.





Disposition





- Clinical Impression


Clinical Impression: 


 Intractable back pain, Intractable abdominal pain








- Patient ED Disposition


Is Patient to be Admitted: No





- Disposition


Disposition Time: 23:24


Condition: FAIR

## 2018-08-08 NOTE — RAD
Date of service: 



08/08/2018



PROCEDURE:  Intraoperative Fluoroscopy. 



HISTORY:

PAIN MANAGEMENT



FINDINGS:

Fluoroscopic assistance was provided.  Fluoroscopy time = 90.1 

seconds.  Radiation dose = 56.57 mGy. 



Please refer to the operative report from Dr. Winn for additional 

details.

## 2018-08-08 NOTE — CP.PCM.PN
Subjective





- Date & Time of Evaluation


Date of Evaluation: 08/08/18


Time of Evaluation: 10:45





- Subjective


Subjective: 


Patient was readmitted yesterday for intractable abdominal pain.  She wasn't 

able to receive the scheduled chemotherapy at [facility] earlier this week due 

to the pain.  After discharge last week, she wasn't able to get Fentanyl patch 

as an outpatient.  Steps have been taken for her to receive it, but insurance 

needs to approve it.  





She returns today with diffuse abdominal pain, refractory to Dilaudid 2mg PO, 

and Tramadol 50mg PO.  





Objective





- Vital Signs/Intake and Output


Vital Signs (last 24 hours): 


 











Temp Pulse Resp BP Pulse Ox


 


 98.5 F   102 H  20   132/88   98 


 


 08/08/18 08:47  08/08/18 08:47  08/08/18 08:47  08/08/18 08:47  08/08/18 08:47








Intake and Output: 


 











 08/08/18 08/08/18





 06:59 18:59


 


Intake Total  100


 


Balance  100














- Medications


Medications: 


 Current Medications





Acetaminophen (Tylenol 325mg Tab)  650 mg PO Q4 PRN


   PRN Reason: Pain, Mild (1-3)


Amlodipine Besylate (Norvasc)  10 mg PO DAILY [facility]


   Last Admin: 08/08/18 08:47 Dose:  10 mg


Bisacodyl (Dulcolax)  5 mg PO BID PRN


   PRN Reason: Constipation


Dextrose (Dextrose 50% Inj)  0 ml IV STAT PRN; Protocol


   PRN Reason: Hypoglycemia Protocol


Dextrose (Glutose 15)  0 gm PO ONCE PRN; Protocol


   PRN Reason: Hypoglycemia Protocol


Duloxetine HCl (Cymbalta)  60 mg PO HS [facility]


   Last Admin: 08/08/18 02:03 Dose:  60 mg


Enoxaparin Sodium (Lovenox)  40 mg SC DAILY JAM


   PRN Reason: Protocol


Fentanyl (Duragesic)  1 patch TD Q3D JAM


   PRN Reason: Protocol


   Last Admin: 08/08/18 08:45 Dose:  1 patch


Glucagon (Glucagen Diagnostic Kit)  0 mg IM STAT PRN; Protocol


   PRN Reason: Hypoglycemia Protocol


Hydromorphone HCl (Dilaudid)  1.5 mg IVP Q4 PRN


   PRN Reason: Pain, severe (8-10)


   Last Admin: 08/08/18 05:48 Dose:  1.5 mg


Insulin Human Regular (Humulin R)  0 units SC ACHS JAM


   PRN Reason: Protocol


   Last Admin: 08/08/18 08:46 Dose:  Not Given


Magnesium Hydroxide (Milk Of Magnesia)  30 ml PO DAILY PRN


   PRN Reason: Constipation


Methylprednisolone (Medrol)  16 mg PO ONCE ONE


   Stop: 08/09/18 09:01


Methylprednisolone (Medrol)  12 mg PO ONCE ONE


   Stop: 08/10/18 09:01


Methylprednisolone (Medrol)  8 mg PO ONCE ONE


   Stop: 08/11/18 09:01


Methylprednisolone (Medrol)  4 mg PO ONCE ONE


   Stop: 08/12/18 09:01


Ondansetron HCl (Zofran Inj)  4 mg IVP Q6 PRN


   PRN Reason: Nausea/Vomiting


Pantoprazole Sodium (Protonix Ec Tab)  40 mg PO DAILY JAM


Pantoprazole Sodium (Protonix Ec Tab)  20 mg PO DAILY JAM


Polyethylene Glycol (Miralax)  17 gm PO BID JAM


Tramadol HCl (Ultram)  50 mg PO Q6 PRN


   PRN Reason: Pain, moderate (4-7)


   Last Admin: 08/08/18 04:34 Dose:  50 mg











- Labs


Labs: 


 





 08/07/18 22:14 





 08/07/18 22:14 





 











PT  14.5 Seconds (9.8-13.1)  H  08/07/18  22:14    


 


INR  1.3   08/07/18  22:14    


 


APTT  28.8 Seconds (25.6-37.1)   08/07/18  22:14    














- GI/Abdominal Exam


GI & Abdominal Exam: Soft, Tenderness





Assessment and Plan


(1) Intractable abdominal pain


Assessment & Plan: 


47 yo woman w/ colon CA with mets to liver.  Now s/p celiac plexus block #2.





- continue Duragesic 50mcg/hr q72h


- patient should be discharged with above, which she already has a script and 

insurance is reviewing it


- she will need a new script for Dilaudid 2mg, q6h PRN, which I will leave in 

chart


Status: Acute

## 2018-08-09 VITALS
OXYGEN SATURATION: 98 % | DIASTOLIC BLOOD PRESSURE: 72 MMHG | TEMPERATURE: 97.4 F | SYSTOLIC BLOOD PRESSURE: 116 MMHG | RESPIRATION RATE: 20 BRPM

## 2018-08-09 RX ADMIN — ENOXAPARIN SODIUM SCH MG: 40 INJECTION SUBCUTANEOUS at 08:38

## 2018-08-09 RX ADMIN — PANTOPRAZOLE SODIUM SCH MG: 20 TABLET, DELAYED RELEASE ORAL at 08:20

## 2018-08-09 RX ADMIN — POLYETHYLENE GLYCOL 3350 SCH GM: 17 POWDER, FOR SOLUTION ORAL at 08:39

## 2018-08-09 NOTE — OP
Copied To:  Sonido Winn MD

Attending MD:  Sonido Winn MD



PROCEDURE DATE:  08/08/18



PREOPERATIVE DIAGNOSIS:  Abdominal pain.



POSTOPERATIVE DIAGNOSIS:  Abdominal pain.



PROCEDURE:  Celiac plexus block.



SURGEON:  Sonido Winn MD.



ANESTHESIOLOGIST:  Chandler Francis MD



TYPE OF ANESTHESIA:  Monitored anesthesia care.



COMPLICATIONS:  None.



SPECIMEN:  None.



DESCRIPTION OF PROCEDURE:  As follows:  After we had discussion of the

procedure with the patient including its risks, benefits, alternatives,

outcome data, possibility of no effect or increased pain, the patient

consented to the procedure.  She denied any recent infection, bleeding

tendencies, or being on anticoagulants; a decision was then made to proceed

to the OR.



The patient was placed on a fluoroscopy table in a prone position with two

pillows underneath her abdomen.  The back was prepped and draped in the

usual sterile fashion.  A sterile technique was adhered during the entire

procedure.  The L1 vertebral levels were first identified in the

anteroposterior view.  The needle target is at the lateral border of the

vertebral body above the transverse process.  The procedure was first

performed on the right side by turning the fluoroscopy towards the right at

approximately 15 degrees.  The skin overlying the safety triangle was

infiltrated with 1% lidocaine using 25-gauge needle.  The safety triangle

consists of the transverse process of the L1 vertebral level at the

inferior border and the lateral edge of the vertebral body on the lateral

border and endplate as the superior border.  The needle was then

incrementally inserted under fluoroscopic guidance until the tip of the

needle made bony contact with vertebral body.  The needle was then worked

slightly laterally and anteriorly under lateral fluoroscopic guidance until

tip of the needle lay within above 2 cm anterior to the anterior border of

the vertebral body on the lateral fluoroscopy view.  Then the same

procedure was performed on the contralateral left side at approximately 10

degrees oblique angle, at a slightly less oblique angle so that the needle

is placed slightly anteriorly through the vertebral body without puncturing

the aorta.  After satisfactory positioning of both needles, approximately

0.5 mL of Isovue contrast was injected showing appropriate spread and

without any signs of vascular washout.  After ensuring that both needles

were not in any blood vessel, approximately 15 mL of 0.25% Marcaine and

Depo-Medrol mixture was gradually injected into each side.  At the end of

the procedure, the needle was removed and the patient's back was cleaned

and dry bandages were applied.



The patient was then transferred to the recovery area in good condition

without any signs of CNS toxicity or any neurological deficit.  She will be

following up in the office in approximately two to four weeks.





__________________________________________

En-Berhane Winn MD





DD:  08/08/2018 12:01:53

DT:  08/08/2018 15:26:36

Job # [id number]

## 2018-08-09 NOTE — CP.PCM.DIS
Provider





- Provider


Date of Admission: 


08/07/18 23:24





Attending physician: 


Dayana Langley MD





Primary care physician: 





[facility]


Consults: 





Pain management, Dr. Winn 


Time Spent in preparation of Discharge (in minutes): 40





Diagnosis





- Discharge Diagnosis


(1) Chronic abdominal pain


Status: Chronic   


Comment: History of Colon cancer w/ metastasis to liver.  s/p Celiac block and 

Sacroiliac joint inj in last admission 1 week ago   





(2) Chronic back pain


Status: Chronic   


Comment: H/O multiple lumbar surgeries   





(3) Fibromyalgia


Status: Chronic   


Comment: Duloxetine 60 mg PO HS   





(4) Diabetes


Status: Chronic   


Comment: Last A1c 7.7 on 5/28/18   





(5) Hypertension


Status: Chronic   


Comment: Amlodipine 10 mg QD   





Hospital Course





- Lab Results


Lab Results: 


 Most Recent Lab Values











WBC  9.6 K/uL (4.8-10.8)   08/08/18  13:44    


 


RBC  4.26 Mil/uL (3.80-5.20)   08/08/18  13:44    


 


Hgb  12.3 g/dL (12.0-16.0)   08/08/18  13:44    


 


Hct  37.0 % (34.0-47.0)   08/08/18  13:44    


 


MCV  86.9 fl (81.0-99.0)   08/08/18  13:44    


 


MCH  28.9 pg (27.0-31.0)   08/08/18  13:44    


 


MCHC  33.3 g/dL (33.0-37.0)   08/08/18  13:44    


 


RDW  14.3 % (11.5-14.5)   08/08/18  13:44    


 


Plt Count  197 K/uL (130-400)   08/08/18  13:44    


 


MPV  9.5 fl (7.2-11.7)   08/08/18  13:44    


 


Neut % (Auto)  80.4 % (50.0-75.0)  H  08/08/18  13:44    


 


Lymph % (Auto)  6.6 % (20.0-40.0)  L  08/08/18  13:44    


 


Mono % (Auto)  11.7 % (0.0-10.0)  H  08/08/18  13:44    


 


Eos % (Auto)  1.1 % (0.0-4.0)   08/08/18  13:44    


 


Baso % (Auto)  0.2 % (0.0-2.0)   08/08/18  13:44    


 


Neut # (Auto)  7.7 K/uL (1.8-7.0)  H  08/08/18  13:44    


 


Lymph # (Auto)  0.6 K/uL (1.0-4.3)  L  08/08/18  13:44    


 


Mono # (Auto)  1.1 K/uL (0.0-0.8)  H  08/08/18  13:44    


 


Eos # (Auto)  0.1 K/uL (0.0-0.7)   08/08/18  13:44    


 


Baso # (Auto)  0.0 K/uL (0.0-0.2)   08/08/18  13:44    


 


Neutrophils % (Manual)  78 % (42-75)  H  08/08/18  13:44    


 


Band Neutrophils %  1 % (0-2)   08/08/18  13:44    


 


Lymphocytes % (Manual)  12 % (20-50)  L  08/08/18  13:44    


 


Monocytes % (Manual)  7 % (0-10)   08/08/18  13:44    


 


Eosinophils % (Manual)  2 % (0-7)   08/08/18  13:44    


 


Platelet Estimate  Normal  (NORMAL)   08/08/18  13:44    


 


RBC Morphology  Normal  (NORMAL)   08/08/18  13:44    


 


PT  14.5 Seconds (9.8-13.1)  H  08/07/18  22:14    


 


INR  1.3   08/07/18  22:14    


 


APTT  28.8 Seconds (25.6-37.1)   08/07/18  22:14    


 


Sodium  133 mmol/l (132-148)   08/07/18  22:14    


 


Potassium  3.7 MMOL/L (3.6-5.0)   08/07/18  22:14    


 


Chloride  96 mmol/L ()  L  08/07/18  22:14    


 


Carbon Dioxide  25 mmol/L (22-30)   08/07/18  22:14    


 


Anion Gap  16  (10-20)   08/07/18  22:14    


 


BUN  8 mg/dl (7-17)   08/07/18  22:14    


 


Creatinine  0.4 mg/dl (0.7-1.2)  L  08/07/18  22:14    


 


Est GFR ( Amer)  > 60   08/07/18  22:14    


 


Est GFR (Non-Af Amer)  > 60   08/07/18  22:14    


 


POC Glucose (mg/dL)  131 mg/dL ()  H  08/09/18  05:51    


 


Random Glucose  217 mg/dL ()  H  08/07/18  22:14    


 


Calcium  9.7 mg/dL (8.4-10.2)   08/07/18  22:14    


 


Lipase  89 U/L ()   08/08/18  13:44    


 


Urine Color  Straw  (YELLOW)   08/08/18  03:55    


 


Urine Clarity  Slighty-cloudy  (Clear)   08/08/18  03:55    


 


Urine pH  7.0  (5.0-8.0)   08/08/18  03:55    


 


Ur Specific Gravity  1.005  (1.003-1.030)   08/08/18  03:55    


 


Urine Protein  Negative mg/dL (NEGATIVE)   08/08/18  03:55    


 


Urine Glucose (UA)  Neg mg/dL (Normal)   08/08/18  03:55    


 


Urine Ketones  Negative mg/dL (NEGATIVE)   08/08/18  03:55    


 


Urine Blood  Negative  (NEGATIVE)   08/08/18  03:55    


 


Urine Nitrate  Negative  (NEGATIVE)   08/08/18  03:55    


 


Urine Bilirubin  Negative  (NEGATIVE)   08/08/18  03:55    


 


Urine Urobilinogen  0.2-1.0 mg/dL (0.2-1.0)   08/08/18  03:55    


 


Ur Leukocyte Esterase  Neg Delia/uL (Negative)   08/08/18  03:55    


 


Urine RBC (Auto)  2 /hpf (0-3)   08/08/18  03:55    


 


Urine Microscopic WBC  1 /hpf (0-5)   08/08/18  03:55    


 


Ur Squamous Epith Cells  < 1 /hpf (0-5)   08/08/18  03:55    


 


Urine Bacteria  Rare  (<OCC)   08/08/18  03:55    














- Hospital Course


Hospital Course: 








49 Y/O female with PMH of HTN, chronic back pain, Colon cancer w/ metastasis to 

liver (Getting Chemo), Depression, Fibromyalgia, GERD, H/O multiple lumbar 

surgeries admitted for evaluation and treatment of acutely worsened chronic 

abdominal and back pain . During this admission, patient was evaluated by pain 

management, Dr. Winn. Patient's symptoms improved with inpatient pain management 

and repeat celiac plexus block. Patient is cleared for discharge by Dr. Winn 

whom recommended Dilaudid 2mg Q8H PO PRN #120Tabs, MS Contin 30mg PO Q8H #

15Tabs and outpatient follow up with him on 8/13/18. Patient is stable for 

discharge, and instructed to resume home medications and take new medications 

as prescribed. follow up with Dr. Winn and PMD within 1 week. ER precautions 

discussed with patient. 





- New Rx: Dilaudid 2mg Q8H PO PRN #120, MS Contin 30mg PO Q8H #15


- Home RX: Amlodipine 10mg PO daily, Diloxetine 60mg PO daily, Omeprazole 20mg 

PO daily, and Tramadol 50mg Q6H PRN








Discharge Exam





- Head Exam


Head Exam: NORMAL INSPECTION





- Eye Exam


Eye Exam: EOMI, Normal appearance, PERRL


Pupil Exam: NORMAL ACCOMODATION





- ENT Exam


ENT Exam: Mucous Membranes Moist





- Neck Exam


Neck exam: Normal Inspection





- Respiratory Exam


Respiratory Exam: Clear to PA & Lateral, NORMAL BREATHING PATTERN.  absent: 

Accessory Muscle Use, Chest Wall Tenderness, Rales, Rhonchi, Wheezes, 

Respiratory Distress





- Cardiovascular Exam


Cardiovascular Exam: REGULAR RHYTHM, RRR, +S1, +S2





- GI/Abdominal Exam


GI & Abdominal Exam: Normal Bowel Sounds, Soft.  absent: Diminished Bowel Sounds

, Distended, Guarding, Organomegaly, Rebound, Rigid, Tenderness





- Extremities Exam


Extremities exam: normal capillary refill, normal inspection, pedal pulses 

present





- Back Exam


Back exam: NORMAL INSPECTION.  absent: CVA tenderness (L), CVA tenderness (R)





- Neurological Exam


Neurological exam: Alert, CN II-XII Intact, Oriented x3, Reflexes Normal





- Psychiatric Exam


Psychiatric exam: Normal Affect, Normal Mood





- Skin


Skin Exam: Dry, Intact, Normal Color, Warm





Discharge Plan





- Discharge Medications


Prescriptions: 


HYDROmorphone [Dilaudid] 2 mg PO Q8 PRN #120 tab


 PRN Reason: Pain, Severe (8-10)


Morphine [MS Contin] 30 mg PO Q8 #15 ter





- Follow Up Plan


Condition: FAIR


Disposition: HOME/ ROUTINE


Instructions:  Chronic Pain (DC), Abdominal Pain (ED), Back Pain (GEN)


Additional Instructions: 


Follow up with Dr. Winn on Monday, 8/13/18, call for the Appt 


Follow up with Dr. Salazar on 8/14/18 as scheduled 


C/w new Rx as instructed (Dilaudid 2mg Q8H PO PRN #120, MS Contin 30mg PO Q8H #

15)  


Referrals: 


[facility] [place] [Outside]


Liv Slater MD [Family Provider] - 


Sonido Winn MD [Staff Provider] -

## 2018-08-29 ENCOUNTER — HOSPITAL ENCOUNTER (INPATIENT)
Dept: HOSPITAL 14 - H.ER | Age: 48
LOS: 3 days | Discharge: HOME | DRG: 463 | End: 2018-09-01
Attending: GENERAL ACUTE CARE HOSPITAL | Admitting: GENERAL ACUTE CARE HOSPITAL
Payer: MEDICAID

## 2018-08-29 VITALS — BODY MASS INDEX: 25 KG/M2

## 2018-08-29 DIAGNOSIS — K21.9: ICD-10-CM

## 2018-08-29 DIAGNOSIS — K65.4: ICD-10-CM

## 2018-08-29 DIAGNOSIS — G89.3: Primary | ICD-10-CM

## 2018-08-29 DIAGNOSIS — M79.7: ICD-10-CM

## 2018-08-29 DIAGNOSIS — I10: ICD-10-CM

## 2018-08-29 DIAGNOSIS — C78.7: ICD-10-CM

## 2018-08-29 DIAGNOSIS — K59.00: ICD-10-CM

## 2018-08-29 DIAGNOSIS — E11.9: ICD-10-CM

## 2018-08-29 LAB
ALBUMIN SERPL-MCNC: 4.4 G/DL (ref 3.5–5)
ALBUMIN/GLOB SERPL: 1.1 {RATIO} (ref 1–2.1)
ALT SERPL-CCNC: 35 U/L (ref 9–52)
AST SERPL-CCNC: 53 U/L (ref 14–36)
BACTERIA #/AREA URNS HPF: (no result) /[HPF]
BASOPHILS # BLD AUTO: 0 K/UL (ref 0–0.2)
BASOPHILS NFR BLD: 0.3 % (ref 0–2)
BILIRUB UR-MCNC: NEGATIVE MG/DL
BUN SERPL-MCNC: 6 MG/DL (ref 7–17)
CALCIUM SERPL-MCNC: 9.7 MG/DL (ref 8.4–10.2)
COLOR UR: YELLOW
EOSINOPHIL # BLD AUTO: 0.2 K/UL (ref 0–0.7)
EOSINOPHIL NFR BLD: 2.9 % (ref 0–4)
ERYTHROCYTE [DISTWIDTH] IN BLOOD BY AUTOMATED COUNT: 14 % (ref 11.5–14.5)
GFR NON-AFRICAN AMERICAN: > 60
GLUCOSE UR STRIP-MCNC: 150 MG/DL
HGB BLD-MCNC: 13.2 G/DL (ref 12–16)
LEUKOCYTE ESTERASE UR-ACNC: (no result) LEU/UL
LIPASE SERPL-CCNC: 47 U/L (ref 23–300)
LYMPHOCYTES # BLD AUTO: 0.8 K/UL (ref 1–4.3)
LYMPHOCYTES NFR BLD AUTO: 12.3 % (ref 20–40)
MCH RBC QN AUTO: 28.2 PG (ref 27–31)
MCHC RBC AUTO-ENTMCNC: 32.6 G/DL (ref 33–37)
MCV RBC AUTO: 86.4 FL (ref 81–99)
MONOCYTES # BLD: 0.7 K/UL (ref 0–0.8)
MONOCYTES NFR BLD: 10.5 % (ref 0–10)
NEUTROPHILS # BLD: 4.8 K/UL (ref 1.8–7)
NEUTROPHILS NFR BLD AUTO: 74 % (ref 50–75)
NRBC BLD AUTO-RTO: 0.1 % (ref 0–0)
PH UR STRIP: 5 [PH] (ref 5–8)
PLATELET # BLD: 287 K/UL (ref 130–400)
PMV BLD AUTO: 9.2 FL (ref 7.2–11.7)
PROT UR STRIP-MCNC: 30 MG/DL
RBC # BLD AUTO: 4.68 MIL/UL (ref 3.8–5.2)
RBC # UR STRIP: NEGATIVE /UL
SP GR UR STRIP: 1.02 (ref 1–1.03)
SQUAMOUS EPITHIAL: 4 /HPF (ref 0–5)
URINE AMORPHOUS SEDIMENT: (no result) /UL
URINE CLARITY: (no result)
URINE HYALINE CAST: (no result) /HPF (ref 0–2)
UROBILINOGEN UR-MCNC: 4 MG/DL (ref 0.2–1)
WBC # BLD AUTO: 6.5 K/UL (ref 4.8–10.8)

## 2018-08-29 RX ADMIN — INSULIN LISPRO SCH: 100 INJECTION, SOLUTION INTRAVENOUS; SUBCUTANEOUS at 22:24

## 2018-08-29 NOTE — ED PDOC
HPI: Abdomen


Time Seen by Provider: 08/29/18 13:40


Chief Complaint (Nursing): Abdominal Pain


Chief Complaint (Provider): abd pain


History Per: Patient (47 y/o female h/o metastatic colon ca to ovary/liver here 

with persistent ongoing abd pain associated with vomiting and constipation.  

Patient has last had chemo-embolization 7/5/2018 of liver but states since that 

procedure pain dramatically worse in severity.  Denies any fevers/chills.)





Past Medical History


Reviewed: Historical Data, Nursing Documentation, Vital Signs


Vital Signs: 


 Last Vital Signs











Temp  98.6 F   08/30/18 07:47


 


Pulse  102 H  08/30/18 08:32


 


Resp  18   08/30/18 07:47


 


BP  141/92 H  08/30/18 08:32


 


Pulse Ox  99   08/30/18 07:47














- Medical History


PMH: Anxiety, Back Problems, Depression, Diabetes, Fibromyalgia, GERD, HTN, 

Malignancy


   Denies: Lew[place]'s Disease, Alzheimer's Disease, Anemia, Arthritis, Asthma, 

Atrial Fibrillation, Benign Prostatic Hyperplasia, Bipolar Disorder, Bronchitis

, Cardia Arrhythmia, Cardiac Aneurysm, CHF, Colonic Polyps, COPD, Crohn's 

Disease, Cushing's Syndrome, Dementia, Diverticulitis, Deep Vein Thrombosis, 

Emphysema, Gastrointestinal Ulcer, Gall Bladder Disease, Graves' Disease, 

Hiatal Hernia, HIV, Hypercholesterolemia, Hyperlipidemia, Hyperthyroidism, 

Hypothyroidism, Kidney Stones, Migraine, Mitral Valve Prolapse, Multiple 

Sclerosis, Obstructive Bowel, Osteoporosis, Pancreatitis, Parkinson's Disease, 

Pericarditis, Peripheral Edema, Personality Disorder, Pneumonia, Pneumothorax, 

Post Traumatic Stress Disorder, Pulmonary Embolism, End Stage Renal Disease, 

Chronic Kidney Disease, Rheumatoid Arthritis, Schizophrenia, Seizures, Sickle 

Cell Disease, Sexually Transmitted Disease, Sleep Apnea, TIA





- Surgical History


Surgical History: Back Surgery, Endoscopy


   Denies: Appendectomy, CABG, Carotid Endarterectomy, Cholecystectomy, 

Coronary Stent, Tonsillectomy





- Family History


Family History: States: Unknown Family Hx





- Immunization History


Hx Influenza Vaccination: Yes


Hx Pneumococcal Vaccination: No





- Home Medications


Home Medications: 


 Ambulatory Orders











 Medication  Instructions  Recorded


 


amLODIPine [Norvasc] 10 mg PO DAILY #0 tab 10/12/15


 


DULoxetine [Cymbalta] 60 mg PO HS 10/30/15


 


Omeprazole Magnesium [Prilosec Otc] 40 mg PO DAILY 08/08/18


 


HYDROmorphone [Dilaudid] 2 mg PO Q8 PRN #120 tab 08/09/18


 


Morphine [MS Contin] 30 mg PO Q8 #15 ter 08/09/18


 


Gabapentin [Neurontin] 300 mg PO Q8 08/29/18


 


metFORMIN [glucOPHAGE] 500 mg PO DAILY 08/29/18














- Allergies


Allergies/Adverse Reactions: 


 Allergies











Allergy/AdvReac Type Severity Reaction Status Date / Time


 


penicillin G Allergy  RASH Verified 08/29/18 13:26


 


vancomycin Allergy  RASH Verified 08/29/18 13:26














Review of Systems


ROS Statement: Except As Marked, All Systems Reviewed And Found Negative





Physical Exam





- Reviewed


Nursing Documentation Reviewed: Yes


Vital Signs Reviewed: Yes





- Physical Exam


Appears: Positive for: Well, Non-toxic, No Acute Distress


Head Exam: Positive for: ATRAUMATIC, NORMAL INSPECTION, NORMOCEPHALIC


Skin: Positive for: Normal Color, Warm, DRY


Eye Exam: Positive for: EOMI, Normal appearance, PERRL


ENT: Positive for: Normal ENT Inspection


Neck: Positive for: Normal, Painless ROM


Cardiovascular/Chest: Positive for: Regular Rate, Rhythm


Respiratory: Positive for: CNT, Normal Breath Sounds


Gastrointestinal/Abdominal: Positive for: Normal Exam, Soft, Tenderness (

generalized. active BS)


Back: Positive for: Normal Inspection


Extremity: Positive for: Normal ROM


Neurologic/Psych: Positive for: Alert, Oriented





- Laboratory Results


Result Diagrams: 


 08/30/18 06:05





 08/30/18 06:05





- ECG


O2 Sat by Pulse Oximetry: 99





- Progress


ED Course And Treament: 








CT


IMPRESSION:


Heterogeneous diminished attenuation in the posterior right hepatic lobe 

consistent with known colonic mass metastatic disease and unchanged in 

appearance compared to the prior examination. Mild retroperitoneal 

lymphadenopathy.  Nonspecific mesenteric panniculitis.  Trace right pleural 

effusion.





DILAUDID 1.5 MG IV X 1 DOSE





REGLAN 10MG IV X 1 DOSE





NS 1 LITER 500ML PER HOUR





DILAUDID 1.5MG IV X 2ND DOSE





D/W FAMILY MED RESIDENT





Disposition





- Clinical Impression


Clinical Impression: 


 Intractable abdominal pain








- Patient ED Disposition


Is Patient to be Admitted: Yes





- Disposition


Disposition Time: 18:24


Condition: FAIR





- Pt Status Changed To:


Hospital Disposition Of: Inpatient





- Admit Certification


Admit to Inpatient:: After my assessment, the patient will require 

hospitalization for at least two midnights.  This is because of the severity of 

symptoms shown, intensity of services needed, and/or the medical risk in this 

patient being treated as an outpatient.

## 2018-08-29 NOTE — CP.PCM.HP
History of Present Illness





- History of Present Illness


History of Present Illness: 





47 yo ,f, PMhx/o of HTN, chronic back pain, Colon cancer w/ metastasis to liver

, Depression, Fibromyalgia, GERD presents to ED c/o LLQ  abdominal pain started 

yesterday associated with nausea and 4 episodes of nonbloddy nonbillious  

vomiting, partially alleviated with Dilaudid 1.5 mg PO  Q4h. She also reports 

constipation for 7 days. Last BM today small amount. Denies fever, cough, SOB,

chest pain, rectal bleeding, pedal edema, dysuria. Pt has hx/o chronic back 

pain. .  Patient s/p bilateral sacroiliac joint injections for back pain on 7/ 21.  celiac plexus block 7/30/18. Patient has been seen by  Heme-Oncologist: 

Dr. Hilton Sandoval at [facility]. Patient will have next appt 9/5/ 18. 





PCP: [facility]


Heme-Oncologist: Dr. Hilton Sandoval at [facility]


Pain management: Dr. Winn


PMH: chronic back pain, fibromyalgia, HTN, GERD, colon cancer, anxiety


PSH: C6 fusion, Colon resection 2015  and b/l oopherectomy, chemoembolization 

of liver.


SocialHx: denies ETOH/tobacco/drug abuse


FamilyHx: DM, HTN


Meds: as per med rec








ED course: 


Vs: normal


Labs: CBc, CMP nl, UA nl.


CT abd: Heterogeneous diminished attenuation in the posterior right hepatic 

lobe consistent with known colonic mass metastatic disease and unchanged in 

appearance compared to the prior examination. Mild retroperitoneal 

lymphadenopathy.  Nonspecific mesenteric panniculitis.  Trace right pleural 

effusion.


Med: Dilaudid 1.5 mg IV, Reglan 10 mg IV. Iv fluids  ml 











Present on Admission





- Present on Admission


Any Indicators Present on Admission: No


History of DVT/PE: No


History of Uncontrolled Diabetes: No


Urinary Catheter: No


Decubitus Ulcer Present: No





Review of Systems





- Review of Systems


All systems: reviewed and no additional remarkable complaints except





- Gastrointestinal


Gastrointestinal: Abdominal Pain, Nausea, Vomiting





Past Patient History





- Infectious Disease


Hx of Infectious Diseases: None





- Tetanus Immunizations


Tetanus Immunization: Unknown





- Past Medical History & Family History


Past Medical History?: Yes





- Past Social History


Smoking Status: Never Smoked





- CARDIAC


Hx Atrial Fibrillation: No


Hx Cardia Arrhythmia: No


Hx Congestive Heart Failure: No


Hx Hypercholesterolemia: No


Hx Hypertension: Yes


Hx Mitral Valve Prolapse: No


Hx Peripheral Edema: No





- PULMONARY


Hx Asthma: No


Hx Bronchitis: No


Hx Chronic Obstructive Pulmonary Disease (COPD): No


Hx Emphysema: No


Hx Pneumonia: No


Hx Pulmonary Embolism: No


Hx Sleep Apnea: No





- NEUROLOGICAL


Hx Alzheimer's Disease: No


Hx Dementia: No


Hx Migraine: No


Hx Multiple Sclerosis: No


Hx Parkinson's Disease: No


Hx Seizures: No


Hx Transient Ischemic Attacks (TIA): No





- HEENT


Hx HEENT Problems: No





- RENAL


Hx Chronic Kidney Disease: No


Hx Kidney Stones: No





- ENDOCRINE/METABOLIC


Hx Hyperthyroidism: No


Hx Hypothyroidism: No





- HEMATOLOGICAL/ONCOLOGICAL


Hx Anemia: No


Hx Human Immunodeficiency Virus (HIV): No


Hx Sickle Cell Disease: No





- INTEGUMENTARY


Hx Dermatological Problems: No





- MUSCULOSKELETAL/RHEUMATOLOGICAL


Hx Arthritis: No


Hx Osteoporosis: No


Hx Rheumatoid Arthritis: No





- GASTROINTESTINAL


Hx Crohn's Disease: No


Hx Diverticulitis: No


Hx Gall Bladder Disease: No


Hx Pancreatitis: No





- GENITOURINARY/GYNECOLOGICAL


Hx Sexually Transmitted Disorders: No





- PSYCHIATRIC


Hx Anxiety: Yes


Hx Bipolar Disorder: No


Hx Depression: Yes


Hx Post Traumatic Stress Disorder: No


Hx Schizophrenia: No





- SURGICAL HISTORY


Hx Appendectomy: No


Hx Carotid Endarterectomy: No


Hx Cholecystectomy: No


Hx Coronary Artery Bypass Graft: No


Hx Coronary Stent: No


Hx Tonsillectomy: No





- ANESTHESIA


Hx Anesthesia: Yes


Hx Anesthesia Reactions: No


Hx Malignant Hyperthermia: No





Meds


Allergies/Adverse Reactions: 


 Allergies











Allergy/AdvReac Type Severity Reaction Status Date / Time


 


penicillin G Allergy  RASH Verified 08/29/18 13:26


 


vancomycin Allergy  RASH Verified 08/29/18 13:26














Physical Exam





- Constitutional


Appears: Non-toxic, No Acute Distress





- Head Exam


Head Exam: ATRAUMATIC, NORMOCEPHALIC





- Eye Exam


Eye Exam: Normal appearance





- ENT Exam


ENT Exam: Mucous Membranes Moist





- Respiratory Exam


Respiratory Exam: Rhonchi.  absent: Rales, Wheezes





- Cardiovascular Exam


Cardiovascular Exam: REGULAR RHYTHM, +S1, +S2





- GI/Abdominal Exam


GI & Abdominal Exam: Guarding, Normal Bowel Sounds, Soft, Tenderness (LLQ TD ).

  absent: Rebound


Additional comments: 





RUQ and epigastric old surgical scar





- Extremities Exam


Extremities exam: Positive for: normal inspection.  Negative for: pedal edema





- Back Exam


Back exam: NORMAL INSPECTION





- Neurological Exam


Neurological exam: Alert, Oriented x3





- Psychiatric Exam


Psychiatric exam: Normal Affect





- Skin


Skin Exam: Intact





Results





- Vital Signs


Recent Vital Signs: 





 Last Vital Signs











Temp  98.5 F   08/29/18 18:10


 


Pulse  100 H  08/29/18 18:10


 


Resp  16   08/29/18 18:10


 


BP  128/80   08/29/18 18:10


 


Pulse Ox  100   08/29/18 18:10














- Labs


Result Diagrams: 


 08/29/18 13:55





 08/29/18 13:55


Labs: 





 Laboratory Results - last 24 hr











  08/29/18 08/29/18 08/29/18





  13:55 13:55 14:41


 


WBC  6.5  


 


RBC  4.68  


 


Hgb  13.2  


 


Hct  40.5  


 


MCV  86.4  


 


MCH  28.2  


 


MCHC  32.6 L  


 


RDW  14.0  


 


Plt Count  287  


 


MPV  9.2  


 


Neut % (Auto)  74.0  


 


Lymph % (Auto)  12.3 L  


 


Mono % (Auto)  10.5 H  


 


Eos % (Auto)  2.9  


 


Baso % (Auto)  0.3  


 


Neut # (Auto)  4.8  


 


Lymph # (Auto)  0.8 L  


 


Mono # (Auto)  0.7  


 


Eos # (Auto)  0.2  


 


Baso # (Auto)  0.0  


 


Sodium   136 


 


Potassium   3.6 


 


Chloride   96 L 


 


Carbon Dioxide   28 


 


Anion Gap   16 


 


BUN   6 L 


 


Creatinine   0.4 L 


 


Est GFR ( Amer)   > 60 


 


Est GFR (Non-Af Amer)   > 60 


 


Random Glucose   214 H 


 


Calcium   9.7 


 


Magnesium   1.9 


 


Total Bilirubin   0.6 


 


AST   53 H D 


 


ALT   35 


 


Alkaline Phosphatase   148 H 


 


Total Protein   8.5 H 


 


Albumin   4.4 


 


Globulin   4.1 H 


 


Albumin/Globulin Ratio   1.1 


 


Lipase   47 


 


Urine Color    Yellow


 


Urine Clarity    Cloudy


 


Urine pH    5.0


 


Ur Specific Gravity    1.023


 


Urine Protein    30


 


Urine Glucose (UA)    150


 


Urine Ketones    Negative


 


Urine Blood    Negative


 


Urine Nitrate    Negative


 


Urine Bilirubin    Negative


 


Urine Urobilinogen    4.0 H


 


Ur Leukocyte Esterase    Neg


 


Urine RBC (Auto)    5 H


 


Urine Microscopic WBC    4


 


Ur Squamous Epith Cells    4


 


Amorphous Sediment    Rare H


 


Urine Bacteria    Rare


 


Hyaline Casts    0-2














Assessment & Plan





- Assessment and Plan (Free Text)


Plan: 





47 yo ,f, PMhx/o of HTN, chronic back pain, Colon cancer w/ metastasis to liver

, Depression, Fibromyalgia, GERD admitted for intractable abdominal pain 








1) Chronic intractable Abdominal pain and back pain 


-2/2 metastatic colon Ca


- Chronic, H/O multiple lumbar surgeries. 


- Dilaudid 1,5  mg IVP in ED


- Dulaudid 1 mg IV Q4h sev pain


-Morphine 4 mg Q4h IV mod pain


-Zofran PRN vomiting


- CT abd: Heterogeneous diminished attenuation in the posterior right hepatic 

lobe consistent with known colonic mass metastatic disease and unchanged in 

appearance compared to the prior examination. Mild retroperitoneal 

lymphadenopathy.  Nonspecific mesenteric panniculitis.  Trace right pleural 

effusion.


- May need pain management consult Dr Winn 





2) Constipation 


-Miralax Po daily 


-Docusate 200 mg PO daily 





3) NIDDM


- Uncontrolled 


- Last A1c 7.7 on 5/28/18


- Low dose sliding scale


-c/w metformin 


-Hypoglycemia protocol 





4) HTN


- Amlodipine 10 mg QD





5) Fibromyalgia


- Controlled


- Duloxetine 60 mg PO HS





6) DVT Prophylaxis


- Lovenox 40 mg SC daily

## 2018-08-29 NOTE — CT
Date of service: 



08/29/2018



PROCEDURE:  CT Abdomen and Pelvis with contrast



HISTORY:

metastatic ca



COMPARISON:

7/19/2018



TECHNIQUE:

Contrast dose: 95 mL Omnipaque 300



Radiation dose:



Total exam DLP = 704.92 mGy-cm.



This CT exam was performed using one or more of the following dose 

reduction techniques: Automated exposure control, adjustment of the 

mA and/or kV according to patient size, and/or use of iterative 

reconstruction technique.



FINDINGS:



LOWER THORAX:

Trace right pleural effusion. 



LIVER:

Normal size and contour.  Heterogeneous diminished attenuation in the 

posterior right hepatic lobe essentially unchanged in appearance from 

prior examination consistent with known metastasis from colonic 

neoplasm. 



GALLBLADDER AND BILE DUCTS:

Not visualized.  Possible contracted gallbladder. 



PANCREAS:

Unremarkable. No gross lesion or ductal dilatation.



SPLEEN:

Unremarkable. 



ADRENALS:

Unremarkable. No mass. 



KIDNEYS AND URETERS:

Unremarkable. No hydronephrosis. No solid mass. 



VASCULATURE:

Unremarkable. No aortic aneurysm. 



BOWEL:

Unremarkable. No obstruction. No gross mural thickening. 



APPENDIX:

Normal appendix. 



PERITONEUM:

No ascites. No pneumoperitoneum.  There is hazy increased density in 

the small bowel mesenteric in a circumscribed fashion, associated 

with shotty subcentimeter mesenteric nodes.  Findings consistent with 

nonspecific mesenteric panniculitis. 



LYMPH NODES:

There is aortocaval retroperitoneal lymphadenopathy. There are mildly 

enlarged left paraaortic lymph nodes. There is no pelvic 

lymphadenopathy. 



BLADDER:

Poorly distended 



REPRODUCTIVE:

Unremarkable uterus 



BONES:

No acute fracture. 



OTHER FINDINGS:

None.



IMPRESSION:

Heterogeneous diminished attenuation in the posterior right hepatic 

lobe consistent with known colonic mass metastatic disease and 

unchanged in appearance compared to the prior examination. Mild 

retroperitoneal lymphadenopathy.  Nonspecific mesenteric 

panniculitis.  Trace right pleural effusion.

## 2018-08-30 LAB
BACTERIA #/AREA URNS HPF: (no result) /[HPF]
BASOPHILS # BLD AUTO: 0 K/UL (ref 0–0.2)
BASOPHILS NFR BLD: 0.3 % (ref 0–2)
BILIRUB UR-MCNC: NEGATIVE MG/DL
BUN SERPL-MCNC: < 2 MG/DL (ref 7–17)
CALCIUM SERPL-MCNC: 9.5 MG/DL (ref 8.4–10.2)
COLOR UR: (no result)
EOSINOPHIL # BLD AUTO: 0.2 K/UL (ref 0–0.7)
EOSINOPHIL NFR BLD: 2.2 % (ref 0–4)
ERYTHROCYTE [DISTWIDTH] IN BLOOD BY AUTOMATED COUNT: 13.9 % (ref 11.5–14.5)
GFR NON-AFRICAN AMERICAN: > 60
GLUCOSE UR STRIP-MCNC: (no result) MG/DL
HGB BLD-MCNC: 12.4 G/DL (ref 12–16)
LEUKOCYTE ESTERASE UR-ACNC: (no result) LEU/UL
LYMPHOCYTES # BLD AUTO: 0.8 K/UL (ref 1–4.3)
LYMPHOCYTES NFR BLD AUTO: 11.2 % (ref 20–40)
MCH RBC QN AUTO: 28.5 PG (ref 27–31)
MCHC RBC AUTO-ENTMCNC: 33 G/DL (ref 33–37)
MCV RBC AUTO: 86.3 FL (ref 81–99)
MONOCYTES # BLD: 0.9 K/UL (ref 0–0.8)
MONOCYTES NFR BLD: 13.2 % (ref 0–10)
NEUTROPHILS # BLD: 5 K/UL (ref 1.8–7)
NEUTROPHILS NFR BLD AUTO: 73.1 % (ref 50–75)
NRBC BLD AUTO-RTO: 0.1 % (ref 0–0)
PH UR STRIP: 8 [PH] (ref 5–8)
PLATELET # BLD: 259 K/UL (ref 130–400)
PMV BLD AUTO: 8.8 FL (ref 7.2–11.7)
PROT UR STRIP-MCNC: NEGATIVE MG/DL
RBC # BLD AUTO: 4.35 MIL/UL (ref 3.8–5.2)
RBC # UR STRIP: NEGATIVE /UL
SP GR UR STRIP: 1.01 (ref 1–1.03)
SQUAMOUS EPITHIAL: 1 /HPF (ref 0–5)
URINE CLARITY: CLEAR
UROBILINOGEN UR-MCNC: (no result) MG/DL (ref 0.2–1)
WBC # BLD AUTO: 6.9 K/UL (ref 4.8–10.8)

## 2018-08-30 RX ADMIN — POLYETHYLENE GLYCOL 3350 SCH GM: 17 POWDER, FOR SOLUTION ORAL at 16:55

## 2018-08-30 RX ADMIN — Medication SCH MG: at 16:54

## 2018-08-30 RX ADMIN — OXYCODONE HYDROCHLORIDE SCH MG: 20 TABLET, FILM COATED, EXTENDED RELEASE ORAL at 20:57

## 2018-08-30 RX ADMIN — PANTOPRAZOLE SODIUM SCH MG: 40 TABLET, DELAYED RELEASE ORAL at 08:32

## 2018-08-30 RX ADMIN — INSULIN LISPRO SCH: 100 INJECTION, SOLUTION INTRAVENOUS; SUBCUTANEOUS at 16:12

## 2018-08-30 RX ADMIN — POLYETHYLENE GLYCOL 3350 SCH: 17 POWDER, FOR SOLUTION ORAL at 16:01

## 2018-08-30 RX ADMIN — INSULIN LISPRO SCH: 100 INJECTION, SOLUTION INTRAVENOUS; SUBCUTANEOUS at 22:12

## 2018-08-30 RX ADMIN — INSULIN LISPRO SCH: 100 INJECTION, SOLUTION INTRAVENOUS; SUBCUTANEOUS at 11:45

## 2018-08-30 RX ADMIN — INSULIN LISPRO SCH: 100 INJECTION, SOLUTION INTRAVENOUS; SUBCUTANEOUS at 08:33

## 2018-08-30 RX ADMIN — ENOXAPARIN SODIUM SCH MG: 40 INJECTION SUBCUTANEOUS at 08:31

## 2018-08-30 NOTE — CP.PCM.PN
Subjective





- Date & Time of Evaluation


Date of Evaluation: 08/30/18


Time of Evaluation: 11:20





- Subjective


Subjective: 


Patient seen and evaluated at bedside in AM. Patient reports incomplete 

improvement with dilaudid 1 mg. Patient also reports lower back pain which is 

not improving. No BM for last 5 days. Patient's nausea persists but vomiting 

has resolved. Denies any fever, chills, CP, SOB, headache, dizziness or urinary 

symptoms. 








Objective





- Vital Signs/Intake and Output


Vital Signs (last 24 hours): 


 











Temp Pulse Resp BP Pulse Ox


 


 99 F   105 H  18   120/81   99 


 


 08/30/18 16:33  08/30/18 16:33  08/30/18 16:33  08/30/18 16:33  08/30/18 16:33











- Medications


Medications: 


 Current Medications





Acetaminophen (Tylenol 325mg Tab)  650 mg PO Q6 PRN


   PRN Reason: Pain, Mild (1-3)


Acetaminophen (Tylenol 325mg Tab)  650 mg PO Q6 PRN


   PRN Reason: Fever >100.4 F


Amlodipine Besylate (Norvasc)  10 mg PO DAILY [facility]


   Last Admin: 08/30/18 08:32 Dose:  10 mg


Bisacodyl (Dulcolax)  5 mg PO BID [facility]


   Last Admin: 08/30/18 16:54 Dose:  5 mg


Dextrose (Dextrose 50% Inj)  0 ml IV STAT PRN; Protocol


   PRN Reason: Hypoglycemia Protocol


Dextrose (Glutose 15)  0 gm PO ONCE PRN; Protocol


   PRN Reason: Hypoglycemia Protocol


Docusate Sodium (Colace)  200 mg PO DAILY [facility]


   Last Admin: 08/30/18 08:32 Dose:  200 mg


Duloxetine HCl (Cymbalta)  60 mg PO HS [facility]


Enoxaparin Sodium (Lovenox)  40 mg SC DAILY [facility]


   PRN Reason: Protocol


   Last Admin: 08/30/18 08:31 Dose:  40 mg


Gabapentin (Neurontin)  300 mg PO Q8 [facility]


   Last Admin: 08/30/18 16:01 Dose:  300 mg


Glucagon (Glucagen Diagnostic Kit)  0 mg IM STAT PRN; Protocol


   PRN Reason: Hypoglycemia Protocol


Hydromorphone HCl (Dilaudid)  1.5 mg IVP Q4H PRN


   PRN Reason: Pain, severe (8-10)


   Last Admin: 08/30/18 16:54 Dose:  1.5 mg


Sodium Chloride (Sodium Chloride 0.9%)  1,000 mls @ 110 mls/hr IV .Q9H6M [facility]


   Stop: 08/30/18 20:02


   Last Admin: 08/30/18 14:33 Dose:  Not Given


Insulin Human Lispro (Humalog)  0 units SC ACHS [facility]


   PRN Reason: Protocol


   Last Admin: 08/30/18 16:12 Dose:  Not Given


Lidocaine (Lidoderm)  1 ea TD DAILY [facility]


Metformin HCl (Glucophage)  500 mg PO DAILY [facility]


   Last Admin: 08/30/18 08:32 Dose:  500 mg


Morphine Sulfate (Morphine)  4 mg IVP Q4 PRN


   PRN Reason: Pain, moderate (4-7)


   Last Admin: 08/29/18 20:39 Dose:  4 mg


Ondansetron HCl (Zofran Inj)  4 mg IVP Q6 PRN


   PRN Reason: Nausea/Vomiting


   Last Admin: 08/30/18 13:18 Dose:  4 mg


Oxycodone HCl (Oxycontin Extended Release Tab)  20 mg PO Q12 [facility]


Pantoprazole Sodium (Protonix Ec Tab)  40 mg PO DAILY [facility]


   Last Admin: 08/30/18 08:32 Dose:  40 mg


Polyethylene Glycol (Miralax)  17 gm PO BID [facility]


   Last Admin: 08/30/18 16:55 Dose:  17 gm


Sennosides (Senokot Tab)  8.6 mg PO BID [facility]











- Labs


Labs: 


 





 08/30/18 06:05 





 08/30/18 06:05 











- Constitutional


Appears: Non-toxic, In Acute Distress





- Head Exam


Head Exam: ATRAUMATIC, NORMAL INSPECTION, NORMOCEPHALIC





- Eye Exam


Eye Exam: Normal appearance, PERRL





- ENT Exam


ENT Exam: Mucous Membranes Moist





- Respiratory Exam


Respiratory Exam: Clear to Ausculation Bilateral, NORMAL BREATHING PATTERN.  

absent: Decreased Breath Sounds, Rales, Rhonchi, Wheezes





- Cardiovascular Exam


Cardiovascular Exam: REGULAR RHYTHM, +S1, +S2.  absent: Murmur





- GI/Abdominal Exam


GI & Abdominal Exam: Tenderness, Normal Bowel Sounds.  absent: Distended, Firm, 

Guarding





- Extremities Exam


Extremities Exam: absent: Calf Tenderness, Pedal Edema, Tenderness





- Back Exam


Back Exam: muscle spasm, tenderness.  absent: CVA tenderness (L), CVA 

tenderness (R)





- Neurological Exam


Neurological Exam: Alert, Awake, Oriented x3





- Psychiatric Exam


Psychiatric exam: Normal Affect, Normal Mood





- Skin


Skin Exam: Dry, Intact, Normal Color





Assessment and Plan





- Assessment and Plan (Free Text)


Assessment: 


47 yo ,f, PMhx/o of HTN, chronic back pain, Colon cancer w/ metastasis to liver

, Depression, Fibromyalgia, GERD admitted for intractable abdominal pain.








Plan: 


Chronic intractable Abdominal pain and back pain 


- 2/2 metastatic colon Ca


- Chronic, H/O multiple lumbar surgeries. 


- Dilaudid 1.5  mg IVP in ED


- Dulaudid increased from 1 mg IV Q4h to 1.5 mg Q4


- Oxycontin 20 mg ER Q12 for pain


- Morphine 4 mg Q4h IV mod pain


- Lidoderm patch for back pain


- Zofran PRN vomiting


- CT abd: Heterogeneous diminished attenuation in the posterior right hepatic 

lobe consistent with known colonic mass metastatic disease and unchanged in 

appearance compared to the prior examination. Mild retroperitoneal 

lymphadenopathy.  Nonspecific mesenteric panniculitis.  Trace right pleural 

effusion.


- pain management consult on board





Constipation 


-No BM since last 4 days


-Miralax PO


-Dulcolex 


-Docusate 200 mg and senna PO





NIDDM


- Controlled 


- Last A1c 7.7 on 5/28/18


- Low dose sliding scale


- c/w metformin 


- Hypoglycemia protocol 





HTN


-controlled


-Amlodipine 10 mg QD


-Monitor vitals





Fibromyalgia


- Controlled


- Gabapentin 300 mg TID


- Duloxetine 60 mg PO HS





DVT Prophylaxis


- Lovenox 40 mg SC daily





Code Status 


- DNR/DNI

## 2018-08-31 RX ADMIN — OXYCODONE HYDROCHLORIDE SCH MG: 20 TABLET, FILM COATED, EXTENDED RELEASE ORAL at 21:10

## 2018-08-31 RX ADMIN — INSULIN LISPRO SCH: 100 INJECTION, SOLUTION INTRAVENOUS; SUBCUTANEOUS at 21:15

## 2018-08-31 RX ADMIN — ENOXAPARIN SODIUM SCH MG: 40 INJECTION SUBCUTANEOUS at 09:05

## 2018-08-31 RX ADMIN — INSULIN LISPRO SCH: 100 INJECTION, SOLUTION INTRAVENOUS; SUBCUTANEOUS at 09:10

## 2018-08-31 RX ADMIN — INSULIN LISPRO SCH UNITS: 100 INJECTION, SOLUTION INTRAVENOUS; SUBCUTANEOUS at 13:04

## 2018-08-31 RX ADMIN — PANTOPRAZOLE SODIUM SCH MG: 40 TABLET, DELAYED RELEASE ORAL at 09:08

## 2018-08-31 RX ADMIN — Medication SCH MG: at 16:53

## 2018-08-31 RX ADMIN — Medication SCH MG: at 09:08

## 2018-08-31 RX ADMIN — OXYCODONE HYDROCHLORIDE SCH MG: 20 TABLET, FILM COATED, EXTENDED RELEASE ORAL at 09:15

## 2018-08-31 RX ADMIN — POLYETHYLENE GLYCOL 3350 SCH GM: 17 POWDER, FOR SOLUTION ORAL at 09:07

## 2018-08-31 RX ADMIN — POLYETHYLENE GLYCOL 3350 SCH GM: 17 POWDER, FOR SOLUTION ORAL at 16:52

## 2018-08-31 RX ADMIN — INSULIN LISPRO SCH: 100 INJECTION, SOLUTION INTRAVENOUS; SUBCUTANEOUS at 16:50

## 2018-08-31 NOTE — CP.PCM.PN
Subjective





- Date & Time of Evaluation


Date of Evaluation: 08/31/18


Time of Evaluation: 10:10





- Subjective


Subjective: 


Patient seen and evaluated at bedtime at bedside in the morning. NAD. No acute 

events overnight. Patient sitting comfortably in bed. Patient's abdominal pain 

and back pain has improved by 80% compare to yesterday. Nausea and vomiting 

improving as well. No BM since Tuesday. Denies any headache, CP, SOB, diarrhea, 

dysuria, urgency and frequency. Tolerating ensure and juices PO.








Objective





- Vital Signs/Intake and Output


Vital Signs (last 24 hours): 


 











Temp Pulse Resp BP Pulse Ox


 


 97.8 F   85   18   130/81   98 


 


 08/31/18 08:12  08/31/18 09:09  08/31/18 08:12  08/31/18 09:09  08/31/18 08:12











- Medications


Medications: 


 Current Medications





Acetaminophen (Tylenol 325mg Tab)  650 mg PO Q6 PRN


   PRN Reason: Pain, Mild (1-3)


Acetaminophen (Tylenol 325mg Tab)  650 mg PO Q6 PRN


   PRN Reason: Fever >100.4 F


Amlodipine Besylate (Norvasc)  10 mg PO DAILY [facility]


   Last Admin: 08/31/18 09:09 Dose:  10 mg


Bisacodyl (Dulcolax)  5 mg PO BID [facility]


   Last Admin: 08/31/18 09:08 Dose:  5 mg


Dextrose (Dextrose 50% Inj)  0 ml IV STAT PRN; Protocol


   PRN Reason: Hypoglycemia Protocol


Dextrose (Glutose 15)  0 gm PO ONCE PRN; Protocol


   PRN Reason: Hypoglycemia Protocol


Docusate Sodium (Colace)  200 mg PO DAILY [facility]


   Last Admin: 08/31/18 09:07 Dose:  200 mg


Duloxetine HCl (Cymbalta)  60 mg PO HS [facility]


   Last Admin: 08/30/18 21:00 Dose:  60 mg


Enoxaparin Sodium (Lovenox)  40 mg SC DAILY [facility]


   PRN Reason: Protocol


   Last Admin: 08/31/18 09:05 Dose:  40 mg


Gabapentin (Neurontin)  300 mg PO Q8 [facility]


   Last Admin: 08/31/18 09:08 Dose:  300 mg


Glucagon (Glucagen Diagnostic Kit)  0 mg IM STAT PRN; Protocol


   PRN Reason: Hypoglycemia Protocol


Hydromorphone HCl (Dilaudid)  2 mg PO Q4 PRN


   PRN Reason: Pain, severe (8-10)


Insulin Human Lispro (Humalog)  0 units SC ACHS [facility]


   PRN Reason: Protocol


   Last Admin: 08/31/18 13:04 Dose:  2 units


Lidocaine (Lidoderm)  1 ea TD DAILY [facility]


   Last Admin: 08/31/18 09:10 Dose:  1 ea


Metformin HCl (Glucophage)  500 mg PO DAILY [facility]


   Last Admin: 08/31/18 09:08 Dose:  500 mg


Ondansetron HCl (Zofran Inj)  4 mg IVP Q6 PRN


   PRN Reason: Nausea/Vomiting


   Last Admin: 08/31/18 06:29 Dose:  4 mg


Oxycodone HCl (Oxycontin Extended Release Tab)  20 mg PO ONCE ONE


   Stop: 08/31/18 13:08


Oxycodone HCl (Oxycontin Extended Release Tab)  40 mg PO Q12 [facility]


Pantoprazole Sodium (Protonix Ec Tab)  40 mg PO DAILY [facility]


   Last Admin: 08/31/18 09:08 Dose:  40 mg


Polyethylene Glycol (Miralax)  17 gm PO BID [facility]


   Last Admin: 08/31/18 09:07 Dose:  17 gm


Sennosides (Senokot Tab)  8.6 mg PO BID [facility]


   Last Admin: 08/31/18 09:08 Dose:  8.6 mg











- Labs


Labs: 


 





 08/30/18 06:05 





 08/30/18 06:05 











- Constitutional


Appears: Well, Non-toxic, No Acute Distress





- Head Exam


Head Exam: ATRAUMATIC, NORMAL INSPECTION, NORMOCEPHALIC





- Eye Exam


Eye Exam: Normal appearance, PERRL





- ENT Exam


ENT Exam: Mucous Membranes Moist





- Neck Exam


Neck Exam: Full ROM





- Respiratory Exam


Respiratory Exam: Clear to Ausculation Bilateral, NORMAL BREATHING PATTERN.  

absent: Rales, Rhonchi, Wheezes





- Cardiovascular Exam


Cardiovascular Exam: REGULAR RHYTHM, +S1, +S2.  absent: Murmur





- GI/Abdominal Exam


GI & Abdominal Exam: Soft, Normal Bowel Sounds.  absent: Firm, Guarding, Rigid, 

Tenderness, Hyperactive Bowel Sounds





- Extremities Exam


Extremities Exam: absent: Calf Tenderness, Pedal Edema, Tenderness





- Back Exam


Back Exam: absent: CVA tenderness (L), CVA tenderness (R)





- Neurological Exam


Neurological Exam: Alert, Awake, Oriented x3





- Psychiatric Exam


Psychiatric exam: Normal Affect, Normal Mood





- Skin


Skin Exam: Dry, Intact, Normal Color





Assessment and Plan





- Assessment and Plan (Free Text)


Assessment: 


49 yo ,f, PMhx/o of HTN, chronic back pain, Colon cancer w/ metastasis to liver

, Depression, Fibromyalgia, GERD admitted for intractable abdominal pain. 

Abdominal and back pain 80% improved. Last BM Tuesday.


Plan: 


Chronic intractable Abdominal pain and back pain 


- 2/2 metastatic colon Ca


- Chronic, H/O multiple lumbar surgeries. 


- Dilaudid 1.5 IVP switched to Dilaudid 2 mg PO Q4hr PRN 


- Oxycontin increased from 20 to 40 mg Q12 for pain


- D/C Morphine 4 mg Q4h IV mod pain


- Lidoderm patch for back pain


- Zofran PRN vomiting


- Pain management consult on board: Appreciated recs. 





Constipation 


-No BM since Tuesday


-Miralax


-Dulcolex 


-Docusate 200 mg and senna PO





NIDDM


- Controlled 


- Last A1c 7.7 on 5/28/18


- Low dose sliding scale


- c/w metformin 


- Hypoglycemia protocol 





HTN


-controlled


-Amlodipine 10 mg QD


-Monitor vitals





Fibromyalgia


- Controlled


- Gabapentin 300 mg TID


- Duloxetine 60 mg PO HS





DVT Prophylaxis


- Lovenox 40 mg SC daily





Code Status 


- DNR/DNI

## 2018-09-01 VITALS
DIASTOLIC BLOOD PRESSURE: 69 MMHG | TEMPERATURE: 98 F | OXYGEN SATURATION: 98 % | RESPIRATION RATE: 20 BRPM | SYSTOLIC BLOOD PRESSURE: 106 MMHG | HEART RATE: 77 BPM

## 2018-09-01 RX ADMIN — ENOXAPARIN SODIUM SCH MG: 40 INJECTION SUBCUTANEOUS at 09:08

## 2018-09-01 RX ADMIN — POLYETHYLENE GLYCOL 3350 SCH GM: 17 POWDER, FOR SOLUTION ORAL at 09:09

## 2018-09-01 RX ADMIN — Medication SCH MG: at 09:10

## 2018-09-01 RX ADMIN — INSULIN LISPRO SCH: 100 INJECTION, SOLUTION INTRAVENOUS; SUBCUTANEOUS at 07:38

## 2018-09-01 RX ADMIN — OXYCODONE HYDROCHLORIDE SCH MG: 20 TABLET, FILM COATED, EXTENDED RELEASE ORAL at 09:15

## 2018-09-01 RX ADMIN — PANTOPRAZOLE SODIUM SCH MG: 40 TABLET, DELAYED RELEASE ORAL at 09:12

## 2018-09-01 NOTE — CP.PCM.DIS
<Sultan William - Last Filed: 09/01/18 20:01>





Provider





- Provider


Date of Admission: 


08/29/18 18:24





Attending physician: 


Edin Patel MD





Time Spent in preparation of Discharge (in minutes): 25





Diagnosis





- Discharge Diagnosis


(1) Abdominal pain


Status: Acute   





(2) Back pain


Status: Resolved   





(3) Intractable abdominal pain


Status: Resolved   





Hospital Course





- Lab Results


Lab Results: 


 Micro Results





08/29/18 14:41   Urine   Urine Culture - Final


                            No Growth (<1,000 CFU/ML)





 Most Recent Lab Values











WBC  6.9 K/uL (4.8-10.8)   08/30/18  06:05    


 


RBC  4.35 Mil/uL (3.80-5.20)   08/30/18  06:05    


 


Hgb  12.4 g/dL (12.0-16.0)   08/30/18  06:05    


 


Hct  37.5 % (34.0-47.0)   08/30/18  06:05    


 


MCV  86.3 fl (81.0-99.0)   08/30/18  06:05    


 


MCH  28.5 pg (27.0-31.0)   08/30/18  06:05    


 


MCHC  33.0 g/dL (33.0-37.0)   08/30/18  06:05    


 


RDW  13.9 % (11.5-14.5)   08/30/18  06:05    


 


Plt Count  259 K/uL (130-400)   08/30/18  06:05    


 


MPV  8.8 fl (7.2-11.7)   08/30/18  06:05    


 


Neut % (Auto)  73.1 % (50.0-75.0)   08/30/18  06:05    


 


Lymph % (Auto)  11.2 % (20.0-40.0)  L  08/30/18  06:05    


 


Mono % (Auto)  13.2 % (0.0-10.0)  H  08/30/18  06:05    


 


Eos % (Auto)  2.2 % (0.0-4.0)   08/30/18  06:05    


 


Baso % (Auto)  0.3 % (0.0-2.0)   08/30/18  06:05    


 


Neut # (Auto)  5.0 K/uL (1.8-7.0)   08/30/18  06:05    


 


Lymph # (Auto)  0.8 K/uL (1.0-4.3)  L  08/30/18  06:05    


 


Mono # (Auto)  0.9 K/uL (0.0-0.8)  H  08/30/18  06:05    


 


Eos # (Auto)  0.2 K/uL (0.0-0.7)   08/30/18  06:05    


 


Baso # (Auto)  0.0 K/uL (0.0-0.2)   08/30/18  06:05    


 


Sodium  135 mmol/l (132-148)   08/30/18  06:05    


 


Potassium  3.9 MMOL/L (3.6-5.0)   08/30/18  06:05    


 


Chloride  96 mmol/L ()  L  08/30/18  06:05    


 


Carbon Dioxide  29 mmol/L (22-30)   08/30/18  06:05    


 


Anion Gap  14  (10-20)   08/30/18  06:05    


 


BUN  < 2 mg/dl (7-17)  L  08/30/18  06:05    


 


Creatinine  0.3 mg/dl (0.7-1.2)  L  08/30/18  06:05    


 


Est GFR ( Amer)  > 60   08/30/18  06:05    


 


Est GFR (Non-Af Amer)  > 60   08/30/18  06:05    


 


POC Glucose (mg/dL)  123 mg/dL ()  H  09/01/18  05:25    


 


Random Glucose  154 mg/dL ()  H  08/30/18  06:05    


 


Calcium  9.5 mg/dL (8.4-10.2)   08/30/18  06:05    


 


Magnesium  1.9 MG/DL (1.6-2.3)   08/29/18  13:55    


 


Total Bilirubin  0.6 mg/dl (0.2-1.3)   08/29/18  13:55    


 


AST  53 U/L (14-36)  H D 08/29/18  13:55    


 


ALT  35 U/L (9-52)   08/29/18  13:55    


 


Alkaline Phosphatase  148 U/L ()  H  08/29/18  13:55    


 


Total Protein  8.5 G/DL (6.3-8.2)  H  08/29/18  13:55    


 


Albumin  4.4 g/dL (3.5-5.0)   08/29/18  13:55    


 


Globulin  4.1 gm/dL (2.2-3.9)  H  08/29/18  13:55    


 


Albumin/Globulin Ratio  1.1  (1.0-2.1)   08/29/18  13:55    


 


Lipase  47 U/L ()   08/29/18  13:55    


 


Urine Color  Straw  (YELLOW)   08/30/18  01:30    


 


Urine Clarity  Clear  (Clear)   08/30/18  01:30    


 


Urine pH  8.0  (5.0-8.0)   08/30/18  01:30    


 


Ur Specific Gravity  1.009  (1.003-1.030)   08/30/18  01:30    


 


Urine Protein  Negative mg/dL (NEGATIVE)   08/30/18  01:30    


 


Urine Glucose (UA)  Neg mg/dL (Normal)   08/30/18  01:30    


 


Urine Ketones  Negative mg/dL (NEGATIVE)   08/30/18  01:30    


 


Urine Blood  Negative  (NEGATIVE)   08/30/18  01:30    


 


Urine Nitrate  Negative  (NEGATIVE)   08/30/18  01:30    


 


Urine Bilirubin  Negative  (NEGATIVE)   08/30/18  01:30    


 


Urine Urobilinogen  0.2-1.0 mg/dL (0.2-1.0)   08/30/18  01:30    


 


Ur Leukocyte Esterase  Neg Delia/uL (Negative)   08/30/18  01:30    


 


Urine RBC (Auto)  1 /hpf (0-3)   08/30/18  01:30    


 


Urine Microscopic WBC  < 1 /hpf (0-5)   08/30/18  01:30    


 


Ur Squamous Epith Cells  1 /hpf (0-5)   08/30/18  01:30    


 


Amorphous Sediment  Rare /ul (<OCC)  H  08/29/18  14:41    


 


Urine Bacteria  Rare  (<OCC)   08/30/18  01:30    


 


Hyaline Casts  0-2 /hpf (0-2)   08/29/18  14:41    














- Hospital Course


Hospital Course: 





49 yo ,f, PMhx/o of HTN, chronic back pain, Colon cancer w/ metastasis to liver

, Depression, Fibromyalgia, GERD admitted for intractable abdominal pain on 8/29 /18. CT of abdomen and pelvis on 8/29/18 shows heterogeneous diminished 

attenuation in the posterior right hepatic lobe consistent with known colonic 

mass metastatic disease and unchanged in appearance compared to the prior 

examination. Mild retroperitoneal lymphadenopathy.  Nonspecific mesenteric 

panniculitis.  Trace right pleural effusion. Pt's pain was managed with 

dilaudid 2 mg po q4hr and oxycontin 40 mg q12 hours. Today, patient reports her 

pain is controlled with the pain medications. Denies nausea, vomiting, 

constipation, fever, chills or dizziness. Pt is medically stable to discharge 

home.  Prior authorization form was filled out for oxycontin 40 mg. Pt was 

given one week supply of dilaudid and oxycontin and advised to f/u with pt's 

pain management physician Dr. Winn. Pt has appointment on 9/4/18.














Chronic intractable Abdominal pain and back pain 


- 2/2 metastatic colon Ca


- Chronic, H/O multiple lumbar surgeries. 


- Dilaudid 1.5 IVP switched to Dilaudid 2 mg PO Q4hr PRN 


- Oxycontin increased from 20 to 40 mg Q12 for pain


- D/C Morphine 4 mg Q4h IV mod pain


- Lidoderm patch for back pain


- Zofran PRN vomiting


- Pain management consult on board: Appreciated recs. 





Constipation 


-Miralax


-Dulcolex 


-Docusate 200 mg and senna PO





NIDDM


- Controlled 


- Last A1c 7.7 on 5/28/18


- Low dose sliding scale


- c/w metformin 


- Hypoglycemia protocol 





HTN


-controlled


-Amlodipine 10 mg QD


-Monitor vitals





Fibromyalgia


- Controlled


- Gabapentin 300 mg TID


- Duloxetine 60 mg PO HS





Discharge Exam





- Head Exam


Head Exam: ATRAUMATIC, NORMAL INSPECTION, NORMOCEPHALIC





- Eye Exam


Eye Exam: Normal appearance





- ENT Exam


ENT Exam: Mucous Membranes Moist





- Respiratory Exam


Respiratory Exam: Clear to PA & Lateral, NORMAL BREATHING PATTERN





- Cardiovascular Exam


Cardiovascular Exam: REGULAR RHYTHM, +S1, +S2





- GI/Abdominal Exam


GI & Abdominal Exam: Normal Bowel Sounds, Soft.  absent: Tenderness





- Extremities Exam


Extremities exam: normal inspection





- Back Exam


Back exam: absent: CVA tenderness (L), CVA tenderness (R)





- Neurological Exam


Neurological exam: Alert, Oriented x3





- Psychiatric Exam


Psychiatric exam: Normal Affect, Normal Mood





Discharge Plan





- Discharge Medications


Prescriptions: 


HYDROmorphone [Dilaudid] 2 mg PO Q4 #42 tab


Lidocaine 5% [Lidoderm] 1 ea TD DAILY #7 patch


oxyCODONE [oxyCONTIN Extended Release Tab] 40 mg PO Q12 #14 tabsr





- Follow Up Plan


Condition: FAIR


Disposition: HOME/ ROUTINE


Patient education suggested?: Yes


Instructions:  Acute Abdomen (Belly Pain), Adult (DC)


Additional Instructions: 


follow up with your pain management doctor Dr. Winn on 9/4/18


Referrals: 


[facility] [Outside]


Sonido Winn MD [Staff Provider] - 





<Heike Bruno - Last Filed: 09/02/18 05:17>





Provider





- Provider


Date of Admission: 


08/29/18 18:24





Attending physician: 


Edin Patel MD








Hospital Course





- Lab Results


Lab Results: 


 Micro Results





08/29/18 14:41   Urine   Urine Culture - Final


                            No Growth (<1,000 CFU/ML)





 Most Recent Lab Values











WBC  6.9 K/uL (4.8-10.8)   08/30/18  06:05    


 


RBC  4.35 Mil/uL (3.80-5.20)   08/30/18  06:05    


 


Hgb  12.4 g/dL (12.0-16.0)   08/30/18  06:05    


 


Hct  37.5 % (34.0-47.0)   08/30/18  06:05    


 


MCV  86.3 fl (81.0-99.0)   08/30/18  06:05    


 


MCH  28.5 pg (27.0-31.0)   08/30/18  06:05    


 


MCHC  33.0 g/dL (33.0-37.0)   08/30/18  06:05    


 


RDW  13.9 % (11.5-14.5)   08/30/18  06:05    


 


Plt Count  259 K/uL (130-400)   08/30/18  06:05    


 


MPV  8.8 fl (7.2-11.7)   08/30/18  06:05    


 


Neut % (Auto)  73.1 % (50.0-75.0)   08/30/18  06:05    


 


Lymph % (Auto)  11.2 % (20.0-40.0)  L  08/30/18  06:05    


 


Mono % (Auto)  13.2 % (0.0-10.0)  H  08/30/18  06:05    


 


Eos % (Auto)  2.2 % (0.0-4.0)   08/30/18  06:05    


 


Baso % (Auto)  0.3 % (0.0-2.0)   08/30/18  06:05    


 


Neut # (Auto)  5.0 K/uL (1.8-7.0)   08/30/18  06:05    


 


Lymph # (Auto)  0.8 K/uL (1.0-4.3)  L  08/30/18  06:05    


 


Mono # (Auto)  0.9 K/uL (0.0-0.8)  H  08/30/18  06:05    


 


Eos # (Auto)  0.2 K/uL (0.0-0.7)   08/30/18  06:05    


 


Baso # (Auto)  0.0 K/uL (0.0-0.2)   08/30/18  06:05    


 


Sodium  135 mmol/l (132-148)   08/30/18  06:05    


 


Potassium  3.9 MMOL/L (3.6-5.0)   08/30/18  06:05    


 


Chloride  96 mmol/L ()  L  08/30/18  06:05    


 


Carbon Dioxide  29 mmol/L (22-30)   08/30/18  06:05    


 


Anion Gap  14  (10-20)   08/30/18  06:05    


 


BUN  < 2 mg/dl (7-17)  L  08/30/18  06:05    


 


Creatinine  0.3 mg/dl (0.7-1.2)  L  08/30/18  06:05    


 


Est GFR ( Amer)  > 60   08/30/18  06:05    


 


Est GFR (Non-Af Amer)  > 60   08/30/18  06:05    


 


POC Glucose (mg/dL)  163 mg/dL ()  H  09/01/18  10:59    


 


Random Glucose  154 mg/dL ()  H  08/30/18  06:05    


 


Calcium  9.5 mg/dL (8.4-10.2)   08/30/18  06:05    


 


Magnesium  1.9 MG/DL (1.6-2.3)   08/29/18  13:55    


 


Total Bilirubin  0.6 mg/dl (0.2-1.3)   08/29/18  13:55    


 


AST  53 U/L (14-36)  H D 08/29/18  13:55    


 


ALT  35 U/L (9-52)   08/29/18  13:55    


 


Alkaline Phosphatase  148 U/L ()  H  08/29/18  13:55    


 


Total Protein  8.5 G/DL (6.3-8.2)  H  08/29/18  13:55    


 


Albumin  4.4 g/dL (3.5-5.0)   08/29/18  13:55    


 


Globulin  4.1 gm/dL (2.2-3.9)  H  08/29/18  13:55    


 


Albumin/Globulin Ratio  1.1  (1.0-2.1)   08/29/18  13:55    


 


Lipase  47 U/L ()   08/29/18  13:55    


 


Urine Color  Straw  (YELLOW)   08/30/18  01:30    


 


Urine Clarity  Clear  (Clear)   08/30/18  01:30    


 


Urine pH  8.0  (5.0-8.0)   08/30/18  01:30    


 


Ur Specific Gravity  1.009  (1.003-1.030)   08/30/18  01:30    


 


Urine Protein  Negative mg/dL (NEGATIVE)   08/30/18  01:30    


 


Urine Glucose (UA)  Neg mg/dL (Normal)   08/30/18  01:30    


 


Urine Ketones  Negative mg/dL (NEGATIVE)   08/30/18  01:30    


 


Urine Blood  Negative  (NEGATIVE)   08/30/18  01:30    


 


Urine Nitrate  Negative  (NEGATIVE)   08/30/18  01:30    


 


Urine Bilirubin  Negative  (NEGATIVE)   08/30/18  01:30    


 


Urine Urobilinogen  0.2-1.0 mg/dL (0.2-1.0)   08/30/18  01:30    


 


Ur Leukocyte Esterase  Neg Delia/uL (Negative)   08/30/18  01:30    


 


Urine RBC (Auto)  1 /hpf (0-3)   08/30/18  01:30    


 


Urine Microscopic WBC  < 1 /hpf (0-5)   08/30/18  01:30    


 


Ur Squamous Epith Cells  1 /hpf (0-5)   08/30/18  01:30    


 


Amorphous Sediment  Rare /ul (<OCC)  H  08/29/18  14:41    


 


Urine Bacteria  Rare  (<OCC)   08/30/18  01:30    


 


Hyaline Casts  0-2 /hpf (0-2)   08/29/18  14:41    














Attending/Attestation





- Attestation


I have personally seen and examined this patient.: Yes


I have fully participated in the care of the patient.: Yes


I have reviewed all pertinent clinical information, including history, physical 

exam and plan: Yes


Notes (Text): 





09/02/18 05:17


Seen, examined, discussed with resident Dr. Thomas. Agree with findings and plan 

as above.

## 2018-09-11 ENCOUNTER — HOSPITAL ENCOUNTER (OUTPATIENT)
Dept: HOSPITAL 14 - H.ER | Age: 48
Setting detail: OBSERVATION
LOS: 1 days | Discharge: HOME | End: 2018-09-12
Attending: HOSPITALIST | Admitting: HOSPITALIST
Payer: MEDICAID

## 2018-09-11 VITALS — BODY MASS INDEX: 25 KG/M2

## 2018-09-11 DIAGNOSIS — C18.9: ICD-10-CM

## 2018-09-11 DIAGNOSIS — G89.3: Primary | ICD-10-CM

## 2018-09-11 DIAGNOSIS — Z88.1: ICD-10-CM

## 2018-09-11 DIAGNOSIS — I10: ICD-10-CM

## 2018-09-11 DIAGNOSIS — F32.9: ICD-10-CM

## 2018-09-11 DIAGNOSIS — Z88.0: ICD-10-CM

## 2018-09-11 DIAGNOSIS — K21.9: ICD-10-CM

## 2018-09-11 DIAGNOSIS — E11.9: ICD-10-CM

## 2018-09-11 DIAGNOSIS — K59.09: ICD-10-CM

## 2018-09-11 DIAGNOSIS — M79.7: ICD-10-CM

## 2018-09-11 DIAGNOSIS — F41.9: ICD-10-CM

## 2018-09-11 DIAGNOSIS — C78.7: ICD-10-CM

## 2018-09-11 DIAGNOSIS — R74.0: ICD-10-CM

## 2018-09-11 LAB
ALBUMIN SERPL-MCNC: 4.8 G/DL (ref 3.5–5)
ALBUMIN/GLOB SERPL: 1.1 {RATIO} (ref 1–2.1)
ALT SERPL-CCNC: 413 U/L (ref 9–52)
AST SERPL-CCNC: 356 U/L (ref 14–36)
BASOPHILS # BLD AUTO: 0.1 K/UL (ref 0–0.2)
BASOPHILS NFR BLD: 0.5 % (ref 0–2)
BUN SERPL-MCNC: 5 MG/DL (ref 7–17)
CALCIUM SERPL-MCNC: 10.5 MG/DL (ref 8.4–10.2)
EOSINOPHIL # BLD AUTO: 0.1 K/UL (ref 0–0.7)
EOSINOPHIL NFR BLD: 0.9 % (ref 0–4)
ERYTHROCYTE [DISTWIDTH] IN BLOOD BY AUTOMATED COUNT: 14.6 % (ref 11.5–14.5)
GFR NON-AFRICAN AMERICAN: > 60
HGB BLD-MCNC: 15 G/DL (ref 12–16)
LYMPHOCYTES # BLD AUTO: 1.6 K/UL (ref 1–4.3)
LYMPHOCYTES NFR BLD AUTO: 14 % (ref 20–40)
MCH RBC QN AUTO: 27.7 PG (ref 27–31)
MCHC RBC AUTO-ENTMCNC: 32.5 G/DL (ref 33–37)
MCV RBC AUTO: 85.3 FL (ref 81–99)
MONOCYTES # BLD: 1 K/UL (ref 0–0.8)
MONOCYTES NFR BLD: 8.6 % (ref 0–10)
NEUTROPHILS # BLD: 8.7 K/UL (ref 1.8–7)
NEUTROPHILS NFR BLD AUTO: 76 % (ref 50–75)
NRBC BLD AUTO-RTO: 0 % (ref 0–0)
PLATELET # BLD: 391 K/UL (ref 130–400)
PMV BLD AUTO: 9.4 FL (ref 7.2–11.7)
RBC # BLD AUTO: 5.4 MIL/UL (ref 3.8–5.2)
WBC # BLD AUTO: 11.4 K/UL (ref 4.8–10.8)

## 2018-09-11 PROCEDURE — 36415 COLL VENOUS BLD VENIPUNCTURE: CPT

## 2018-09-11 PROCEDURE — 74176 CT ABD & PELVIS W/O CONTRAST: CPT

## 2018-09-11 PROCEDURE — 85027 COMPLETE CBC AUTOMATED: CPT

## 2018-09-11 PROCEDURE — 85610 PROTHROMBIN TIME: CPT

## 2018-09-11 PROCEDURE — 85730 THROMBOPLASTIN TIME PARTIAL: CPT

## 2018-09-11 PROCEDURE — 80053 COMPREHEN METABOLIC PANEL: CPT

## 2018-09-11 PROCEDURE — 85025 COMPLETE CBC W/AUTO DIFF WBC: CPT

## 2018-09-11 PROCEDURE — 80048 BASIC METABOLIC PNL TOTAL CA: CPT

## 2018-09-11 PROCEDURE — 96372 THER/PROPH/DIAG INJ SC/IM: CPT

## 2018-09-11 PROCEDURE — 99283 EMERGENCY DEPT VISIT LOW MDM: CPT

## 2018-09-11 PROCEDURE — 82948 REAGENT STRIP/BLOOD GLUCOSE: CPT

## 2018-09-11 PROCEDURE — 96374 THER/PROPH/DIAG INJ IV PUSH: CPT

## 2018-09-11 RX ADMIN — OXYCODONE HYDROCHLORIDE SCH MG: 40 TABLET, FILM COATED, EXTENDED RELEASE ORAL at 22:02

## 2018-09-11 RX ADMIN — Medication SCH MG: at 17:32

## 2018-09-11 NOTE — CP.PCM.HP
History of Present Illness





- History of Present Illness


History of Present Illness: 





49 yo ,f, PM hx/o of HTN, chronic back pain, Colon cancer w/ metastasis to liver

, Depression, Fibromyalgia, GERD was admitted for intractable abdominal pain on 

9/11/18. CT of abdomen and pelvis on 8/29/18 shows heterogeneous diminished 

attenuation in the posterior right hepatic lobe consistent with known colonic 

mass metastatic disease and unchanged in appearance compared to the prior 

examination. Today the patient again presented to the ED with acute on chronic 

bilateral lower abdominal pain similar to previous admission. Her pain 

management physician, Dr. Winn, was consulted. The patient is being admitted for 

severe pain. Denies cp, sob, n/v/d. + Chronic constipation. 





PCP: [facility]


Heme-Oncologist: Dr. Hilton Sandoval at [facility]


Pain management: Dr. Winn


PMH: chronic back pain, fibromyalgia, HTN, GERD, colon cancer, anxiety


PSH: C6 fusion, Colon resection 2015  and b/l oopherectomy, chemoembolization 

of liver.


SocialHx: denies ETOH/tobacco/drug abuse


FamilyHx: DM, HTN


Meds: as per med rec





Present on Admission





- Present on Admission


Any Indicators Present on Admission: No


History of DVT/PE: No


History of Uncontrolled Diabetes: No





Review of Systems





- Review of Systems


Review of Systems: 





A 12 point review of systems was conducted and found to be negative other than 

what was mentioned in the HPI.





Past Patient History





- Infectious Disease


Hx of Infectious Diseases: None





- Tetanus Immunizations


Tetanus Immunization: Unknown





- Past Medical History & Family History


Past Medical History?: Yes





- Past Social History


Smoking Status: Never Smoked





- CARDIAC


Hx Atrial Fibrillation: No


Hx Cardia Arrhythmia: No


Hx Congestive Heart Failure: No


Hx Hypercholesterolemia: No


Hx Hypertension: Yes


Hx Mitral Valve Prolapse: No


Hx Peripheral Edema: No





- PULMONARY


Hx Asthma: No


Hx Bronchitis: No


Hx Chronic Obstructive Pulmonary Disease (COPD): No


Hx Emphysema: No


Hx Pneumonia: No


Hx Pulmonary Embolism: No


Hx Sleep Apnea: No





- NEUROLOGICAL


Hx Alzheimer's Disease: No


Hx Dementia: No


Hx Migraine: No


Hx Multiple Sclerosis: No


Hx Parkinson's Disease: No


Hx Seizures: No


Hx Transient Ischemic Attacks (TIA): No





- HEENT


Hx HEENT Problems: No





- RENAL


Hx Chronic Kidney Disease: No


Hx Kidney Stones: No





- ENDOCRINE/METABOLIC


Hx Hyperthyroidism: No


Hx Hypothyroidism: No





- HEMATOLOGICAL/ONCOLOGICAL


Hx Anemia: No


Hx Human Immunodeficiency Virus (HIV): No


Hx Sickle Cell Disease: No





- INTEGUMENTARY


Hx Dermatological Problems: No





- MUSCULOSKELETAL/RHEUMATOLOGICAL


Hx Arthritis: No


Hx Osteoporosis: No


Hx Rheumatoid Arthritis: No





- GASTROINTESTINAL


Hx Crohn's Disease: No


Hx Diverticulitis: No


Hx Gall Bladder Disease: No


Hx Pancreatitis: No





- GENITOURINARY/GYNECOLOGICAL


Hx Sexually Transmitted Disorders: No





- PSYCHIATRIC


Hx Anxiety: Yes


Hx Bipolar Disorder: No


Hx Depression: Yes


Hx Post Traumatic Stress Disorder: No


Hx Schizophrenia: No


Hx Substance Use: No





- SURGICAL HISTORY


Hx Surgeries: No


Hx Appendectomy: No


Hx Carotid Endarterectomy: No


Hx Cholecystectomy: No


Hx Coronary Artery Bypass Graft: No


Hx Coronary Stent: No


Hx Hysterectomy: Yes


Hx Tonsillectomy: No


Other/Comment: tubal ligation





- ANESTHESIA


Hx Anesthesia: Yes


Hx Anesthesia Reactions: No


Hx Malignant Hyperthermia: No





Meds


Allergies/Adverse Reactions: 


 Allergies











Allergy/AdvReac Type Severity Reaction Status Date / Time


 


penicillin G Allergy  RASH Verified 09/11/18 15:47


 


vancomycin Allergy  RASH Verified 09/11/18 15:47














Physical Exam





- Additional Findings


Additional findings: 








Physical exam:





Constitutional- cooperative, awake, alert


Head- NCAT, PERRL


Eye- PERRL, EOMI


ENT- normal exam, MMM.


Neck- normal inspection, supple, no JVD


Respiratory- CTAB, no wheezes rales rhonchi


Cardiovascular- RRR, +S1, +S2 no MRG


GI/Abdominal- + diffuse tenderness to palpation of the lower abdomen 

bilaterally. normal bowel sounds, soft, no mass, no hsm


Skin- warm, dry


Extremities Exam-normal capillary refill, normal inspection


Neurological Exam- alert, awake, oriented


Psych- normal mood, normal affect





Results





- Vital Signs


Recent Vital Signs: 





 Last Vital Signs











Temp  98 F   09/11/18 15:50


 


Pulse  137 H  09/11/18 15:50


 


Resp  21   09/11/18 15:50


 


BP  120/80   09/11/18 15:47


 


Pulse Ox  98   09/11/18 16:12














- Labs


Result Diagrams: 


 09/11/18 16:12





 09/11/18 16:12


Labs: 





 Laboratory Results - last 24 hr











  09/11/18 09/11/18





  16:12 16:12


 


WBC  11.4 H D 


 


RBC  5.40 H 


 


Hgb  15.0  D 


 


Hct  46.1 


 


MCV  85.3 


 


MCH  27.7 


 


MCHC  32.5 L 


 


RDW  14.6 H 


 


Plt Count  391  D 


 


MPV  9.4 


 


Neut % (Auto)  76.0 H 


 


Lymph % (Auto)  14.0 L 


 


Mono % (Auto)  8.6 


 


Eos % (Auto)  0.9 


 


Baso % (Auto)  0.5 


 


Neut # (Auto)  8.7 H 


 


Lymph # (Auto)  1.6 


 


Mono # (Auto)  1.0 H 


 


Eos # (Auto)  0.1 


 


Baso # (Auto)  0.1 


 


Sodium   133


 


Potassium   4.2


 


Chloride   90 L


 


Carbon Dioxide   29


 


Anion Gap   18


 


BUN   5 L


 


Creatinine   0.5 L


 


Est GFR ( Amer)   > 60


 


Est GFR (Non-Af Amer)   > 60


 


Random Glucose   199 H


 


Calcium   10.5 H


 


Total Bilirubin   2.7 H


 


AST   356 H D


 


ALT   413 H D


 


Alkaline Phosphatase   296 H D


 


Total Protein   9.3 H


 


Albumin   4.8


 


Globulin   4.5 H


 


Albumin/Globulin Ratio   1.1














Assessment & Plan





- Assessment and Plan (Free Text)


Plan: 


49 yo ,f, PM hx/o of HTN, chronic back pain, Colon cancer w/ metastasis to liver

, Depression, Fibromyalgia, GERD was admitted for intractable abdominal pain on 

9/11/18. CT of abdomen and pelvis on 8/29/18 shows heterogeneous diminished 

attenuation in the posterior right hepatic lobe consistent with known colonic 

mass metastatic disease and unchanged in appearance compared to the prior 

examination. Today the patient again presented to the ED with acute on chronic 

bilateral lower abdominal pain similar to previous admission. Her pain 

management physician, Dr. Winn, was consulted. The patient is being admitted for 

severe pain. Denies cp, sob, n/v/d. + Chronc constipation. 








Acute on chronic intractable Abdominal pain and back pain 


- 2/2 metastatic colon Ca


- Chronic, H/O multiple lumbar surgeries. 


- Dilaudid 1.5 IVP q 3 hours for severe pain


- Oxycontin continued at 40 mg po q 12 hours


- Lidoderm patch for back pain


- Zofran PRN vomiting


- Pain management consult on board: Appreciated recs. 





Constipation 


-Dulcolex 


-Docusate 200 mg and senna PO





NIDDM


- Controlled 


- Last A1c 7.7 on 5/28/18


- Low dose sliding scale


- c/w metformin 


- Hypoglycemia protocol 





HTN


-controlled


-Amlodipine 10 mg QD


-Monitor vitals





Fibromyalgia


- Controlled


- Gabapentin 300 mg TID


- Duloxetine 60 mg PO HS





DVT prophylaxis


- Lovenox 40 mg sc daily

## 2018-09-11 NOTE — ED PDOC
HPI: Abdomen


Time Seen by Provider: 09/11/18 15:47


Chief Complaint (Nursing): Abdominal Pain


History Per: Patient


Onset/Duration Of Symptoms: Days (2)


Current Symptoms Are (Timing): Still Present


Severity: Severe


Pain Scale Rating Of: 10


Location Of Pain/Discomfort: Diffuse


Quality Of Discomfort: Unable To Describe


Associated Symptoms: Nausea, Vomiting


Exacerbating Factors: None


Alleviating Factors: None


Additional Complaint(s): 





Diffuse abd pain assoc with nausea and vomiting x 2 days. H/o metastatic colon 

Ca. Taking Oxycontin at home with no relief





Past Medical History


Vital Signs: 


 Last Vital Signs











Temp  98 F   09/11/18 15:50


 


Pulse  137 H  09/11/18 15:50


 


Resp  21   09/11/18 15:50


 


BP  120/80   09/11/18 15:47


 


Pulse Ox  98   09/11/18 16:00














- Medical History


PMH: Anxiety, Back Problems, Depression, Diabetes, Fibromyalgia, GERD, HTN, 

Malignancy


   Denies: Somerset's Disease, Alzheimer's Disease, Anemia, Arthritis, Asthma, 

Atrial Fibrillation, Benign Prostatic Hyperplasia, Bipolar Disorder, Bronchitis

, Cardia Arrhythmia, Cardiac Aneurysm, CHF, Colonic Polyps, COPD, Crohn's 

Disease, Cushing's Syndrome, Dementia, Diverticulitis, Deep Vein Thrombosis, 

Emphysema, Gastrointestinal Ulcer, Gall Bladder Disease, Graves' Disease, 

Hiatal Hernia, HIV, Hypercholesterolemia, Hyperlipidemia, Hyperthyroidism, 

Hypothyroidism, Kidney Stones, Migraine, Mitral Valve Prolapse, Multiple 

Sclerosis, Obstructive Bowel, Osteoporosis, Pancreatitis, Parkinson's Disease, 

Pericarditis, Peripheral Edema, Personality Disorder, Pneumonia, Pneumothorax, 

Post Traumatic Stress Disorder, Pulmonary Embolism, End Stage Renal Disease, 

Chronic Kidney Disease, Rheumatoid Arthritis, Schizophrenia, Seizures, Sickle 

Cell Disease, Sexually Transmitted Disease, Sleep Apnea, TIA





- Surgical History


Surgical History: Back Surgery, Endoscopy


   Denies: Appendectomy, CABG, Carotid Endarterectomy, Cholecystectomy, 

Coronary Stent, Tonsillectomy





- Family History


Family History: States: Unknown Family Hx





- Immunization History


Hx Influenza Vaccination: Yes


Hx Pneumococcal Vaccination: No





- Home Medications


Home Medications: 


 Ambulatory Orders











 Medication  Instructions  Recorded


 


amLODIPine [Norvasc] 10 mg PO DAILY #0 tab 10/12/15


 


DULoxetine [Cymbalta] 60 mg PO HS 10/30/15


 


Omeprazole Magnesium [Prilosec Otc] 40 mg PO DAILY 08/08/18


 


Gabapentin [Neurontin] 300 mg PO Q8 08/29/18


 


metFORMIN [glucOPHAGE] 500 mg PO DAILY 08/29/18


 


Bisacodyl [Dulcolax] 5 mg PO BID  ect 09/01/18


 


HYDROmorphone [Dilaudid] 2 mg PO Q4 #42 tab 09/01/18


 


Lidocaine 5% [Lidoderm] 1 ea TD DAILY #7 patch 09/01/18


 


oxyCODONE [oxyCONTIN Extended 40 mg PO Q12 #14 tabsr 09/01/18





Release Tab]  














- Allergies


Allergies/Adverse Reactions: 


 Allergies











Allergy/AdvReac Type Severity Reaction Status Date / Time


 


penicillin G Allergy  RASH Verified 09/11/18 15:47


 


vancomycin Allergy  RASH Verified 09/11/18 15:47














Review of Systems


ROS Statement: Except As Marked, All Systems Reviewed And Found Negative


Gastrointestinal: Positive for: Nausea, Vomiting, Abdominal Pain





Physical Exam





- Reviewed


Nursing Documentation Reviewed: Yes


Vital Signs Reviewed: Yes





- Physical Exam


Appears: Positive for: Non-toxic, In Acute Distress


Head Exam: Positive for: ATRAUMATIC, NORMAL INSPECTION, NORMOCEPHALIC


Skin: Positive for: Normal Color, Warm, DRY


Eye Exam: Positive for: EOMI, Normal appearance, PERRL


ENT: Positive for: Normal ENT Inspection


Neck: Positive for: Normal, Painless ROM


Cardiovascular/Chest: Positive for: Regular Rate, Rhythm


Respiratory: Positive for: CNT, Normal Breath Sounds


Gastrointestinal/Abdominal: Positive for: Bowel Sounds, Soft, Tenderness (

diffuse).  Negative for: Distended


Back: Positive for: Normal Inspection


Extremity: Positive for: Normal ROM


Neurologic/Psych: Positive for: Alert, Oriented





- ECG


O2 Sat by Pulse Oximetry: 98





Disposition





- Clinical Impression


Clinical Impression: 


 Abdominal pain, Colon cancer metastasized to liver








- Patient ED Disposition


Is Patient to be Admitted: Yes





- Disposition


Disposition Time: 16:12


Condition: FAIR


Forms:  [facility] (English)





- Pt Status Changed To:


Hospital Disposition Of: Observation





- POA


Present On Arrival: None

## 2018-09-11 NOTE — CT
Date of service: 



09/11/2018



PROCEDURE:  CT Abdomen and Pelvis without intravenous contrast



HISTORY:

Abdominal pain back pain. 



History metastatic colon cancer. 



COMPARISON:

07/19/2018 and 08/29/2018 serial CT scans abdomen pelvis.



TECHNIQUE:

Unenhanced study.  Neither oral nor intravenous contrast 

administered. Sensitivity and specificity for acute inflammatory 

processes limited by the absence of oral and intravenous contrast. 



Radiation dose:



Total exam DLP = 614.20 mGy-cm.



This CT exam was performed using one or more of the following dose 

reduction techniques: Automated exposure control, adjustment of the 

mA and/or kV according to patient size, and/or use of iterative 

reconstruction technique.



FINDINGS:



LOWER THORAX:

Trace right pleural effusion, a new finding 



LIVER:

Incompletely characterized hepatic metastatic disease (absence of 

intravenous contrast precludes optimal assessment).  



GALLBLADDER AND BILE DUCTS:

Not visible. Possible contracted gallbladder. 



PANCREAS:

Unremarkable. No gross lesion or ductal dilatation.



SPLEEN:

Unremarkable. 



ADRENALS:

Unremarkable. No mass. 



KIDNEYS AND URETERS:

Unremarkable. No hydronephrosis. No solid mass. 



VASCULATURE:

Unremarkable. No aortic aneurysm. 



BOWEL:

Postoperative changes associate with the descending colon. No 

obstructing lesions identified. Constipation without fecal impaction 

or obstruction.  



APPENDIX:

Unremarkable. Normal appendix. 



PERITONEUM:

Unremarkable. No free fluid. No free air. 



LYMPH NODES:

Stable annalisa hepatis and retroperitoneal lymphadenopathy. 



BLADDER:

Unremarkable. 



REPRODUCTIVE:

Unremarkable. 



BONES:

No acute fracture. No significant interval change compared to the 

prior examination(s). Disc arthroplasty at L4-5 again identified 



OTHER FINDINGS:

None.



IMPRESSION:

No new/ acute findings.



Stable tumor burden.



Additional benign and/or incidental findings described above.

## 2018-09-12 VITALS
OXYGEN SATURATION: 97 % | RESPIRATION RATE: 18 BRPM | TEMPERATURE: 98.4 F | DIASTOLIC BLOOD PRESSURE: 84 MMHG | SYSTOLIC BLOOD PRESSURE: 119 MMHG | HEART RATE: 90 BPM

## 2018-09-12 LAB
APTT BLD: 34.2 SECONDS (ref 25.6–37.1)
BUN SERPL-MCNC: 4 MG/DL (ref 7–17)
CALCIUM SERPL-MCNC: 9.6 MG/DL (ref 8.4–10.2)
ERYTHROCYTE [DISTWIDTH] IN BLOOD BY AUTOMATED COUNT: 14.3 % (ref 11.5–14.5)
GFR NON-AFRICAN AMERICAN: > 60
HGB BLD-MCNC: 13.2 G/DL (ref 12–16)
INR PPP: 1.2
MCH RBC QN AUTO: 28.2 PG (ref 27–31)
MCHC RBC AUTO-ENTMCNC: 33.1 G/DL (ref 33–37)
MCV RBC AUTO: 85.1 FL (ref 81–99)
PLATELET # BLD: 278 K/UL (ref 130–400)
PROTHROMBIN TIME: 13.7 SECONDS (ref 9.8–13.1)
RBC # BLD AUTO: 4.68 MIL/UL (ref 3.8–5.2)
WBC # BLD AUTO: 7.6 K/UL (ref 4.8–10.8)

## 2018-09-12 RX ADMIN — Medication SCH: at 17:33

## 2018-09-12 RX ADMIN — Medication SCH: at 13:48

## 2018-09-12 RX ADMIN — OXYCODONE HYDROCHLORIDE SCH MG: 40 TABLET, FILM COATED, EXTENDED RELEASE ORAL at 08:31

## 2018-09-12 NOTE — CP.PCM.PN
Subjective





- Date & Time of Evaluation


Date of Evaluation: 09/12/18


Time of Evaluation: 08:15





- Subjective


Subjective: 


49 yo woman well known to me re-admitted for intractable abdominal pain.  

According to her the pain is the same as her usual pain, diffuse, over the 

entire abdomen, associated with N/V.  Since her last admission last week, she 

was placed on oral Prednisone.  Otherwise there are no interval changes.  Home 

regimen of Oxycontin 40mg q12h, Dilaudid 2mg PRN along with Neurontin and 

Cymbalta didn't help the pain.  





Compared to previous admissions, her LFT's are significantly elevated this time

, although CT without contrast was essentially the same.  





Plan was to do a possible lower back vs celiac plexus block, but in light of 

the LFT findings, that is put on hold for now.





Objective





- Vital Signs/Intake and Output


Vital Signs (last 24 hours): 


 











Temp Pulse Resp BP Pulse Ox


 


 97.6 F   94 H  18   116/79   97 


 


 09/12/18 00:00  09/12/18 00:00  09/12/18 00:00  09/12/18 00:00  09/12/18 00:00











- Medications


Medications: 


 Current Medications





Amlodipine Besylate (Norvasc)  10 mg PO DAILY [facility]


Bisacodyl (Dulcolax)  10 mg IA DAILY PRN


   PRN Reason: Constipation


Bisacodyl (Dulcolax)  5 mg PO BID [facility]


   Last Admin: 09/11/18 17:32 Dose:  5 mg


Dextrose (Dextrose 50% Inj)  0 ml IV STAT PRN; Protocol


   PRN Reason: Hypoglycemia Protocol


Dextrose (Glutose 15)  0 gm PO ONCE PRN; Protocol


   PRN Reason: Hypoglycemia Protocol


Docusate Sodium (Colace)  200 mg PO BID [facility]


   Last Admin: 09/11/18 17:32 Dose:  200 mg


Duloxetine HCl (Cymbalta)  60 mg PO HS [facility]


   Last Admin: 09/11/18 22:42 Dose:  60 mg


Enoxaparin Sodium (Lovenox)  40 mg SC DAILY [facility]


   PRN Reason: Protocol


Gabapentin (Neurontin)  300 mg PO Q8 [facility]


   Last Admin: 09/12/18 02:47 Dose:  Not Given


Glucagon (Glucagen Diagnostic Kit)  0 mg IM STAT PRN; Protocol


   PRN Reason: Hypoglycemia Protocol


Hydromorphone HCl (Dilaudid)  1.5 mg IVP Q3H PRN


   PRN Reason: Pain, severe (8-10)


   Last Admin: 09/12/18 06:14 Dose:  1.5 mg


Insulin Human Regular (Humulin R)  0 units SC ACCU-CHECK [facility]


   PRN Reason: Protocol


   Last Admin: 09/12/18 06:47 Dose:  Not Given


Lidocaine (Lidoderm)  1 ea TD DAILY [facility]


   Last Admin: 09/11/18 19:03 Dose:  1 ea


Metformin HCl (Glucophage)  500 mg PO DAILY [facility]


Ondansetron HCl (Zofran Inj)  4 mg IVP Q6 PRN


   PRN Reason: Nausea/Vomiting


   Last Admin: 09/12/18 02:44 Dose:  4 mg


Oxycodone HCl (Oxycontin Extended Release Tab)  40 mg PO Q12 [facility]


   Last Admin: 09/11/18 22:02 Dose:  40 mg


Pantoprazole Sodium (Protonix Ec Tab)  40 mg PO DAILY [facility]


Sennosides (Senokot Tab)  17.2 mg PO HS [facility]


   Last Admin: 09/11/18 22:08 Dose:  Not Given











- Labs


Labs: 


 





 09/12/18 05:30 





 09/12/18 05:30 











- GI/Abdominal Exam


GI & Abdominal Exam: Soft, Tenderness





Assessment and Plan


(1) Abdominal pain


Assessment & Plan: 


49 yo woman w/ metastatic colon cancer to liver.  Pain has been intractable 

since chemembolization.  LFT's increased significantly on this admission.





- procedure on hold, check coag's


- GI consult first


- procedure possibly on Friday if LFT's return to her baseline


- continue current regimen


Status: Acute

## 2018-09-12 NOTE — CP.PCM.DIS
Provider





- Provider


Date of Admission: 


09/11/18 16:22





Attending physician: 


Juan Manuel Grant DO





Time Spent in preparation of Discharge (in minutes): 35





Hospital Course





- Lab Results


Lab Results: 


 Most Recent Lab Values











WBC  7.6 K/uL (4.8-10.8)   09/12/18  05:30    


 


RBC  4.68 Mil/uL (3.80-5.20)   09/12/18  05:30    


 


Hgb  13.2 g/dL (12.0-16.0)   09/12/18  05:30    


 


Hct  39.9 % (34.0-47.0)   09/12/18  05:30    


 


MCV  85.1 fl (81.0-99.0)   09/12/18  05:30    


 


MCH  28.2 pg (27.0-31.0)   09/12/18  05:30    


 


MCHC  33.1 g/dL (33.0-37.0)   09/12/18  05:30    


 


RDW  14.3 % (11.5-14.5)   09/12/18  05:30    


 


Plt Count  278 K/uL (130-400)  D 09/12/18  05:30    


 


MPV  9.4 fl (7.2-11.7)   09/11/18  16:12    


 


Neut % (Auto)  76.0 % (50.0-75.0)  H  09/11/18  16:12    


 


Lymph % (Auto)  14.0 % (20.0-40.0)  L  09/11/18  16:12    


 


Mono % (Auto)  8.6 % (0.0-10.0)   09/11/18  16:12    


 


Eos % (Auto)  0.9 % (0.0-4.0)   09/11/18  16:12    


 


Baso % (Auto)  0.5 % (0.0-2.0)   09/11/18  16:12    


 


Neut # (Auto)  8.7 K/uL (1.8-7.0)  H  09/11/18  16:12    


 


Lymph # (Auto)  1.6 K/uL (1.0-4.3)   09/11/18  16:12    


 


Mono # (Auto)  1.0 K/uL (0.0-0.8)  H  09/11/18  16:12    


 


Eos # (Auto)  0.1 K/uL (0.0-0.7)   09/11/18  16:12    


 


Baso # (Auto)  0.1 K/uL (0.0-0.2)   09/11/18  16:12    


 


PT  13.7 Seconds (9.8-13.1)  H  09/12/18  08:30    


 


INR  1.2   09/12/18  08:30    


 


APTT  34.2 Seconds (25.6-37.1)   09/12/18  08:30    


 


Sodium  134 mmol/l (132-148)   09/12/18  05:30    


 


Potassium  3.8 MMOL/L (3.6-5.0)   09/12/18  05:30    


 


Chloride  96 mmol/L ()  L  09/12/18  05:30    


 


Carbon Dioxide  28 mmol/L (22-30)   09/12/18  05:30    


 


Anion Gap  14  (10-20)   09/12/18  05:30    


 


BUN  4 mg/dl (7-17)  L  09/12/18  05:30    


 


Creatinine  0.3 mg/dl (0.7-1.2)  L  09/12/18  05:30    


 


Est GFR ( Amer)  > 60   09/12/18  05:30    


 


Est GFR (Non-Af Amer)  > 60   09/12/18  05:30    


 


POC Glucose (mg/dL)  157 mg/dL ()  H  09/12/18  10:46    


 


Random Glucose  145 mg/dL ()  H  09/12/18  05:30    


 


Calcium  9.6 mg/dL (8.4-10.2)   09/12/18  05:30    


 


Total Bilirubin  2.7 mg/dl (0.2-1.3)  H  09/11/18  16:12    


 


AST  356 U/L (14-36)  H D 09/11/18  16:12    


 


ALT  413 U/L (9-52)  H D 09/11/18  16:12    


 


Alkaline Phosphatase  296 U/L ()  H D 09/11/18  16:12    


 


Total Protein  9.3 G/DL (6.3-8.2)  H  09/11/18  16:12    


 


Albumin  4.8 g/dL (3.5-5.0)   09/11/18  16:12    


 


Globulin  4.5 gm/dL (2.2-3.9)  H  09/11/18  16:12    


 


Albumin/Globulin Ratio  1.1  (1.0-2.1)   09/11/18  16:12    














- Hospital Course


Hospital Course: 


48 year old female with PMH of HTN, chronic back pain, Colon cancer w/ mets to 

liver, chemembolization, Depression, Fibromyalgia, and GERD was admitted for 

intractable abdominal pain. Prior CT of abdomen and pelvis (8/29/18) shows 

heterogeneous diminished attenuation in the posterior right hepatic lobe 

consistent with known colonic mass metastatic disease and unchanged in 

appearance compared to the prior examination. Today the patient again presented 

to the ED with acute on chronic bilateral lower abdominal pain similar to 

previous admission. 





The patient was admitted for severe pain. Her pain management physician, Dr. Winn

, was consulted and he planned to do a possible lower back vs celiac plexus 

block, but due to abnormal LFT results (tBili 2.7, , , Alk Phos 

296) nerve block was put on hold.





Patient's pain was controlled with the same pain management regimen she follows 

at home (Oxycontin 40mg q12h, Dilaudid 2mg Q3, Neurontin and Cymbalta). She is 

medically stable for discharge to home and states that she will follow-up with 

her primary care team at [facility] where she has been chronically followed.





Discharge Exam





- Head Exam


Head Exam: ATRAUMATIC, NORMAL INSPECTION, NORMOCEPHALIC





- Eye Exam


Eye Exam: Normal appearance


Pupil Exam: NORMAL ACCOMODATION





- ENT Exam


ENT Exam: Mucous Membranes Moist





- Neck Exam


Neck exam: Normal Inspection





- Respiratory Exam


Respiratory Exam: NORMAL BREATHING PATTERN, UNREMARKABLE





- Cardiovascular Exam


Cardiovascular Exam: REGULAR RHYTHM





- GI/Abdominal Exam


GI & Abdominal Exam: Tenderness (diffuse LRQ, LLQ, suprapubic).  absent: 

Guarding, Mass, Rebound





- Extremities Exam


Extremities exam: normal inspection





- Back Exam


Back exam: tenderness (lower back)





- Neurological Exam


Neurological exam: Alert, Normal Gait, Oriented x3





- Psychiatric Exam


Psychiatric exam: Normal Affect, Normal Mood





- Skin


Skin Exam: Normal Color, Warm





Discharge Plan





- Follow Up Plan


Condition: FAIR


Disposition: HOME/ ROUTINE


Additional Instructions: 


Follow up with primary care provider at [facility]. Follow up with Dr Winn 

within 1 week. 


Referrals: 


Luciano Winn-Berhane Clifton MD [Staff Provider] -

## 2018-09-12 NOTE — CP.PCM.PN
Subjective





- Date & Time of Evaluation


Date of Evaluation: 09/12/18


Time of Evaluation: 11:10





Objective





- Vital Signs/Intake and Output


Vital Signs (last 24 hours): 


 











Temp Pulse Resp BP Pulse Ox


 


 97.5 F L  83   19   117/81   96 


 


 09/12/18 08:15  09/12/18 08:15  09/12/18 08:15  09/12/18 08:15  09/12/18 08:15











- Medications


Medications: 


 Current Medications





Amlodipine Besylate (Norvasc)  10 mg PO DAILY [facility]


Bisacodyl (Dulcolax)  10 mg [facility] DAILY PRN


   PRN Reason: Constipation


Bisacodyl (Dulcolax)  5 mg PO BID [facility]


   Last Admin: 09/11/18 17:32 Dose:  5 mg


Dextrose (Dextrose 50% Inj)  0 ml IV STAT PRN; Protocol


   PRN Reason: Hypoglycemia Protocol


Dextrose (Glutose 15)  0 gm PO ONCE PRN; Protocol


   PRN Reason: Hypoglycemia Protocol


Docusate Sodium (Colace)  200 mg PO BID [facility]


   Last Admin: 09/12/18 08:33 Dose:  200 mg


Duloxetine HCl (Cymbalta)  60 mg PO HS [facility]


   Last Admin: 09/11/18 22:42 Dose:  60 mg


Enoxaparin Sodium (Lovenox)  40 mg SC DAILY [facility]


   PRN Reason: Protocol


Gabapentin (Neurontin)  300 mg PO Q8 [facility]


   Last Admin: 09/12/18 08:32 Dose:  300 mg


Glucagon (Glucagen Diagnostic Kit)  0 mg IM STAT PRN; Protocol


   PRN Reason: Hypoglycemia Protocol


Hydromorphone HCl (Dilaudid)  2 mg IVP Q3 PRN


   PRN Reason: Pain, severe (8-10)


Insulin Human Regular (Humulin R)  0 units SC ACCU-CHECK [facility]


   PRN Reason: Protocol


   Last Admin: 09/12/18 06:47 Dose:  Not Given


Lidocaine (Lidoderm)  1 ea TD DAILY [facility]


   Last Admin: 09/12/18 08:31 Dose:  1 ea


Metformin HCl (Glucophage)  500 mg PO DAILY [facility]


Ondansetron HCl (Zofran Inj)  4 mg IVP Q6 PRN


   PRN Reason: Nausea/Vomiting


   Last Admin: 09/12/18 02:44 Dose:  4 mg


Oxycodone HCl (Oxycontin Extended Release Tab)  40 mg PO Q12 [facility]


   Last Admin: 09/12/18 08:31 Dose:  40 mg


Pantoprazole Sodium (Protonix Ec Tab)  40 mg PO DAILY [facility]


   Last Admin: 09/12/18 08:32 Dose:  40 mg


Sennosides (Senokot Tab)  17.2 mg PO HS [facility]


   Last Admin: 09/11/18 22:08 Dose:  Not Given











- Labs


Labs: 


 





 09/12/18 05:30 





 09/12/18 05:30 





 











PT  13.7 Seconds (9.8-13.1)  H  09/12/18  08:30    


 


INR  1.2   09/12/18  08:30    


 


APTT  34.2 Seconds (25.6-37.1)   09/12/18  08:30

## 2018-10-12 ENCOUNTER — HOSPITAL ENCOUNTER (INPATIENT)
Dept: HOSPITAL 14 - H.ER | Age: 48
LOS: 15 days | Discharge: HOME HEALTH | DRG: 552 | End: 2018-10-27
Attending: GENERAL ACUTE CARE HOSPITAL | Admitting: GENERAL ACUTE CARE HOSPITAL
Payer: MEDICAID

## 2018-10-12 ENCOUNTER — HOSPITAL ENCOUNTER (EMERGENCY)
Dept: HOSPITAL 14 - H.ER | Age: 48
Discharge: HOME | End: 2018-10-12
Payer: MEDICAID

## 2018-10-12 VITALS — BODY MASS INDEX: 25 KG/M2

## 2018-10-12 VITALS
OXYGEN SATURATION: 95 % | SYSTOLIC BLOOD PRESSURE: 106 MMHG | HEART RATE: 80 BPM | DIASTOLIC BLOOD PRESSURE: 62 MMHG | RESPIRATION RATE: 14 BRPM

## 2018-10-12 VITALS — TEMPERATURE: 97.5 F

## 2018-10-12 DIAGNOSIS — Z79.899: ICD-10-CM

## 2018-10-12 DIAGNOSIS — F41.9: ICD-10-CM

## 2018-10-12 DIAGNOSIS — Z88.0: ICD-10-CM

## 2018-10-12 DIAGNOSIS — M79.7: ICD-10-CM

## 2018-10-12 DIAGNOSIS — Z92.21: ICD-10-CM

## 2018-10-12 DIAGNOSIS — R10.9: Primary | ICD-10-CM

## 2018-10-12 DIAGNOSIS — C78.7: ICD-10-CM

## 2018-10-12 DIAGNOSIS — Z98.1: ICD-10-CM

## 2018-10-12 DIAGNOSIS — C18.9: ICD-10-CM

## 2018-10-12 DIAGNOSIS — G89.29: ICD-10-CM

## 2018-10-12 DIAGNOSIS — F32.9: ICD-10-CM

## 2018-10-12 DIAGNOSIS — E87.6: ICD-10-CM

## 2018-10-12 DIAGNOSIS — Z79.891: ICD-10-CM

## 2018-10-12 DIAGNOSIS — I10: ICD-10-CM

## 2018-10-12 DIAGNOSIS — Z79.84: ICD-10-CM

## 2018-10-12 DIAGNOSIS — C79.60: ICD-10-CM

## 2018-10-12 DIAGNOSIS — B37.2: ICD-10-CM

## 2018-10-12 DIAGNOSIS — K21.9: ICD-10-CM

## 2018-10-12 DIAGNOSIS — K56.690: ICD-10-CM

## 2018-10-12 DIAGNOSIS — E86.0: ICD-10-CM

## 2018-10-12 DIAGNOSIS — R59.0: ICD-10-CM

## 2018-10-12 DIAGNOSIS — M54.9: ICD-10-CM

## 2018-10-12 DIAGNOSIS — E11.9: ICD-10-CM

## 2018-10-12 DIAGNOSIS — K46.0: Primary | ICD-10-CM

## 2018-10-12 DIAGNOSIS — Z90.49: ICD-10-CM

## 2018-10-12 DIAGNOSIS — M19.90: ICD-10-CM

## 2018-10-12 DIAGNOSIS — J02.9: ICD-10-CM

## 2018-10-12 LAB
ALBUMIN SERPL-MCNC: 4.4 G/DL (ref 3.5–5)
ALBUMIN SERPL-MCNC: 4.5 G/DL (ref 3.5–5)
ALBUMIN/GLOB SERPL: 1 {RATIO} (ref 1–2.1)
ALBUMIN/GLOB SERPL: 1 {RATIO} (ref 1–2.1)
ALT SERPL-CCNC: 35 U/L (ref 9–52)
ALT SERPL-CCNC: 52 U/L (ref 9–52)
APTT BLD: 30.1 SECONDS (ref 25.6–37.1)
ARTERIAL BLOOD GAS O2 SAT: 92.8 % (ref 95–98)
ARTERIAL BLOOD GAS PCO2: 44 MM/HG (ref 35–45)
ARTERIAL BLOOD GAS TCO2: 29.9 MMOL/L (ref 22–28)
ARTERIAL PATENCY WRIST A: YES
AST SERPL-CCNC: 41 U/L (ref 14–36)
AST SERPL-CCNC: 44 U/L (ref 14–36)
BASOPHILS # BLD AUTO: 0 K/UL (ref 0–0.2)
BASOPHILS # BLD AUTO: 0 K/UL (ref 0–0.2)
BASOPHILS NFR BLD: 0.1 % (ref 0–2)
BASOPHILS NFR BLD: 0.2 % (ref 0–2)
BILIRUB UR-MCNC: NEGATIVE MG/DL
BUN SERPL-MCNC: 5 MG/DL (ref 7–17)
BUN SERPL-MCNC: 6 MG/DL (ref 7–17)
CALCIUM SERPL-MCNC: 10.2 MG/DL (ref 8.4–10.2)
CALCIUM SERPL-MCNC: 10.5 MG/DL (ref 8.4–10.2)
COLOR UR: YELLOW
EOSINOPHIL # BLD AUTO: 0 K/UL (ref 0–0.7)
EOSINOPHIL # BLD AUTO: 0 K/UL (ref 0–0.7)
EOSINOPHIL NFR BLD: 0 % (ref 0–4)
EOSINOPHIL NFR BLD: 0.6 % (ref 0–4)
ERYTHROCYTE [DISTWIDTH] IN BLOOD BY AUTOMATED COUNT: 15.1 % (ref 11.5–14.5)
ERYTHROCYTE [DISTWIDTH] IN BLOOD BY AUTOMATED COUNT: 15.1 % (ref 11.5–14.5)
GFR NON-AFRICAN AMERICAN: > 60
GFR NON-AFRICAN AMERICAN: > 60
GLUCOSE UR STRIP-MCNC: 50 MG/DL
HCO3 BLDA-SCNC: 27.3 MMOL/L (ref 21–28)
HGB BLD-MCNC: 13.7 G/DL (ref 12–16)
HGB BLD-MCNC: 14 G/DL (ref 12–16)
INHALED O2 CONCENTRATION: 21 %
INR PPP: 1.2
LEUKOCYTE ESTERASE UR-ACNC: (no result) LEU/UL
LIPASE SERPL-CCNC: 38 U/L (ref 23–300)
LIPASE SERPL-CCNC: 40 U/L (ref 23–300)
LYMPHOCYTES # BLD AUTO: 0.8 K/UL (ref 1–4.3)
LYMPHOCYTES # BLD AUTO: 1.7 K/UL (ref 1–4.3)
LYMPHOCYTES NFR BLD AUTO: 14.9 % (ref 20–40)
LYMPHOCYTES NFR BLD AUTO: 22.7 % (ref 20–40)
MCH RBC QN AUTO: 27.8 PG (ref 27–31)
MCH RBC QN AUTO: 28.1 PG (ref 27–31)
MCHC RBC AUTO-ENTMCNC: 33.4 G/DL (ref 33–37)
MCHC RBC AUTO-ENTMCNC: 33.5 G/DL (ref 33–37)
MCV RBC AUTO: 83.3 FL (ref 81–99)
MCV RBC AUTO: 84 FL (ref 81–99)
MONOCYTES # BLD: 0.4 K/UL (ref 0–0.8)
MONOCYTES # BLD: 0.8 K/UL (ref 0–0.8)
MONOCYTES NFR BLD: 10.5 % (ref 0–10)
MONOCYTES NFR BLD: 7.2 % (ref 0–10)
NEUTROPHILS # BLD: 4 K/UL (ref 1.8–7)
NEUTROPHILS # BLD: 4.8 K/UL (ref 1.8–7)
NEUTROPHILS NFR BLD AUTO: 66 % (ref 50–75)
NEUTROPHILS NFR BLD AUTO: 77.8 % (ref 50–75)
NRBC BLD AUTO-RTO: 0 % (ref 0–0)
NRBC BLD AUTO-RTO: 0.1 % (ref 0–0)
PH BLDA: 7.42 [PH] (ref 7.35–7.45)
PH UR STRIP: 7 [PH] (ref 5–8)
PLATELET # BLD: 265 K/UL (ref 130–400)
PLATELET # BLD: 301 K/UL (ref 130–400)
PMV BLD AUTO: 8.9 FL (ref 7.2–11.7)
PMV BLD AUTO: 9.2 FL (ref 7.2–11.7)
PO2 BLDA: 55 MM/HG (ref 80–100)
PROT UR STRIP-MCNC: NEGATIVE MG/DL
PROTHROMBIN TIME: 13.8 SECONDS (ref 9.8–13.1)
RBC # BLD AUTO: 4.87 MIL/UL (ref 3.8–5.2)
RBC # BLD AUTO: 5.01 MIL/UL (ref 3.8–5.2)
RBC # UR STRIP: NEGATIVE /UL
SP GR UR STRIP: 1.01 (ref 1–1.03)
SQUAMOUS EPITHIAL: 2 /HPF (ref 0–5)
URINE CLARITY: CLEAR
UROBILINOGEN UR-MCNC: (no result) MG/DL (ref 0.2–1)
WBC # BLD AUTO: 5.2 K/UL (ref 4.8–10.8)
WBC # BLD AUTO: 7.3 K/UL (ref 4.8–10.8)

## 2018-10-12 PROCEDURE — 83735 ASSAY OF MAGNESIUM: CPT

## 2018-10-12 PROCEDURE — 93005 ELECTROCARDIOGRAM TRACING: CPT

## 2018-10-12 PROCEDURE — 96361 HYDRATE IV INFUSION ADD-ON: CPT

## 2018-10-12 PROCEDURE — 80053 COMPREHEN METABOLIC PANEL: CPT

## 2018-10-12 PROCEDURE — 99285 EMERGENCY DEPT VISIT HI MDM: CPT

## 2018-10-12 PROCEDURE — 96375 TX/PRO/DX INJ NEW DRUG ADDON: CPT

## 2018-10-12 PROCEDURE — 83690 ASSAY OF LIPASE: CPT

## 2018-10-12 PROCEDURE — 85025 COMPLETE CBC W/AUTO DIFF WBC: CPT

## 2018-10-12 PROCEDURE — 96376 TX/PRO/DX INJ SAME DRUG ADON: CPT

## 2018-10-12 PROCEDURE — 81003 URINALYSIS AUTO W/O SCOPE: CPT

## 2018-10-12 PROCEDURE — 96374 THER/PROPH/DIAG INJ IV PUSH: CPT

## 2018-10-12 PROCEDURE — 81025 URINE PREGNANCY TEST: CPT

## 2018-10-12 RX ADMIN — INSULIN LISPRO SCH: 100 INJECTION, SOLUTION INTRAVENOUS; SUBCUTANEOUS at 23:03

## 2018-10-12 RX ADMIN — DEXTROSE AND SODIUM CHLORIDE SCH MLS/HR: 5; 450 INJECTION, SOLUTION INTRAVENOUS at 21:08

## 2018-10-12 RX ADMIN — DEXTROSE AND SODIUM CHLORIDE SCH MLS/HR: 5; 450 INJECTION, SOLUTION INTRAVENOUS at 16:53

## 2018-10-12 RX ADMIN — POTASSIUM PHOSPHATE, MONOBASIC AND POTASSIUM PHOSPHATE, DIBASIC SCH MLS/HR: 224; 236 INJECTION, SOLUTION, CONCENTRATE INTRAVENOUS at 21:00

## 2018-10-12 RX ADMIN — POTASSIUM PHOSPHATE, MONOBASIC AND POTASSIUM PHOSPHATE, DIBASIC SCH MLS/HR: 224; 236 INJECTION, SOLUTION, CONCENTRATE INTRAVENOUS at 18:56

## 2018-10-12 RX ADMIN — INSULIN LISPRO SCH: 100 INJECTION, SOLUTION INTRAVENOUS; SUBCUTANEOUS at 18:25

## 2018-10-12 NOTE — CP.PCM.CON
History of Present Illness





- History of Present Illness


History of Present Illness: 





Surgery: Dr. Velarde





CC: Abd pain





HPI: 48F w. hx of colon CA w. mets to liver and ovaries received neoadjuvant 

chemo in 2016 with subsequent robotic L colectomy and oopherectomy. Liver 

resection was attempted but had to be aborted due to bleeding prompting pt to 

receive TACE.  Pt presents with acute onset of abdominal pain which started 

yesterday morning. The pain is constant and has been increasing in severity 

despite taking dilaudid 2mg PO q2 PRN and oxycontin 60mg PO TID. Pain is 

diffuse. Pain has been accompanied by multiple episodes of N/V. Pt is passing 

flatus. Last BM was yesterday. She reports subjective fevers. CT scan done in ED

showed SBO in LLQ with probable internal hernia.





PMH: Colon CA w. mets to liver and ovaries, HTN, DM


PSH: Robotic L colectomy w. oopherectomy, attempted liver resection, Cervical 

and lumbar fusion


Meds: MAR reviewed


ALL: PCN, vanco > rash


Social: No ETOH/tobacco/drugs


Fhx: Non-contributory





Review of Systems





- Review of Systems


All systems: reviewed and no additional remarkable complaints except (HPI)





Past Patient History





- Infectious Disease


Hx of Infectious Diseases: None





- Tetanus Immunizations


Tetanus Immunization: Unknown





- Past Medical History & Family History


Past Medical History?: Yes





- Past Social History


Smoking Status: Never Smoked





- CARDIAC


Hx Atrial Fibrillation: No


Hx Cardia Arrhythmia: No


Hx Congestive Heart Failure: No


Hx Hypercholesterolemia: No


Hx Hypertension: Yes


Hx Mitral Valve Prolapse: No


Hx Peripheral Edema: No





- PULMONARY


Hx Asthma: No


Hx Bronchitis: No


Hx Chronic Obstructive Pulmonary Disease (COPD): No


Hx Emphysema: No


Hx Pneumonia: No


Hx Pulmonary Embolism: No


Hx Sleep Apnea: No





- NEUROLOGICAL


Hx Alzheimer's Disease: No


Hx Dementia: No


Hx Migraine: No


Hx Multiple Sclerosis: No


Hx Parkinson's Disease: No


Hx Seizures: No


Hx Transient Ischemic Attacks (TIA): No





- HEENT


Hx HEENT Problems: No





- RENAL


Hx Chronic Kidney Disease: No


Hx Kidney Stones: No





- ENDOCRINE/METABOLIC


Hx Hyperthyroidism: No


Hx Hypothyroidism: No





- HEMATOLOGICAL/ONCOLOGICAL


Hx Anemia: No


Hx Human Immunodeficiency Virus (HIV): No


Hx Sickle Cell Disease: No





- INTEGUMENTARY


Hx Dermatological Problems: No





- MUSCULOSKELETAL/RHEUMATOLOGICAL


Hx Arthritis: Yes


Hx Osteoporosis: No


Hx Rheumatoid Arthritis: No





- GASTROINTESTINAL


Hx Crohn's Disease: No


Hx Diverticulitis: No


Hx Gall Bladder Disease: No


Hx Pancreatitis: No





- GENITOURINARY/GYNECOLOGICAL


Hx Sexually Transmitted Disorders: No





- PSYCHIATRIC


Hx Anxiety: Yes


Hx Bipolar Disorder: No


Hx Depression: Yes


Hx Post Traumatic Stress Disorder: No


Hx Schizophrenia: No





- SURGICAL HISTORY


Hx Appendectomy: No


Hx Carotid Endarterectomy: No


Hx Cholecystectomy: No


Hx Coronary Artery Bypass Graft: No


Hx Coronary Stent: No


Hx Tonsillectomy: No





- ANESTHESIA


Hx Anesthesia: Yes


Hx Anesthesia Reactions: No


Hx Malignant Hyperthermia: No





Meds


Allergies/Adverse Reactions: 


                                    Allergies











Allergy/AdvReac Type Severity Reaction Status Date / Time


 


penicillin G Allergy  RASH Verified 10/12/18 12:23


 


vancomycin Allergy  RASH Verified 10/12/18 12:23














- Medications


Medications: 


                               Current Medications





Dextrose (Dextrose 50% Inj)  0 ml IV STAT PRN; Protocol


   PRN Reason: Hypoglycemia Protocol


Dextrose (Glutose 15)  0 gm PO ONCE PRN; Protocol


   PRN Reason: Hypoglycemia Protocol


Glucagon (Glucagen Diagnostic Kit)  0 mg IM STAT PRN; Protocol


   PRN Reason: Hypoglycemia Protocol


Hydromorphone HCl (Dilaudid)  2 mg IVP Q4 PRN


   PRN Reason: Pain, severe (8-10)


Dextrose/Sodium Chloride (Dextrose 5%/0.45% Ns 1000 Ml)  1,000 mls @ 200 mls/hr 

IV .Q5H JAM


   Last Admin: 10/12/18 16:53 Dose:  200 mls/hr


Insulin Human Lispro (Humalog)  0 units SC ACCU-CHECK JAM; Protocol


Morphine Sulfate (Morphine)  2 mg IVP Q4 PRN


   PRN Reason: Pain, moderate (4-7)


Morphine Sulfate (Morphine)  1 mg IVP Q4 PRN


   PRN Reason: Pain, Mild (1-3)


Ondansetron HCl (Zofran Inj)  2 mg IVP Q6 PRN


   PRN Reason: Nausea/Vomiting


   Last Admin: 10/12/18 17:03 Dose:  2 mg











Physical Exam





- Constitutional


Appears: Other (uncomfortable)





- Head Exam


Head Exam: ATRAUMATIC, NORMOCEPHALIC





- Eye Exam


Eye Exam: EOMI





- ENT Exam


ENT Exam: Mucous Membranes Dry





- Neck Exam


Neck exam: Positive for: Full Rom





- Respiratory Exam


Respiratory Exam: NORMAL BREATHING PATTERN.  absent: Accessory Muscle Use, 

Respiratory Distress





- Cardiovascular Exam


Cardiovascular Exam: Tachycardia





- GI/Abdominal Exam


GI & Abdominal Exam: Rebound (LLQ, RUQ), Soft, Tenderness (LLQ).  absent: 

Distended, Firm, Guarding, Rigid


Additional comments: 





Kocher incision, LLQ transverse incision





- Extremities Exam


Extremities exam: Positive for: pedal pulses present.  Negative for: calf 

tenderness, pedal edema





- Neurological Exam


Neurological exam: Alert, Oriented x3





- Psychiatric Exam


Psychiatric exam: Normal Affect, Normal Mood





Results





- Vital Signs


Recent Vital Signs: 


                                Last Vital Signs











Temp  96 F L  10/12/18 12:24


 


Pulse  93 H  10/12/18 17:01


 


Resp  16   10/12/18 17:01


 


BP  120/91 H  10/12/18 17:01


 


Pulse Ox  100   10/12/18 17:01














- Labs


Result Diagrams: 


                                 10/12/18 12:50





                                 10/12/18 12:50


Labs: 


                         Laboratory Results - last 24 hr











  10/12/18 10/12/18





  12:50 12:50


 


WBC  7.3 


 


RBC  5.01 


 


Hgb  14.0 


 


Hct  41.8 


 


MCV  83.3 


 


MCH  27.8 


 


MCHC  33.4 


 


RDW  15.1 H 


 


Plt Count  301 


 


MPV  8.9 


 


Neut % (Auto)  66.0 


 


Lymph % (Auto)  22.7 


 


Mono % (Auto)  10.5 H 


 


Eos % (Auto)  0.6 


 


Baso % (Auto)  0.2 


 


Neut # (Auto)  4.8 


 


Lymph # (Auto)  1.7 


 


Mono # (Auto)  0.8 


 


Eos # (Auto)  0.0 


 


Baso # (Auto)  0.0 


 


Sodium   138


 


Potassium   3.1 L


 


Chloride   99


 


Carbon Dioxide   27


 


Anion Gap   15


 


BUN   5 L


 


Creatinine   0.4 L


 


Est GFR ( Amer)   > 60


 


Est GFR (Non-Af Amer)   > 60


 


Random Glucose   199 H


 


Calcium   10.5 H


 


Total Bilirubin   0.6


 


AST   44 H


 


ALT   52  D


 


Alkaline Phosphatase   209 H


 


Total Protein   8.8 H


 


Albumin   4.5


 


Globulin   4.3 H


 


Albumin/Globulin Ratio   1.0


 


Lipase   40














- Imaging and Cardiology


  ** CT scan - abdomen


Status: Image reviewed by me, Report reviewed by me





  ** Chest x-ray


Status: Image reviewed by me





Assessment & Plan





- Assessment and Plan (Free Text)


Assessment: 





48F w. hx of metastatic colon CA s/p L colectomy, oopherectomy, attempted Liver 

resection now w. SBO 2/2 questionable internal hernia


-NPO


-NGT low intermittent suction


-IVF


-Strict Is:Os


-Serial abd exams


-Explained to pt that failure of pain to resolve or exacerbation of pain will 

likely require surgical intervention


-will follow closely


-d/w attending





Florinda PGY4

## 2018-10-12 NOTE — ED PDOC
HPI: Abdomen


Time Seen by Provider: 10/12/18 12:28


Chief Complaint (Nursing): Abdominal Pain





Past Medical History


Vital Signs: 





                                Last Vital Signs











Temp  96 F L  10/12/18 12:24


 


Pulse  127 H  10/12/18 12:24


 


Resp  20   10/12/18 12:24


 


BP  143/97 H  10/12/18 12:24


 


Pulse Ox  99   10/12/18 12:24














- Medical History


PMH: Anxiety, Arthritis, Back Problems, Depression, Diabetes, Fibromyalgia, 

GERD, HTN, Malignancy


   Denies: Webster's Disease, Alzheimer's Disease, Anemia, Asthma, Atrial 

Fibrillation, Benign Prostatic Hyperplasia, Bipolar Disorder, Bronchitis, Cardia

Arrhythmia, Cardiac Aneurysm, CHF, Colonic Polyps, COPD, Crohn's Disease, 

Cushing's Syndrome, Dementia, Diverticulitis, Deep Vein Thrombosis, Emphysema, 

Gastrointestinal Ulcer, Gall Bladder Disease, Graves' Disease, Hiatal Hernia, 

HIV, Hypercholesterolemia, Hyperlipidemia, Hyperthyroidism, Hypothyroidism, 

Kidney Stones, Migraine, Mitral Valve Prolapse, Multiple Sclerosis, Obstructive 

Bowel, Osteoporosis, Pancreatitis, Parkinson's Disease, Pericarditis, Peripheral

Edema, Personality Disorder, Pneumonia, Pneumothorax, Post Traumatic Stress D

isorder, Pulmonary Embolism, End Stage Renal Disease, Chronic Kidney Disease, 

Rheumatoid Arthritis, Schizophrenia, Seizures, Sickle Cell Disease, Sexually 

Transmitted Disease, Sleep Apnea, TIA





- Surgical History


Surgical History: Back Surgery, Endoscopy


   Denies: Appendectomy, CABG, Carotid Endarterectomy, Cholecystectomy, Coronary

Stent, Tonsillectomy





- Family History


Family History: States: Unknown Family Hx





- Immunization History


Hx Influenza Vaccination: Yes


Hx Pneumococcal Vaccination: No





- Home Medications


Home Medications: 


                                Ambulatory Orders











 Medication  Instructions  Recorded


 


amLODIPine [Norvasc] 10 mg PO DAILY #0 tab 10/12/15


 


DULoxetine [Cymbalta] 60 mg PO HS 10/30/15


 


Omeprazole Magnesium [Prilosec Otc] 40 mg PO DAILY 08/08/18


 


Gabapentin [Neurontin] 300 mg PO Q8 08/29/18


 


metFORMIN [glucOPHAGE] 500 mg PO DAILY 08/29/18


 


Bisacodyl [Dulcolax] 5 mg PO BID  ect 09/01/18


 


HYDROmorphone [Dilaudid] 2 mg PO Q4 #42 tab 09/01/18


 


oxyCODONE [oxyCONTIN Extended 40 mg PO Q12 #14 tabsr 09/01/18





Release Tab]  


 


Methylprednisolone [Medrol Dose 4 mg PO DAILY 09/12/18





Pack (21 tabs)]  














- Allergies


Allergies/Adverse Reactions: 


                                    Allergies











Allergy/AdvReac Type Severity Reaction Status Date / Time


 


penicillin G Allergy  RASH Verified 10/12/18 12:23


 


vancomycin Allergy  RASH Verified 10/12/18 12:23














- Laboratory Results


Result Diagrams: 


                                 10/12/18 12:50





                                 10/12/18 12:50





- ECG


O2 Sat by Pulse Oximetry: 99





Medical Decision Making


Medical Decision Making: 





Accession No. : [id number]


Patient Name / ID : SYD WHITAKER  / [id number]


Exam Date : 10/12/2018 14:06:07 ( Approved )


Study Comment : 


Sex / Age : F  / 48Y





Creator : elaine atkins


Dictator : Jose M Prieto MD


 : 


Approver : Jose M Prieto MD


Approver2 : 





Report Date : 10/12/2018 14:29:15


My Comment : 


 

********************************************************************************


***





Date of service: 





10/12/2018





PROCEDURE:  CT Abdomen and Pelvis with contrast





HISTORY:


abd malignancy r/o perforation





COMPARISON:


9/11/2018





TECHNIQUE:


Contrast dose: 99 mL Omnipaque 300





Radiation dose:





Total exam DLP = 637.96 mGy-cm.





This CT exam was performed using one or more of the following dose reduction 

techniques: Automated exposure control, adjustment of the mA and/or kV according

to patient size, and/or use of iterative reconstruction technique.





FINDINGS:





LOWER THORAX:


Unremarkable. 





LIVER:


Extensive heterogeneity in posterior right hepatic lobe suspicious for neoplasm.

No discrete mass identified elsewhere. No biliary dilatation. 





GALLBLADDER AND BILE DUCTS:


Gallbladder not visualized. Question prior cholecystectomy versus contracted 

gallbladder. 





PANCREAS:


Unremarkable. No gross lesion or ductal dilatation.





SPLEEN:


Unremarkable. 





ADRENALS:


Unremarkable. No mass. 





KIDNEYS AND URETERS:


Unremarkable. No hydronephrosis. No solid mass. 





VASCULATURE:


Unremarkable. No aortic aneurysm. 





BOWEL:


Small-bowel obstruction. Point of obstruction identified in the left lower 

quadrant of the abdomen with multiple collapsed loops of ileum distal to this 

obstruction. There is swirling of mesenteric vessels and some localized 

tethering suggestive of possible internal hernia. No mass identified. 





APPENDIX:


Normal appendix. 





PERITONEUM:


Minimal ascites in cul-de-sac. Nonspecific. 





LYMPH NODES:


Retroperitoneal lymphadenopathy noted. No pelvic lymphadenopathy. 





BLADDER:


Unremarkable. 





REPRODUCTIVE:


Normal uterus 





BONES:


No acute fracture. Artificial disc spacer at L4-5. 





OTHER FINDINGS:


None.





IMPRESSION:


Suspicious for neoplasm involving the posterior right hepatic lobe. Extensive 

heterogeneity without discrete mass. Retroperitoneal lymphadenopathy. Mechanical

small-bowel obstruction with point of obstruction identified in the left lower 

quadrant of the abdomen possibly due to internal hernia. No other acute 

abnormality.








------------





labs reviewed WBC normal


Surgical team paged and arrangements for admission made








Disposition





- Disposition

## 2018-10-12 NOTE — CP.PCM.CON
History of Present Illness





- History of Present Illness


History of Present Illness: 





Surgery: Dr Meehan





CC: Abd pain





HPI: 48F w. hx of colon CA w. mets to liver and ovaries received neoadjuvant 

chemo in 2016 with subsequent robotic L colectomy and oopherectomy. Liver 

resection was attempted but had to be aborted due to bleeding prompting pt to 

receive TACE.  Pt presents with acute onset of abdominal pain which started 

yesterday morning. The pain is constant and has been increasing in severity 

despite taking dilaudid 2mg PO q2 PRN and oxycontin 60mg PO TID. Pain is 

diffuse. Pain has been accompanied by multiple episodes of N/V. Pt is passing 

flatus. Last BM was yesterday. She reports subjective fevers. CT scan done in ED

showed SBO in LLQ with probable internal hernia.





PMH: Colon CA w. mets to liver and ovaries, HTN, DM


PSH: Robotic L colectomy w. oopherectomy, attempted liver resection, Cervical 

and lumbar fusion


Meds: MAR reviewed


ALL: PCN, vanco > rash


Social: No ETOH/tobacco/drugs


Fhx: Non-contributory








Review of Systems





- Review of Systems


All systems: reviewed and no additional remarkable complaints except (HPI)





Past Patient History





- Infectious Disease


Hx of Infectious Diseases: None





- Tetanus Immunizations


Tetanus Immunization: Unknown





- Past Medical History & Family History


Past Medical History?: Yes





- Past Social History


Smoking Status: Never Smoked





- CARDIAC


Hx Atrial Fibrillation: No


Hx Cardia Arrhythmia: No


Hx Congestive Heart Failure: No


Hx Hypercholesterolemia: No


Hx Hypertension: Yes


Hx Mitral Valve Prolapse: No


Hx Peripheral Edema: No





- PULMONARY


Hx Asthma: No


Hx Bronchitis: No


Hx Chronic Obstructive Pulmonary Disease (COPD): No


Hx Emphysema: No


Hx Pneumonia: No


Hx Pulmonary Embolism: No


Hx Sleep Apnea: No





- NEUROLOGICAL


Hx Alzheimer's Disease: No


Hx Dementia: No


Hx Migraine: No


Hx Multiple Sclerosis: No


Hx Parkinson's Disease: No


Hx Seizures: No


Hx Transient Ischemic Attacks (TIA): No





- HEENT


Hx HEENT Problems: No





- RENAL


Hx Chronic Kidney Disease: No


Hx Kidney Stones: No





- ENDOCRINE/METABOLIC


Hx Endocrine Disorders: Yes


Hx Diabetes Mellitus Type 2: Yes


Hx Hyperthyroidism: No


Hx Hypothyroidism: No





- HEMATOLOGICAL/ONCOLOGICAL


Hx AIDS: No


Hx Anemia: No


Hx Blood Transfusions: Yes


Hx Blood Transfusion Reaction: No


Hx Human Immunodeficiency Virus (HIV): No


Hx Sickle Cell Disease: No





- INTEGUMENTARY


Hx Dermatological Problems: No





- MUSCULOSKELETAL/RHEUMATOLOGICAL


Hx Arthritis: Yes


Hx Back Pain: Yes


Hx Falls: Yes


Hx Fractures: No


Hx Osteoporosis: No


Hx Rheumatoid Arthritis: No





- GASTROINTESTINAL


Hx Crohn's Disease: No


Hx Diverticulitis: No


Hx Gall Bladder Disease: No


Hx Pancreatitis: No


Other/Comment: Colon CA with mets to liver and ovaries





- GENITOURINARY/GYNECOLOGICAL


Hx Genitourinary Disorders: No


Hx Sexually Transmitted Disorders: No





- PSYCHIATRIC


Hx Anxiety: Yes


Hx Bipolar Disorder: No


Hx Depression: Yes


Hx Post Traumatic Stress Disorder: No


Hx Schizophrenia: No


Hx Substance Use: No





- SURGICAL HISTORY


Hx Appendectomy: No


Hx Carotid Endarterectomy: No


Hx Cholecystectomy: No


Hx Coronary Artery Bypass Graft: No


Hx Coronary Stent: No


Hx Tonsillectomy: No


Other/Comment: Attempted liver resection.  Robotic left colectomy with 

oophorectomy.  Cervical and lumba fusion.  Endoscopy





- ANESTHESIA


Hx Anesthesia: Yes


Hx Anesthesia Reactions: No


Hx Malignant Hyperthermia: No





Meds


Allergies/Adverse Reactions: 


                                    Allergies











Allergy/AdvReac Type Severity Reaction Status Date / Time


 


penicillin G Allergy  RASH Verified 10/12/18 12:23


 


vancomycin Allergy  RASH Verified 10/12/18 12:23














- Medications


Medications: 


                               Current Medications





Dextrose (Dextrose 50% Inj)  0 ml IV STAT PRN; Protocol


   PRN Reason: Hypoglycemia Protocol


Dextrose (Glutose 15)  0 gm PO ONCE PRN; Protocol


   PRN Reason: Hypoglycemia Protocol


Glucagon (Glucagen Diagnostic Kit)  0 mg IM STAT PRN; Protocol


   PRN Reason: Hypoglycemia Protocol


Hydromorphone HCl (Dilaudid)  2 mg IVP Q3 PRN


   PRN Reason: Pain, severe (8-10)


Dextrose/Sodium Chloride (Dextrose 5%/0.45% Ns 1000 Ml)  1,000 mls @ 200 mls/hr 

IV .Q5H JAM


   Last Admin: 10/12/18 16:53 Dose:  200 mls/hr


Potassium Chloride (Potassium Chloride 20 Meq/100 Ml)  100 mls @ 50 mls/hr IVPB 

Q2 JAM


   Stop: 10/12/18 21:59


   Last Admin: 10/12/18 18:56 Dose:  50 mls/hr


Insulin Human Lispro (Humalog)  0 units SC ACCU-CHECK JAM; Protocol


   Last Admin: 10/12/18 18:25 Dose:  Not Given


Lidocaine (Lidoderm)  1 ea TD DAILY JAM


Morphine Sulfate (Morphine)  2 mg IVP Q4 PRN


   PRN Reason: Pain, moderate (4-7)


Morphine Sulfate (Morphine)  1 mg IVP Q4 PRN


   PRN Reason: Pain, Mild (1-3)


Ondansetron HCl (Zofran Inj)  2 mg IVP Q6 PRN


   PRN Reason: Nausea/Vomiting


   Last Admin: 10/12/18 17:03 Dose:  2 mg











Physical Exam





- Constitutional


Appears: Other (uncomfortable)





- Head Exam


Head Exam: ATRAUMATIC, NORMOCEPHALIC





- Eye Exam


Eye Exam: EOMI





- ENT Exam


ENT Exam: Mucous Membranes Dry





- Neck Exam


Neck exam: Positive for: Full Rom





- Respiratory Exam


Respiratory Exam: NORMAL BREATHING PATTERN.  absent: Accessory Muscle Use, 

Respiratory Distress





- Cardiovascular Exam


Cardiovascular Exam: Tachycardia





- GI/Abdominal Exam


GI & Abdominal Exam: Rebound (LLQ, RUQ), Soft, Tenderness (LLQ).  absent: 

Distended, Firm, Guarding, Rigid


Additional comments: 





Kocher incision, transverse LLQ incision





- Extremities Exam


Extremities exam: Negative for: calf tenderness, pedal edema





- Neurological Exam


Neurological exam: Alert, Oriented x3





- Psychiatric Exam


Psychiatric exam: Normal Affect, Normal Mood





- Skin


Skin Exam: Normal Color, Warm





Results





- Vital Signs


Recent Vital Signs: 


                                Last Vital Signs











Temp  98.1 F   10/12/18 18:45


 


Pulse  80   10/12/18 18:45


 


Resp  17   10/12/18 18:45


 


BP  148/91 H  10/12/18 18:45


 


Pulse Ox  99   10/12/18 18:45














- Labs


Result Diagrams: 


                                 10/12/18 12:50





                                 10/12/18 12:50


Labs: 


                         Laboratory Results - last 24 hr











  10/12/18 10/12/18 10/12/18





  12:50 12:50 17:27


 


WBC  7.3  


 


RBC  5.01  


 


Hgb  14.0  


 


Hct  41.8  


 


MCV  83.3  


 


MCH  27.8  


 


MCHC  33.4  


 


RDW  15.1 H  


 


Plt Count  301  


 


MPV  8.9  


 


Neut % (Auto)  66.0  


 


Lymph % (Auto)  22.7  


 


Mono % (Auto)  10.5 H  


 


Eos % (Auto)  0.6  


 


Baso % (Auto)  0.2  


 


Neut # (Auto)  4.8  


 


Lymph # (Auto)  1.7  


 


Mono # (Auto)  0.8  


 


Eos # (Auto)  0.0  


 


Baso # (Auto)  0.0  


 


PT   


 


INR   


 


APTT   


 


pCO2    44


 


pO2    55 L


 


HCO3    27.3


 


ABG pH    7.42


 


ABG Total CO2    29.9 H


 


ABG O2 Saturation    92.8 L


 


ABG Base Excess    3.4 H


 


Patricio Test    Yes


 


ABG Potassium    3.0 L


 


Glucose    172 H


 


Lactate    1.3


 


FiO2    21.0


 


Sodium   138  134.0


 


Potassium   3.1 L 


 


Chloride   99  98.0


 


Carbon Dioxide   27 


 


Anion Gap   15 


 


BUN   5 L 


 


Creatinine   0.4 L 


 


Est GFR ( Amer)   > 60 


 


Est GFR (Non-Af Amer)   > 60 


 


POC Glucose (mg/dL)   


 


Random Glucose   199 H 


 


Calcium   10.5 H 


 


Total Bilirubin   0.6 


 


AST   44 H 


 


ALT   52  D 


 


Alkaline Phosphatase   209 H 


 


Total Protein   8.8 H 


 


Albumin   4.5 


 


Globulin   4.3 H 


 


Albumin/Globulin Ratio   1.0 


 


Lipase   40 


 


Arterial Blood Potassium    3.0 L














  10/12/18 10/12/18





  18:09 18:39


 


WBC  


 


RBC  


 


Hgb  


 


Hct  


 


MCV  


 


MCH  


 


MCHC  


 


RDW  


 


Plt Count  


 


MPV  


 


Neut % (Auto)  


 


Lymph % (Auto)  


 


Mono % (Auto)  


 


Eos % (Auto)  


 


Baso % (Auto)  


 


Neut # (Auto)  


 


Lymph # (Auto)  


 


Mono # (Auto)  


 


Eos # (Auto)  


 


Baso # (Auto)  


 


PT   13.8 H


 


INR   1.2


 


APTT   30.1


 


pCO2  


 


pO2  


 


HCO3  


 


ABG pH  


 


ABG Total CO2  


 


ABG O2 Saturation  


 


ABG Base Excess  


 


Patricio Test  


 


ABG Potassium  


 


Glucose  


 


Lactate  


 


FiO2  


 


Sodium  


 


Potassium  


 


Chloride  


 


Carbon Dioxide  


 


Anion Gap  


 


BUN  


 


Creatinine  


 


Est GFR ( Amer)  


 


Est GFR (Non-Af Amer)  


 


POC Glucose (mg/dL)  169 H 


 


Random Glucose  


 


Calcium  


 


Total Bilirubin  


 


AST  


 


ALT  


 


Alkaline Phosphatase  


 


Total Protein  


 


Albumin  


 


Globulin  


 


Albumin/Globulin Ratio  


 


Lipase  


 


Arterial Blood Potassium  














- Imaging and Cardiology


  ** CT scan - abdomen


Status: Image reviewed by me, Report reviewed by me





  ** Chest x-ray


Status: Image reviewed by me





Assessment & Plan





- Assessment and Plan (Free Text)


Assessment: 





48F w. hx of metastatic colon CA s/p L colectomy, oopherectomy, attempted Liver 

resection now w. SBO 2/2 questionable internal hernia


-NPO


-NGT low intermittent suction


-IVF


-Strict Is:Os


-Serial abd exams


-Explained to pt that failure of pain to resolve or exacerbation of pain will 

likely require surgical intervention


-will follow closely


-d/w attending





Florinda PGY4

## 2018-10-12 NOTE — ED PDOC
HPI: Abdomen


Time Seen by Provider: 10/12/18 02:05


Chief Complaint (Nursing): Abdominal Pain


Chief Complaint (Provider): abdominal pain


History Per: Patient


History/Exam Limitations: no limitations


Onset/Duration Of Symptoms: Days


Current Symptoms Are (Timing): Still Present


Additional Complaint(s): 





Bobby Centeno is a 48 year old female, with a past medical history of metatastic

colon CA and chronic back pain, who presents to the emergency department 

complaining of abdominal pain. Patient is well known to provider for multiple 

visits related to abdominal pain. She has required admissions at times for her 

intractable pain. Patient is on her 53rd cycle of chemotherapy at [facility]. She was seen earlier at [facility] center where she 

received IV Dilaudid for pain but states pain did not improved. She reports 

nausea and vomiting multiple times and has also been taking Dilaudid PO at home 

with no relief. She denies any other medical complaints.





PMD: Clinic. 





Past Medical History


Reviewed: Historical Data, Nursing Documentation, Vital Signs


Vital Signs: 





                                Last Vital Signs











Temp  97.5 F L  10/12/18 02:08


 


Pulse  105 H  10/12/18 02:08


 


Resp  18   10/12/18 02:08


 


BP  126/78   10/12/18 02:08


 


Pulse Ox  98   10/12/18 02:08














- Medical History


PMH: Anxiety, Arthritis, Back Problems, Depression, Diabetes, Fibromyalgia, 

GERD, HTN, Malignancy


   Denies: Terrance's Disease, Alzheimer's Disease, Anemia, Asthma, Atrial 

Fibrillation, Benign Prostatic Hyperplasia, Bipolar Disorder, Bronchitis, Cardia

Arrhythmia, Cardiac Aneurysm, CHF, Colonic Polyps, COPD, Crohn's Disease, 

Cushing's Syndrome, Dementia, Diverticulitis, Deep Vein Thrombosis, Emphysema, 

Gastrointestinal Ulcer, Gall Bladder Disease, Graves' Disease, Hiatal Hernia, 

HIV, Hypercholesterolemia, Hyperlipidemia, Hyperthyroidism, Hypothyroidism, 

Kidney Stones, Migraine, Mitral Valve Prolapse, Multiple Sclerosis, Obstructive 

Bowel, Osteoporosis, Pancreatitis, Parkinson's Disease, Pericarditis, Peripheral

Edema, Personality Disorder, Pneumonia, Pneumothorax, Post Traumatic Stress 

Disorder, Pulmonary Embolism, End Stage Renal Disease, Chronic Kidney Disease, 

Rheumatoid Arthritis, Schizophrenia, Seizures, Sickle Cell Disease, Sexually 

Transmitted Disease, Sleep Apnea, TIA


Other PMH: metastatic colon CA





- Surgical History


Surgical History: Back Surgery, Endoscopy


   Denies: Appendectomy, CABG, Carotid Endarterectomy, Cholecystectomy, Coronary

Stent, Tonsillectomy





- Family History


Family History: States: Unknown Family Hx





- Social History


Current smoker - smoking cessation education provided: No


Alcohol: None


Drugs: Denies





- Immunization History


Hx Influenza Vaccination: Yes


Hx Pneumococcal Vaccination: No





- Home Medications


Home Medications: 


                                Ambulatory Orders











 Medication  Instructions  Recorded


 


amLODIPine [Norvasc] 10 mg PO DAILY #0 tab 10/12/15


 


DULoxetine [Cymbalta] 60 mg PO HS 10/30/15


 


Omeprazole Magnesium [Prilosec Otc] 40 mg PO DAILY 08/08/18


 


Gabapentin [Neurontin] 300 mg PO Q8 08/29/18


 


metFORMIN [glucOPHAGE] 500 mg PO DAILY 08/29/18


 


Bisacodyl [Dulcolax] 5 mg PO BID  ect 09/01/18


 


HYDROmorphone [Dilaudid] 2 mg PO Q4 #42 tab 09/01/18


 


oxyCODONE [oxyCONTIN Extended 40 mg PO Q12 #14 tabsr 09/01/18





Release Tab]  


 


Methylprednisolone [Medrol Dose 4 mg PO DAILY 09/12/18





Pack (21 tabs)]  














- Allergies


Allergies/Adverse Reactions: 


                                    Allergies











Allergy/AdvReac Type Severity Reaction Status Date / Time


 


penicillin G Allergy  RASH Verified 10/12/18 02:08


 


vancomycin Allergy  RASH Verified 10/12/18 02:08














Review of Systems


ROS Statement: Except As Marked, All Systems Reviewed And Found Negative


Gastrointestinal: Positive for: Nausea, Vomiting, Abdominal Pain


Musculoskeletal: Positive for: Back Pain (chronic)





Physical Exam





- Reviewed


Nursing Documentation Reviewed: Yes


Vital Signs Reviewed: Yes





- Physical Exam


Appears: Positive for: Uncomfortable


Head Exam: Positive for: ATRAUMATIC, NORMAL INSPECTION, NORMOCEPHALIC


Skin: Positive for: Normal Color, Warm, Dry


Eye Exam: Positive for: Normal appearance, EOMI, PERRL


Neck: Positive for: Painless ROM


Cardiovascular/Chest: Positive for: Regular Rate, Rhythm.  Negative for: Murmur


Respiratory: Positive for: Normal Breath Sounds.  Negative for: Respiratory 

Distress


Gastrointestinal/Abdominal: Positive for: Tenderness (diffused)


Back: Positive for: Normal Inspection.  Negative for: L CVA Tenderness, R CVA 

Tenderness, Vertebral Tenderness


Extremity: Positive for: Normal ROM.  Negative for: Deformity, Swelling


Neurologic/Psych: Positive for: Alert, Oriented





- Laboratory Results


Result Diagrams: 


                                 10/12/18 02:45





                                 10/12/18 02:45





- ECG


O2 Sat by Pulse Oximetry: 98 (RA)


Pulse Ox Interpretation: Normal





Medical Decision Making


Medical Decision Making: 





Time:02:05


Initial Impression: 47 y/o  female with acute on chronic abdominal pain 

in setting of known colon CA





Initial Plan:





--EKG


--CMP


--Lipase


--Magnesium


--Urine pregnancy


--Urine dipstick


--CBC w/ differential


--Dilaudid 2 mg IVP


--Sodium Chloride 1,000 ml IV 1,000 mls/hr


--Zofran 4 mg IV


--Urinalysis


--Reevaluation








04:20


-Patient reports improvement of symptoms. Patient is requesting to be admitted 

for further monitoring of her pain. Labs showed no clinical significant 

abnormalities. Patient was advised she should follow up with clinic. Dr. Recio spoke with patient and made an arrangement for rapid follow up. 

Patient amenable to go home now. Diagnosis of acute on chronic abdominal pain 

and colon CA. 





 

--------------------------------------------------------------------------------


-----





Scribe Attestation:


Documented by Jair Finley, acting as a scribe for Zana Contreras MD.





Provider Scribe Attestation:


All medical record entries made by the Scribe were at my direction and 

personally dictated by me. I have reviewed the chart and agree that the record 

accurately reflects my personal performance of the history, physical exam, 

medical decision making, and the department course for this patient. I have also

personally directed, reviewed, and agree with the discharge instructions and 

disposition.





Disposition





- Clinical Impression


Clinical Impression: 


 Chronic abdominal pain








- Disposition


Disposition: Routine/Home


Disposition Time: 04:20


Condition: IMPROVED


Instructions:  Chronic Pain (DC)


Forms:  [facility] (English)

## 2018-10-12 NOTE — CARD
--------------- APPROVED REPORT --------------





Date of service: 10/12/2018



EKG Measurement

Heart [id number]

[id number]

[id number]

[id number]

[id number]



<Conclusion>

Normal sinus rhythm

Normal ECG ,[email or web address],[email or web address],DirectAddress_Unknown

## 2018-10-12 NOTE — RAD
Date of service: 



10/12/2018



HISTORY:

 s/p NGT 



COMPARISON:

03/02/2018 



FINDINGS:



LUNGS:

No active pulmonary disease.



PLEURA:

No significant pleural effusion identified, no pneumothorax apparent.



CARDIOVASCULAR:

 No radiographic findings to suggest acute or significant 

cardiovascular disease. Venous access catheter in stable, 

satisfactory position.



OSSEOUS STRUCTURES:

No significant abnormalities.



VISUALIZED UPPER ABDOMEN:

Incompletely visualized nasogastric tube.  Despite extensive 

manipulation of the images the nasogastric tube is not seen with 

clarity beyond the distal esophagus/gastroesophageal junction. 



OTHER FINDINGS:

None.



IMPRESSION:

Status post nasogastric tube placement.  Technical factors preclude 

accurate assessment of the course of the tube in the esophagus and 

the tip.  A follow-up overpenetrated images recommended.

## 2018-10-12 NOTE — CP.PCM.HP
<Meagan Francis - Last Filed: 10/12/18 17:36>





History of Present Illness





- History of Present Illness


History of Present Illness: 





This is 47 y/o F with PMH of HTN, chronic back/abdominal pain with multiple 

nerves blocks (Sees pain management as out patient), Colon cancer w/ metastasis 

to liver (on chemotherapy), Depression, Fibromyalgia, GERD admitted to the [facility] 

for evaluation and treatment of SBO. Patient presented to the ER c/o generalized

severe abdominal pain which started yesterday morning after she took her 

medications. Pain is constant, 10/10, sharp in nature, generalized, no allev

iating (Dilaudid 1.5 mg PO/Oxycontine) or aggravating factors and associated 

with nausea and 10 episodes of nonbloddy nonbillious vomiting. Denies fever, 

cough, SOB,chest pain, rectal bleeding, pedal edema, dysuria. Patient s/p 

bilateral sacroiliac joint injections for back pain on 7/21, Celiac plexus block

7/30/18. Patient has been seen by  Heme-Oncologist: Dr. Hilton Sandoval at 

[facility]. 





PCP: [facility]


Heme-Oncologist: Dr. iHlton Sandoval at [facility] on chemotherapy 


Pain management: Dr. Winn


PMH: chronic back pain, fibromyalgia, HTN, GERD, colon cancer, anxiety, Colon ca

ncer w/ metastasis to liver (on chemotherapy)


PSH: C6 fusion, Colon resection 2015  and b/l oopherectomy, chemoembolization of

liver.


Allg: penicillin 


SocialHx: denies ETOH/tobacco/drug abuse


FamilyHx: DM, HTN


Meds: as per med rec





ROS as per HPI





ER course: 


Tm 96, , RR20, 143/97, Spo2 99


CBC: WNL


CMP: Significant for K+ 3.1, AST 44, ALKP 209, Lipase 40


CT abdo: Suspicious for neoplasm involving the posterior right hepatic lobe. 

Extensive heterogeneity without discrete mass. Retroperitoneal lymphadenopathy. 

Mechanical small-bowel obstruction with point of obstruction identified in the 

left lower quadrant of the abdomen possibly due to internal hernia. No other 

acute abnormality.


S/p Dilaudid


S/p IVF /Bolus 


S/p Zofran 








Present on Admission





- Present on Admission


Any Indicators Present on Admission: No


History of DVT/PE: No


History of Uncontrolled Diabetes: No





Past Patient History





- Infectious Disease


Hx of Infectious Diseases: None





- Tetanus Immunizations


Tetanus Immunization: Unknown





- Past Medical History & Family History


Past Medical History?: Yes





- Past Social History


Smoking Status: Never Smoked





- CARDIAC


Hx Atrial Fibrillation: No


Hx Cardia Arrhythmia: No


Hx Congestive Heart Failure: No


Hx Hypercholesterolemia: No


Hx Hypertension: Yes


Hx Mitral Valve Prolapse: No


Hx Peripheral Edema: No





- PULMONARY


Hx Asthma: No


Hx Bronchitis: No


Hx Chronic Obstructive Pulmonary Disease (COPD): No


Hx Emphysema: No


Hx Pneumonia: No


Hx Pulmonary Embolism: No


Hx Sleep Apnea: No





- NEUROLOGICAL


Hx Alzheimer's Disease: No


Hx Dementia: No


Hx Migraine: No


Hx Multiple Sclerosis: No


Hx Parkinson's Disease: No


Hx Seizures: No


Hx Transient Ischemic Attacks (TIA): No





- HEENT


Hx HEENT Problems: No





- RENAL


Hx Chronic Kidney Disease: No


Hx Kidney Stones: No





- ENDOCRINE/METABOLIC


Hx Hyperthyroidism: No


Hx Hypothyroidism: No





- HEMATOLOGICAL/ONCOLOGICAL


Hx Anemia: No


Hx Human Immunodeficiency Virus (HIV): No


Hx Sickle Cell Disease: No





- INTEGUMENTARY


Hx Dermatological Problems: No





- MUSCULOSKELETAL/RHEUMATOLOGICAL


Hx Arthritis: Yes


Hx Osteoporosis: No


Hx Rheumatoid Arthritis: No





- GASTROINTESTINAL


Hx Crohn's Disease: No


Hx Diverticulitis: No


Hx Gall Bladder Disease: No


Hx Pancreatitis: No





- GENITOURINARY/GYNECOLOGICAL


Hx Sexually Transmitted Disorders: No





- PSYCHIATRIC


Hx Anxiety: Yes


Hx Bipolar Disorder: No


Hx Depression: Yes


Hx Post Traumatic Stress Disorder: No


Hx Schizophrenia: No





- SURGICAL HISTORY


Hx Appendectomy: No


Hx Carotid Endarterectomy: No


Hx Cholecystectomy: No


Hx Coronary Artery Bypass Graft: No


Hx Coronary Stent: No


Hx Tonsillectomy: No





- ANESTHESIA


Hx Anesthesia: Yes


Hx Anesthesia Reactions: No


Hx Malignant Hyperthermia: No





Meds


Allergies/Adverse Reactions: 


                                    Allergies











Allergy/AdvReac Type Severity Reaction Status Date / Time


 


penicillin G Allergy  RASH Verified 10/12/18 12:23


 


vancomycin Allergy  RASH Verified 10/12/18 12:23














Physical Exam





- Constitutional


Appears: In Acute Distress


Additional comments: 





Facial rash, due to chemo tx





- Head Exam


Head Exam: NORMAL INSPECTION





- Eye Exam


Eye Exam: EOMI, Normal appearance





- ENT Exam


ENT Exam: Mucous Membranes Moist





- Neck Exam


Neck exam: Positive for: Normal Inspection





- Respiratory Exam


Respiratory Exam: Clear to Auscultation Bilateral, NORMAL BREATHING PATTERN.  

absent: Accessory Muscle Use, Chest Wall Tenderness, Wheezes





- Cardiovascular Exam


Cardiovascular Exam: REGULAR RHYTHM, +S1, +S2





- GI/Abdominal Exam


GI & Abdominal Exam: Guarding, Normal Bowel Sounds, Rebound (RUQ and LLQ), Soft,

Tenderness (Diffuse ).  absent: Distended





- Extremities Exam


Extremities exam: Positive for: normal inspection





- Back Exam


Back exam: NORMAL INSPECTION.  absent: CVA tenderness (L), CVA tenderness (R)





- Neurological Exam


Neurological exam: Alert, CN II-XII Intact, Oriented x3





- Psychiatric Exam


Psychiatric exam: Normal Affect





- Skin


Skin Exam: Normal Color





Results





- Vital Signs


Recent Vital Signs: 





                                Last Vital Signs











Temp  96 F L  10/12/18 12:24


 


Pulse  127 H  10/12/18 12:24


 


Resp  20   10/12/18 12:24


 


BP  143/97 H  10/12/18 12:24


 


Pulse Ox  99   10/12/18 15:56














- Labs


Result Diagrams: 


                                 10/12/18 12:50





                                 10/12/18 12:50


Labs: 





                         Laboratory Results - last 24 hr











  10/12/18 10/12/18





  12:50 12:50


 


WBC  7.3 


 


RBC  5.01 


 


Hgb  14.0 


 


Hct  41.8 


 


MCV  83.3 


 


MCH  27.8 


 


MCHC  33.4 


 


RDW  15.1 H 


 


Plt Count  301 


 


MPV  8.9 


 


Neut % (Auto)  66.0 


 


Lymph % (Auto)  22.7 


 


Mono % (Auto)  10.5 H 


 


Eos % (Auto)  0.6 


 


Baso % (Auto)  0.2 


 


Neut # (Auto)  4.8 


 


Lymph # (Auto)  1.7 


 


Mono # (Auto)  0.8 


 


Eos # (Auto)  0.0 


 


Baso # (Auto)  0.0 


 


Sodium   138


 


Potassium   3.1 L


 


Chloride   99


 


Carbon Dioxide   27


 


Anion Gap   15


 


BUN   5 L


 


Creatinine   0.4 L


 


Est GFR ( Amer)   > 60


 


Est GFR (Non-Af Amer)   > 60


 


Random Glucose   199 H


 


Calcium   10.5 H


 


Total Bilirubin   0.6


 


AST   44 H


 


ALT   52  D


 


Alkaline Phosphatase   209 H


 


Total Protein   8.8 H


 


Albumin   4.5


 


Globulin   4.3 H


 


Albumin/Globulin Ratio   1.0


 


Lipase   40














Assessment & Plan





- Assessment and Plan (Free Text)


Assessment: 





A/P: 


47 y/o F with PMH of HTN, chronic back/abdominal pain with multiple nerves 

blocks (Sees pain management as out patient), Colon cancer w/ metastasis to 

liver (on chemotherapy), Depression, Fibromyalgia, GERD admitted to the [facility] for

evaluation and treatment of SBO.





Small Bowel Obstruction possibly due to internal hernia


- Afebrile


- CT: Mechanical small-bowel obstruction with point of obstruction identified in

the left lower quadrant of the abdomen possibly due to internal hernia


- Surgery consult, Dr. Velarde, recommendations appreciated 


- Possible NG tube/surgical intervention/will follow recommendations


- NPO


- C/w IVF


- Pain control: Morphine and Hydromorphone 


- Zofran for nausea and vomiting  


- Follow up PT/INR/PTT


- EKG and CXR done today and reviewed 


- Follow up ABG 





Hypokalemia 


- Replete as needed 





Chronic back pain 


- 2/2 metastatic colon Ca


- Chronic, H/O multiple lumbar surgeries. 


- Lidoderm patch for back pain


- Pain management consult, Dr. Winn, will follow recommendations 


- C/w pain management 





NIDDM


- Controlled 


- Last A1c 7.7 on 5/28/18


- Low dose sliding scale


- Hold metformin 


- Hypoglycemia protocol 





HTN


- Controlled


- Hold Amlodipine 10 mg QD


- Monitor vitals





Fibromyalgia


- Controlled


- Hold Gabapentin 300 mg TID


- Hold Duloxetine 60 mg PO HS





DVT prophylaxis


- SCD for now, possible surgical intervention  





<Edin Patel D - Last Filed: 10/12/18 19:25>





Results





- Vital Signs


Recent Vital Signs: 





                                Last Vital Signs











Temp  98.1 F   10/12/18 18:45


 


Pulse  80   10/12/18 18:45


 


Resp  17   10/12/18 18:45


 


BP  148/91 H  10/12/18 18:45


 


Pulse Ox  99   10/12/18 18:45














- Labs


Result Diagrams: 


                                 10/12/18 12:50





                                 10/12/18 12:50


Labs: 





                         Laboratory Results - last 24 hr











  10/12/18 10/12/18 10/12/18





  12:50 12:50 17:27


 


WBC  7.3  


 


RBC  5.01  


 


Hgb  14.0  


 


Hct  41.8  


 


MCV  83.3  


 


MCH  27.8  


 


MCHC  33.4  


 


RDW  15.1 H  


 


Plt Count  301  


 


MPV  8.9  


 


Neut % (Auto)  66.0  


 


Lymph % (Auto)  22.7  


 


Mono % (Auto)  10.5 H  


 


Eos % (Auto)  0.6  


 


Baso % (Auto)  0.2  


 


Neut # (Auto)  4.8  


 


Lymph # (Auto)  1.7  


 


Mono # (Auto)  0.8  


 


Eos # (Auto)  0.0  


 


Baso # (Auto)  0.0  


 


PT   


 


INR   


 


APTT   


 


pCO2    44


 


pO2    55 L


 


HCO3    27.3


 


ABG pH    7.42


 


ABG Total CO2    29.9 H


 


ABG O2 Saturation    92.8 L


 


ABG Base Excess    3.4 H


 


Patricio Test    Yes


 


ABG Potassium    3.0 L


 


Glucose    172 H


 


Lactate    1.3


 


FiO2    21.0


 


Sodium   138  134.0


 


Potassium   3.1 L 


 


Chloride   99  98.0


 


Carbon Dioxide   27 


 


Anion Gap   15 


 


BUN   5 L 


 


Creatinine   0.4 L 


 


Est GFR ( Amer)   > 60 


 


Est GFR (Non-Af Amer)   > 60 


 


POC Glucose (mg/dL)   


 


Random Glucose   199 H 


 


Calcium   10.5 H 


 


Total Bilirubin   0.6 


 


AST   44 H 


 


ALT   52  D 


 


Alkaline Phosphatase   209 H 


 


Total Protein   8.8 H 


 


Albumin   4.5 


 


Globulin   4.3 H 


 


Albumin/Globulin Ratio   1.0 


 


Lipase   40 


 


Arterial Blood Potassium    3.0 L














  10/12/18 10/12/18





  18:09 18:39


 


WBC  


 


RBC  


 


Hgb  


 


Hct  


 


MCV  


 


MCH  


 


MCHC  


 


RDW  


 


Plt Count  


 


MPV  


 


Neut % (Auto)  


 


Lymph % (Auto)  


 


Mono % (Auto)  


 


Eos % (Auto)  


 


Baso % (Auto)  


 


Neut # (Auto)  


 


Lymph # (Auto)  


 


Mono # (Auto)  


 


Eos # (Auto)  


 


Baso # (Auto)  


 


PT   13.8 H


 


INR   1.2


 


APTT   30.1


 


pCO2  


 


pO2  


 


HCO3  


 


ABG pH  


 


ABG Total CO2  


 


ABG O2 Saturation  


 


ABG Base Excess  


 


Patricio Test  


 


ABG Potassium  


 


Glucose  


 


Lactate  


 


FiO2  


 


Sodium  


 


Potassium  


 


Chloride  


 


Carbon Dioxide  


 


Anion Gap  


 


BUN  


 


Creatinine  


 


Est GFR ( Amer)  


 


Est GFR (Non-Af Amer)  


 


POC Glucose (mg/dL)  169 H 


 


Random Glucose  


 


Calcium  


 


Total Bilirubin  


 


AST  


 


ALT  


 


Alkaline Phosphatase  


 


Total Protein  


 


Albumin  


 


Globulin  


 


Albumin/Globulin Ratio  


 


Lipase  


 


Arterial Blood Potassium  














Attending/Attestation





- Attestation


I have personally seen and examined this patient.: Yes


I have fully participated in the care of the patient.: Yes


I have reviewed all pertinent clinical information: Yes


Notes (Text): 





10/12/18 19:24


Patient seen and examined with resident. Case discussed and agreed with 

assessment and plan of management.

## 2018-10-12 NOTE — CT
Date of service: 



10/12/2018



PROCEDURE:  CT Abdomen and Pelvis with contrast



HISTORY:

abd malignancy r/o perforation



COMPARISON:

9/11/2018



TECHNIQUE:

Contrast dose: 99 mL Omnipaque 300



Radiation dose:



Total exam DLP = 637.96 mGy-cm.



This CT exam was performed using one or more of the following dose 

reduction techniques: Automated exposure control, adjustment of the 

mA and/or kV according to patient size, and/or use of iterative 

reconstruction technique.



FINDINGS:



LOWER THORAX:

Unremarkable. 



LIVER:

Extensive heterogeneity in posterior right hepatic lobe suspicious 

for neoplasm. No discrete mass identified elsewhere. No biliary 

dilatation. 



GALLBLADDER AND BILE DUCTS:

Gallbladder not visualized. Question prior cholecystectomy versus 

contracted gallbladder. 



PANCREAS:

Unremarkable. No gross lesion or ductal dilatation.



SPLEEN:

Unremarkable. 



ADRENALS:

Unremarkable. No mass. 



KIDNEYS AND URETERS:

Unremarkable. No hydronephrosis. No solid mass. 



VASCULATURE:

Unremarkable. No aortic aneurysm. 



BOWEL:

Small-bowel obstruction. Point of obstruction identified in the left 

lower quadrant of the abdomen with multiple collapsed loops of ileum 

distal to this obstruction. There is swirling of mesenteric vessels 

and some localized tethering suggestive of possible internal hernia. 

No mass identified. 



APPENDIX:

Normal appendix. 



PERITONEUM:

Minimal ascites in cul-de-sac. Nonspecific. 



LYMPH NODES:

Retroperitoneal lymphadenopathy noted. No pelvic lymphadenopathy. 



BLADDER:

Unremarkable. 



REPRODUCTIVE:

Normal uterus 



BONES:

No acute fracture. Artificial disc spacer at L4-5. 



OTHER FINDINGS:

None.



IMPRESSION:

Suspicious for neoplasm involving the posterior right hepatic lobe. 

Extensive heterogeneity without discrete mass. Retroperitoneal 

lymphadenopathy. Mechanical small-bowel obstruction with point of 

obstruction identified in the left lower quadrant of the abdomen 

possibly due to internal hernia. No other acute abnormality.

## 2018-10-13 LAB
BUN SERPL-MCNC: < 2 MG/DL (ref 7–17)
CALCIUM SERPL-MCNC: 9.5 MG/DL (ref 8.4–10.2)
ERYTHROCYTE [DISTWIDTH] IN BLOOD BY AUTOMATED COUNT: 15.4 % (ref 11.5–14.5)
GFR NON-AFRICAN AMERICAN: > 60
HGB BLD-MCNC: 13.9 G/DL (ref 12–16)
MCH RBC QN AUTO: 28.2 PG (ref 27–31)
MCHC RBC AUTO-ENTMCNC: 33.6 G/DL (ref 33–37)
MCV RBC AUTO: 83.9 FL (ref 81–99)
PLATELET # BLD: 270 K/UL (ref 130–400)
RBC # BLD AUTO: 4.94 MIL/UL (ref 3.8–5.2)
WBC # BLD AUTO: 6.5 K/UL (ref 4.8–10.8)

## 2018-10-13 RX ADMIN — DEXTROSE AND SODIUM CHLORIDE SCH MLS/HR: 5; 450 INJECTION, SOLUTION INTRAVENOUS at 12:57

## 2018-10-13 RX ADMIN — INSULIN LISPRO SCH UNITS: 100 INJECTION, SOLUTION INTRAVENOUS; SUBCUTANEOUS at 07:57

## 2018-10-13 RX ADMIN — DEXTROSE AND SODIUM CHLORIDE SCH MLS/HR: 5; 450 INJECTION, SOLUTION INTRAVENOUS at 06:51

## 2018-10-13 RX ADMIN — POTASSIUM PHOSPHATE, MONOBASIC AND POTASSIUM PHOSPHATE, DIBASIC SCH MLS/HR: 224; 236 INJECTION, SOLUTION, CONCENTRATE INTRAVENOUS at 11:00

## 2018-10-13 RX ADMIN — INSULIN LISPRO SCH: 100 INJECTION, SOLUTION INTRAVENOUS; SUBCUTANEOUS at 11:21

## 2018-10-13 RX ADMIN — INSULIN LISPRO SCH: 100 INJECTION, SOLUTION INTRAVENOUS; SUBCUTANEOUS at 22:12

## 2018-10-13 RX ADMIN — DEXTROSE AND SODIUM CHLORIDE SCH MLS/HR: 5; 450 INJECTION, SOLUTION INTRAVENOUS at 02:00

## 2018-10-13 RX ADMIN — POTASSIUM PHOSPHATE, MONOBASIC AND POTASSIUM PHOSPHATE, DIBASIC SCH MLS/HR: 224; 236 INJECTION, SOLUTION, CONCENTRATE INTRAVENOUS at 10:55

## 2018-10-13 RX ADMIN — INSULIN LISPRO SCH: 100 INJECTION, SOLUTION INTRAVENOUS; SUBCUTANEOUS at 16:01

## 2018-10-13 NOTE — CP.PCM.PN
Subjective





- Date & Time of Evaluation


Date of Evaluation: 10/13/18


Time of Evaluation: 08:20





- Subjective


Subjective: 





Surgery: Dr. Meehan





Pt seen and examined. Pt states that her pain is worse. She had one episode of 

emesis overnight. No further episodes of flatus or BM.





Objective





- Vital Signs/Intake and Output


Vital Signs (last 24 hours): 


                                        











Temp Pulse Resp BP Pulse Ox


 


 98.2 F   88   20   149/90   99 


 


 10/13/18 08:01  10/13/18 08:01  10/13/18 08:01  10/13/18 08:01  10/13/18 08:01








Intake and Output: 


                                        











 10/13/18 10/13/18





 06:59 18:59


 


Intake Total  2400


 


Output Total  550


 


Balance  1850














- Medications


Medications: 


                               Current Medications





Dextrose (Dextrose 50% Inj)  0 ml IV STAT PRN; Protocol


   PRN Reason: Hypoglycemia Protocol


Dextrose (Glutose 15)  0 gm PO ONCE PRN; Protocol


   PRN Reason: Hypoglycemia Protocol


Glucagon (Glucagen Diagnostic Kit)  0 mg IM STAT PRN; Protocol


   PRN Reason: Hypoglycemia Protocol


Hydromorphone HCl (Dilaudid)  2 mg IVP Q3 PRN


   PRN Reason: Pain, severe (8-10)


   Last Admin: 10/13/18 07:57 Dose:  2 mg


Dextrose/Sodium Chloride (Dextrose 5%/0.45% Ns 1000 Ml)  1,000 mls @ 200 mls/hr 

IV .Q5H JAM


   Last Admin: 10/13/18 06:51 Dose:  200 mls/hr


Insulin Human Lispro (Humalog)  0 units SC ACCU-CHECK JAM; Protocol


   Last Admin: 10/13/18 07:57 Dose:  2 units


Lidocaine (Lidoderm)  1 ea TD DAILY JAM


   Last Admin: 10/13/18 07:59 Dose:  1 ea


Morphine Sulfate (Morphine)  2 mg IVP Q4 PRN


   PRN Reason: Pain, moderate (4-7)


Morphine Sulfate (Morphine)  1 mg IVP Q4 PRN


   PRN Reason: Pain, Mild (1-3)


Ondansetron HCl (Zofran Inj)  2 mg IVP Q6 PRN


   PRN Reason: Nausea/Vomiting


   Last Admin: 10/13/18 08:07 Dose:  2 mg











- Labs


Labs: 


                                        





                                 10/13/18 07:50 





                                 10/12/18 12:50 





                                        











PT  13.8 Seconds (9.8-13.1)  H  10/12/18  18:39    


 


INR  1.2   10/12/18  18:39    


 


APTT  30.1 Seconds (25.6-37.1)   10/12/18  18:39    














- Constitutional


Appears: Other (uncomfortable)





- Head Exam


Head Exam: ATRAUMATIC, NORMOCEPHALIC





- Eye Exam


Eye Exam: EOMI





- ENT Exam


ENT Exam: Mucous Membranes Moist


Additional comments: 





NGT in place





- Respiratory Exam


Respiratory Exam: NORMAL BREATHING PATTERN.  absent: Accessory Muscle Use, 

Respiratory Distress





- GI/Abdominal Exam


GI & Abdominal Exam: Soft, Tenderness (LLQ), Rebound (LLQ).  absent: Distended, 

Firm, Guarding, Rigid





- Extremities Exam


Extremities Exam: absent: Calf Tenderness, Pedal Edema





- Neurological Exam


Neurological Exam: Alert, Awake, Oriented x3





Assessment and Plan





- Assessment and Plan (Free Text)


Assessment: 





48F w. SBO 2/2 possible internal hernia


-NGT: 400cc/24hr bilious plus 1 episode of 150cc emesis


-Keep NGT to suction


-NPO


-IVF


-serial exams


-Dr. Meehan to assess this AM, possible OR


-d/w attending





[facility]emaitis PGY4

## 2018-10-13 NOTE — CP.PCM.PN
Subjective





- Date & Time of Evaluation


Date of Evaluation: 10/13/18


Time of Evaluation: 08:40





- Subjective


Subjective: 


47 yo woman well known to me from outpatient pain clinic is admitted for 

abdominal pain and found to have SBO.  Awaiting surgical evaluation.





Patient has had a difficult course in terms of pain, requiring multiple 

hospitalizations and halting of chemotherapy.  She did have two courses of 

chemotherapy during the past month, until the current bout of pain that led to 

her admission.  The pain is the same as her previous abdominal pain, though 

slightly lower in terms of location.  





As an outpatient, her pain regimen was recently changed to MS Contin 60mg q8h 

and Dilaudid 4mg q6h PRN.  Since admission, she had been on Dilaudid 2mg q3h, 

which she states help for about 2.5 hours.





Objective





- Vital Signs/Intake and Output


Vital Signs (last 24 hours): 


                                        











Temp Pulse Resp BP Pulse Ox


 


 98.2 F   88   20   149/90   99 


 


 10/13/18 08:01  10/13/18 08:01  10/13/18 08:01  10/13/18 08:01  10/13/18 08:01








Intake and Output: 


                                        











 10/13/18 10/13/18





 06:59 18:59


 


Intake Total  2400


 


Output Total  550


 


Balance  1850














- Medications


Medications: 


                               Current Medications





Dextrose (Dextrose 50% Inj)  0 ml IV STAT PRN; Protocol


   PRN Reason: Hypoglycemia Protocol


Dextrose (Glutose 15)  0 gm PO ONCE PRN; Protocol


   PRN Reason: Hypoglycemia Protocol


Glucagon (Glucagen Diagnostic Kit)  0 mg IM STAT PRN; Protocol


   PRN Reason: Hypoglycemia Protocol


Hydromorphone HCl (Dilaudid)  2 mg IVP Q3 PRN


   PRN Reason: Pain, severe (8-10)


   Last Admin: 10/13/18 07:57 Dose:  2 mg


Dextrose/Sodium Chloride (Dextrose 5%/0.45% Ns 1000 Ml)  1,000 mls @ 200 mls/hr 

IV .Q5H JAM


   Last Admin: 10/13/18 06:51 Dose:  200 mls/hr


Potassium Chloride (Potassium Chloride 20 Meq/100 Ml)  100 mls @ 50 mls/hr IVPB 

Q2 JAM


   Stop: 10/13/18 13:59


Insulin Human Lispro (Humalog)  0 units SC ACCU-CHECK JAM; Protocol


   Last Admin: 10/13/18 07:57 Dose:  2 units


Lidocaine (Lidoderm)  1 ea TD DAILY JAM


   Last Admin: 10/13/18 07:59 Dose:  1 ea


Ondansetron HCl (Zofran Inj)  2 mg IVP Q6 PRN


   PRN Reason: Nausea/Vomiting


   Last Admin: 10/13/18 08:07 Dose:  2 mg











- Labs


Labs: 


                                        





                                 10/13/18 07:50 





                                 10/13/18 07:50 





                                        











PT  13.8 Seconds (9.8-13.1)  H  10/12/18  18:39    


 


INR  1.2   10/12/18  18:39    


 


APTT  30.1 Seconds (25.6-37.1)   10/12/18  18:39    














- GI/Abdominal Exam


GI & Abdominal Exam: Soft, Tenderness





Assessment and Plan


(1) Abdominal pain


Assessment & Plan: 


47 yo woman w/ chronic pain on chronic opioid theray is admitted for increased 

abdominal pain and SBO.  





- increase Dilaudid to 2.5mg q3h PRN


- f/u surgical evaluation


Status: Acute

## 2018-10-13 NOTE — CP.PCM.PN
Subjective





- Date & Time of Evaluation


Date of Evaluation: 10/13/18


Time of Evaluation: 09:10





- Subjective


Subjective: 





Patient seen and examined this morning at bedside. Mild distressed due to 

abdominal pain, NG tube in place, reports no improvement in pain. reports 

1xvomiting overnight, denies f/c/d. Patient is NPO  


NGT: 400cc/24hr bilious plus 1 episode of 150cc emesis





Objective





- Vital Signs/Intake and Output


Vital Signs (last 24 hours): 


                                        











Temp Pulse Resp BP Pulse Ox


 


 98.2 F   88   20   149/90   99 


 


 10/13/18 08:01  10/13/18 08:01  10/13/18 08:01  10/13/18 08:01  10/13/18 08:01








Intake and Output: 


                                        











 10/13/18 10/13/18





 06:59 18:59


 


Intake Total  2400


 


Output Total  550


 


Balance  1850














- Medications


Medications: 


                               Current Medications





Dextrose (Dextrose 50% Inj)  0 ml IV STAT PRN; Protocol


   PRN Reason: Hypoglycemia Protocol


Dextrose (Glutose 15)  0 gm PO ONCE PRN; Protocol


   PRN Reason: Hypoglycemia Protocol


Glucagon (Glucagen Diagnostic Kit)  0 mg IM STAT PRN; Protocol


   PRN Reason: Hypoglycemia Protocol


Hydromorphone HCl (Dilaudid)  2.5 mg IVP Q3 PRN


   PRN Reason: Pain, severe (8-10)


Dextrose/Sodium Chloride (Dextrose 5%/0.45% Ns 1000 Ml)  1,000 mls @ 200 mls/hr 

IV .Q5H JAM


   Last Admin: 10/13/18 06:51 Dose:  200 mls/hr


Potassium Chloride (Potassium Chloride 20 Meq/100 Ml)  100 mls @ 50 mls/hr IVPB 

Q2 JAM


   Stop: 10/13/18 13:59


Insulin Human Lispro (Humalog)  0 units SC ACCU-CHECK JAM; Protocol


   Last Admin: 10/13/18 07:57 Dose:  2 units


Lidocaine (Lidoderm)  1 ea TD DAILY JAM


   Last Admin: 10/13/18 07:59 Dose:  1 ea


Ondansetron HCl (Zofran Inj)  2 mg IVP Q6 PRN


   PRN Reason: Nausea/Vomiting


   Last Admin: 10/13/18 08:07 Dose:  2 mg











- Labs


Labs: 


                                        





                                 10/13/18 07:50 





                                 10/13/18 07:50 





                                        











PT  13.8 Seconds (9.8-13.1)  H  10/12/18  18:39    


 


INR  1.2   10/12/18  18:39    


 


APTT  30.1 Seconds (25.6-37.1)   10/12/18  18:39    














- Constitutional


Appears: In Acute Distress (mild)





- Head Exam


Head Exam: NORMAL INSPECTION





- Eye Exam


Eye Exam: Normal appearance





- ENT Exam


ENT Exam: Mucous Membranes Moist





- Neck Exam


Neck Exam: Normal Inspection





- Respiratory Exam


Respiratory Exam: Clear to Ausculation Bilateral, NORMAL BREATHING PATTERN





- Cardiovascular Exam


Cardiovascular Exam: REGULAR RHYTHM, +S1, +S2





- GI/Abdominal Exam


GI & Abdominal Exam: Guarding (doesn't want abdominal exam, due to pain ), 

Hyperactive Bowel Sounds





- Extremities Exam


Extremities Exam: Normal Capillary Refill, Normal Inspection.  absent: Pedal 

Edema, Tenderness





- Neurological Exam


Neurological Exam: Alert, Awake, Oriented x3





- Psychiatric Exam


Psychiatric exam: Normal Affect





- Skin


Skin Exam: Normal Color





Assessment and Plan





- Assessment and Plan (Free Text)


Assessment: 





A/P: 


47 y/o F with PMH of HTN, chronic back/abdominal pain with multiple nerves 

blocks (Sees pain management as out patient), Colon cancer w/ metastasis to 

liver (on chemotherapy), Depression, Fibromyalgia, GERD admitted to the [facility] for

evaluation and treatment of SBO.





Small Bowel Obstruction possibly due to internal hernia


- Afebrile


- CT: Mechanical small-bowel obstruction with point of obstruction identified in

the left lower quadrant of the abdomen possibly due to internal hernia


- Surgery consult, Dr. Velarde/Dr. Meehan, recommendations appreciated 


- C/w NG tube


- NPO, C/w IVF, Pain control: Morphine and Hydromorphone, Zofran for nausea and 

vomiting 


- Possible surgical intervention today  





Hypokalemia 


- Replete as needed 





Chronic back pain 


- 2/2 metastatic colon Ca


- Chronic, H/O multiple lumbar surgeries. 


- Lidoderm patch for back pain


- Pain management consult, Dr. Winn,recommendations appreciated 


- C/w pain management: Hydromorphine 2.5mg Q3PRN





NIDDM


- Controlled 


- Last A1c 7.7 on 5/28/18


- Low dose sliding scale


- Hold metformin 


- Hypoglycemia protocol 





HTN


- Controlled


- Hold Amlodipine 10 mg QD


- Monitor vitals





Fibromyalgia


- Controlled


- Hold Gabapentin 300 mg TID


- Hold Duloxetine 60 mg PO HS





DVT prophylaxis


- SCD for now, possible surgical intervention

## 2018-10-14 LAB
ALBUMIN SERPL-MCNC: 4.3 G/DL (ref 3.5–5)
ALBUMIN/GLOB SERPL: 1.1 {RATIO} (ref 1–2.1)
ALT SERPL-CCNC: 63 U/L (ref 9–52)
AST SERPL-CCNC: 61 U/L (ref 14–36)
BUN SERPL-MCNC: 12 MG/DL (ref 7–17)
BUN SERPL-MCNC: 9 MG/DL (ref 7–17)
CALCIUM SERPL-MCNC: 10.1 MG/DL (ref 8.4–10.2)
CALCIUM SERPL-MCNC: 10.4 MG/DL (ref 8.4–10.2)
ERYTHROCYTE [DISTWIDTH] IN BLOOD BY AUTOMATED COUNT: 15.3 % (ref 11.5–14.5)
GFR NON-AFRICAN AMERICAN: > 60
GFR NON-AFRICAN AMERICAN: > 60
HGB BLD-MCNC: 14.9 G/DL (ref 12–16)
MCH RBC QN AUTO: 27.9 PG (ref 27–31)
MCHC RBC AUTO-ENTMCNC: 33.4 G/DL (ref 33–37)
MCV RBC AUTO: 83.5 FL (ref 81–99)
PLATELET # BLD: 273 K/UL (ref 130–400)
RBC # BLD AUTO: 5.35 MIL/UL (ref 3.8–5.2)
WBC # BLD AUTO: 7.5 K/UL (ref 4.8–10.8)

## 2018-10-14 RX ADMIN — ENOXAPARIN SODIUM SCH MG: 40 INJECTION SUBCUTANEOUS at 16:44

## 2018-10-14 RX ADMIN — POTASSIUM CHLORIDE, DEXTROSE MONOHYDRATE AND SODIUM CHLORIDE SCH MLS/HR: 150; 5; 450 INJECTION, SOLUTION INTRAVENOUS at 20:14

## 2018-10-14 RX ADMIN — INSULIN LISPRO SCH: 100 INJECTION, SOLUTION INTRAVENOUS; SUBCUTANEOUS at 07:31

## 2018-10-14 RX ADMIN — INSULIN LISPRO SCH: 100 INJECTION, SOLUTION INTRAVENOUS; SUBCUTANEOUS at 22:17

## 2018-10-14 RX ADMIN — POTASSIUM CHLORIDE SCH MLS/HR: 14.9 INJECTION, SOLUTION INTRAVENOUS at 20:15

## 2018-10-14 RX ADMIN — POTASSIUM PHOSPHATE, MONOBASIC AND POTASSIUM PHOSPHATE, DIBASIC SCH MLS/HR: 224; 236 INJECTION, SOLUTION, CONCENTRATE INTRAVENOUS at 12:28

## 2018-10-14 RX ADMIN — POTASSIUM PHOSPHATE, MONOBASIC AND POTASSIUM PHOSPHATE, DIBASIC SCH MLS/HR: 224; 236 INJECTION, SOLUTION, CONCENTRATE INTRAVENOUS at 10:22

## 2018-10-14 RX ADMIN — POTASSIUM CHLORIDE SCH MLS/HR: 14.9 INJECTION, SOLUTION INTRAVENOUS at 21:08

## 2018-10-14 RX ADMIN — POTASSIUM CHLORIDE SCH MLS/HR: 14.9 INJECTION, SOLUTION INTRAVENOUS at 22:13

## 2018-10-14 RX ADMIN — POTASSIUM CHLORIDE, DEXTROSE MONOHYDRATE AND SODIUM CHLORIDE SCH MLS/HR: 150; 5; 450 INJECTION, SOLUTION INTRAVENOUS at 10:00

## 2018-10-14 RX ADMIN — INSULIN LISPRO SCH UNITS: 100 INJECTION, SOLUTION INTRAVENOUS; SUBCUTANEOUS at 17:16

## 2018-10-14 RX ADMIN — INSULIN LISPRO SCH: 100 INJECTION, SOLUTION INTRAVENOUS; SUBCUTANEOUS at 12:00

## 2018-10-14 NOTE — CP.PCM.PN
<Catherine Alvarez - Last Filed: 10/14/18 11:12>





Subjective





- Date & Time of Evaluation


Date of Evaluation: 10/14/18


Time of Evaluation: 10:36





- Subjective


Subjective: 





47 YO F seen in bed resting comfortably states that she continues to have pain 

but is less then yesterday, and medications are controlling her pain. She 

continues to have large output via NG tube greenish in color and is still having

some vomiting. 


- Denies any chest pain, SOB, N/V. She has not passed any gas or had a bowl 

movement yet. 





Objective





- Vital Signs/Intake and Output


Vital Signs (last 24 hours): 


                                        











Temp Pulse Resp BP Pulse Ox


 


 98.5 F   103 H  20   130/93 H  97 


 


 10/14/18 07:57  10/14/18 07:57  10/14/18 07:57  10/14/18 07:57  10/14/18 07:57











- Medications


Medications: 


                               Current Medications





Dextrose (Dextrose 50% Inj)  0 ml IV STAT PRN; Protocol


   PRN Reason: Hypoglycemia Protocol


Dextrose (Glutose 15)  0 gm PO ONCE PRN; Protocol


   PRN Reason: Hypoglycemia Protocol


Glucagon (Glucagen Diagnostic Kit)  0 mg IM STAT PRN; Protocol


   PRN Reason: Hypoglycemia Protocol


Hydromorphone HCl (Dilaudid)  1.5 mg IVP Q3 PRN


   PRN Reason: Pain, severe (8-10)


   Last Admin: 10/14/18 08:06 Dose:  1.5 mg


Potassium Chloride/Dextrose/Sod Cl (Potassium Chl 20 Meq In D5-1/2ns)  1,000 mls

@ 125 mls/hr IV .Q8H JAM


   Stop: 10/15/18 08:39


Potassium Chloride (Potassium Chloride 20 Meq/100 Ml)  100 mls @ 50 mls/hr IVPB 

Q2 JAM


   Stop: 10/14/18 11:59


   Last Admin: 10/14/18 10:22 Dose:  50 mls/hr


Insulin Human Lispro (Humalog)  0 units SC ACCU-CHECK JAM; Protocol


   Last Admin: 10/14/18 07:31 Dose:  Not Given


Ketorolac Tromethamine (Toradol)  30 mg IVP Q6 JAM


   Stop: 10/18/18 16:01


   Last Admin: 10/14/18 09:57 Dose:  30 mg


Lidocaine (Lidoderm)  1 ea TD DAILY JAM


   Last Admin: 10/14/18 08:54 Dose:  1 ea


Ondansetron HCl (Zofran Inj)  2 mg IVP Q6 PRN


   PRN Reason: Nausea/Vomiting


   Last Admin: 10/14/18 05:20 Dose:  2 mg











- Labs


Labs: 


                                        





                                 10/14/18 05:45 





                                 10/14/18 05:45 





                                        











PT  13.8 Seconds (9.8-13.1)  H  10/12/18  18:39    


 


INR  1.2   10/12/18  18:39    


 


APTT  30.1 Seconds (25.6-37.1)   10/12/18  18:39    














- Constitutional


Appears: No Acute Distress





- Head Exam


Head Exam: ATRAUMATIC, NORMAL INSPECTION





- ENT Exam


ENT Exam: Mucous Membranes Dry





- Respiratory Exam


Respiratory Exam: Clear to Ausculation Bilateral.  absent: Rhonchi, Wheezes





- Cardiovascular Exam


Cardiovascular Exam: REGULAR RHYTHM, +S1, +S2





- GI/Abdominal Exam


GI & Abdominal Exam: Soft


Additional comments: 





Diffuse abdominal tenderness. More tenderness on LLQ 





- Extremities Exam


Extremities Exam: absent: Calf Tenderness





- Neurological Exam


Neurological Exam: Alert, Awake, CN II-XII Intact, Oriented x3





Assessment and Plan





- Assessment and Plan (Free Text)


Assessment: 





A/P: 


47 y/o F with PMH of HTN, chronic back/abdominal pain with multiple nerves 

blocks (Sees pain management as out patient), Colon cancer w/ metastasis to 

liver (on chemotherapy), Depression, Fibromyalgia, GERD admitted to the [facility] for

evaluation and treatment of SBO.





Small Bowel Obstruction possibly due to internal hernia


- Afebrile


- NGT suction: 1400cc/12 hr billious fluid


- CT: Mechanical small-bowel obstruction with point of obstruction identified in

the left lower quadrant of the abdomen possibly due to internal hernia


- Surgery consult, Dr. Velarde/Dr. Meehan, recommendations appreciated 


- NPO, C/w IVF, Pain control: Morphine and Hydromorphone, Zofran for nausea and 

vomiting 


- Will follow up with surgery reccomendations. Plan for clamping NGT 15-20 min 

and encourage ambulation.





Hypokalemia 


- K+: 2.9


- Will replete and check AM labs





Chronic back pain 


- 2/2 metastatic colon Ca


- Chronic, H/O multiple lumbar surgeries. 


- Lidoderm patch for back pain


- Pain management consult, Dr. Winn,recommendations appreciated 


- C/w pain management: Hydromorphine 2.5mg Q3PRN





NIDDM


- Controlled 


- Last A1c 7.7 on 5/28/18


- Low dose sliding scale


- Hold metformin 


- Hypoglycemia protocol 





HTN


- Controlled


- Hold Amlodipine 10 mg QD


- Monitor vitals





Fibromyalgia


- Controlled


- Hold Gabapentin 300 mg TID


- Hold Duloxetine 60 mg PO HS





DVT prophylaxis


- SCD for now. Since Surgery may do surgical intervention


encourage ambulation





<Edin Patel - Last Filed: 10/14/18 11:17>





Objective





- Vital Signs/Intake and Output


Vital Signs (last 24 hours): 


                                        











Temp Pulse Resp BP Pulse Ox


 


 98.5 F   103 H  20   130/93 H  97 


 


 10/14/18 07:57  10/14/18 07:57  10/14/18 07:57  10/14/18 07:57  10/14/18 07:57











- Medications


Medications: 


                               Current Medications





Dextrose (Dextrose 50% Inj)  0 ml IV STAT PRN; Protocol


   PRN Reason: Hypoglycemia Protocol


Dextrose (Glutose 15)  0 gm PO ONCE PRN; Protocol


   PRN Reason: Hypoglycemia Protocol


Glucagon (Glucagen Diagnostic Kit)  0 mg IM STAT PRN; Protocol


   PRN Reason: Hypoglycemia Protocol


Hydromorphone HCl (Dilaudid)  1.5 mg IVP Q3 PRN


   PRN Reason: Pain, severe (8-10)


   Last Admin: 10/14/18 10:58 Dose:  1.5 mg


Potassium Chloride/Dextrose/Sod Cl (Potassium Chl 20 Meq In D5-1/2ns)  1,000 mls

@ 125 mls/hr IV .Q8H JAM


   Stop: 10/15/18 08:39


Potassium Chloride (Potassium Chloride 20 Meq/100 Ml)  100 mls @ 50 mls/hr IVPB 

Q2 JAM


   Stop: 10/14/18 11:59


   Last Admin: 10/14/18 10:22 Dose:  50 mls/hr


Insulin Human Lispro (Humalog)  0 units SC ACCU-CHECK JAM; Protocol


   Last Admin: 10/14/18 07:31 Dose:  Not Given


Ketorolac Tromethamine (Toradol)  30 mg IVP Q6 JAM


   Stop: 10/18/18 16:01


   Last Admin: 10/14/18 09:57 Dose:  30 mg


Lidocaine (Lidoderm)  1 ea TD DAILY JAM


   Last Admin: 10/14/18 08:54 Dose:  1 ea


Ondansetron HCl (Zofran Inj)  2 mg IVP Q6 PRN


   PRN Reason: Nausea/Vomiting


   Last Admin: 10/14/18 05:20 Dose:  2 mg











- Labs


Labs: 


                                        





                                 10/14/18 05:45 





                                 10/14/18 05:45 





                                        











PT  13.8 Seconds (9.8-13.1)  H  10/12/18  18:39    


 


INR  1.2   10/12/18  18:39    


 


APTT  30.1 Seconds (25.6-37.1)   10/12/18  18:39    














Attending/Attestation





- Attestation


I have personally seen and examined this patient.: Yes


I have fully participated in the care of the patient.: Yes


I have reviewed all pertinent clinical information, including history, physical 

exam and plan: Yes


Notes (Text): 





10/14/18 11:16


Patient was seen and examined with resident. Case was discussed and agreed with 

assessment and plan of management.

## 2018-10-14 NOTE — RAD
Date of service: 



10/14/2018



HISTORY:

 re-evaluation of NGT 



COMPARISON:

Comparison is made with 10/12/2018 



FINDINGS:



LUNGS:

No active pulmonary disease.



PLEURA:

No significant pleural effusion identified, no pneumothorax apparent.



CARDIOVASCULAR:

Left-sided Port-A-Cath is again seen in place.



OSSEOUS STRUCTURES:

No significant abnormalities.



VISUALIZED UPPER ABDOMEN:

There is enteric tube extending to the abdomen.



OTHER FINDINGS:

None.



IMPRESSION:

No active disease.

## 2018-10-14 NOTE — CP.PCM.PN
Subjective





- Date & Time of Evaluation


Date of Evaluation: 10/14/18


Time of Evaluation: 08:40





- Subjective


Subjective: 





Surgery: Dr. Meehan





Pt seen and examined. Pain slightly improved. Continues to have large volume NGT

output in addition to episodes of vomiting. No Flatus/BM.





Objective





- Vital Signs/Intake and Output


Vital Signs (last 24 hours): 


                                        











Temp Pulse Resp BP Pulse Ox


 


 98.5 F   103 H  20   130/93 H  97 


 


 10/14/18 07:57  10/14/18 07:57  10/14/18 07:57  10/14/18 07:57  10/14/18 07:57











- Medications


Medications: 


                               Current Medications





Dextrose (Dextrose 50% Inj)  0 ml IV STAT PRN; Protocol


   PRN Reason: Hypoglycemia Protocol


Dextrose (Glutose 15)  0 gm PO ONCE PRN; Protocol


   PRN Reason: Hypoglycemia Protocol


Glucagon (Glucagen Diagnostic Kit)  0 mg IM STAT PRN; Protocol


   PRN Reason: Hypoglycemia Protocol


Hydromorphone HCl (Dilaudid)  1.5 mg IVP Q3 PRN


   PRN Reason: Pain, severe (8-10)


   Last Admin: 10/14/18 08:06 Dose:  1.5 mg


Insulin Human Lispro (Humalog)  0 units SC ACCU-CHECK JAM; Protocol


   Last Admin: 10/14/18 07:31 Dose:  Not Given


Ketorolac Tromethamine (Toradol)  30 mg IVP Q6 JAM


   Stop: 10/18/18 16:01


   Last Admin: 10/14/18 04:06 Dose:  30 mg


Lidocaine (Lidoderm)  1 ea TD DAILY JAM


   Last Admin: 10/13/18 07:59 Dose:  1 ea


Ondansetron HCl (Zofran Inj)  2 mg IVP Q6 PRN


   PRN Reason: Nausea/Vomiting


   Last Admin: 10/14/18 05:20 Dose:  2 mg











- Labs


Labs: 


                                        





                                 10/14/18 05:45 





                                 10/14/18 05:45 





                                        











PT  13.8 Seconds (9.8-13.1)  H  10/12/18  18:39    


 


INR  1.2   10/12/18  18:39    


 


APTT  30.1 Seconds (25.6-37.1)   10/12/18  18:39    














- Constitutional


Appears: Other (Uncomforatable)





- Head Exam


Head Exam: ATRAUMATIC, NORMOCEPHALIC





- Eye Exam


Eye Exam: EOMI





- ENT Exam


ENT Exam: Mucous Membranes Dry





- Neck Exam


Neck Exam: Full ROM





- Respiratory Exam


Respiratory Exam: NORMAL BREATHING PATTERN.  absent: Accessory Muscle Use, 

Respiratory Distress





- GI/Abdominal Exam


GI & Abdominal Exam: Soft, Tenderness (LLQ).  absent: Distended, Firm, Guarding,

Rigid, Rebound





- Extremities Exam


Extremities Exam: absent: Calf Tenderness, Pedal Edema





- Neurological Exam


Neurological Exam: Alert, Awake, Oriented x3





- Psychiatric Exam


Psychiatric exam: Normal Affect, Normal Mood





Assessment and Plan





- Assessment and Plan (Free Text)


Assessment: 





48F w. hx of metastatic colon CA s/p L colectomy and oopherectomy presenting 

with SBO 2/2 possible internal hernia


-NGT: 1400cc/12hr bilious and ~250cc emesis


-Keep NGT to sxn


-Head of bed 30 degrees


-Limit narcotics


-Clamp NGT 15-20min and encourage ambulation TID


-serial abd exams


-will continue to follow


-d/w attending





Florinda PGY4

## 2018-10-15 LAB
BUN SERPL-MCNC: 19 MG/DL (ref 7–17)
CALCIUM SERPL-MCNC: 10 MG/DL (ref 8.4–10.2)
GFR NON-AFRICAN AMERICAN: > 60

## 2018-10-15 RX ADMIN — POTASSIUM CHLORIDE, DEXTROSE MONOHYDRATE AND SODIUM CHLORIDE SCH MLS/HR: 150; 5; 450 INJECTION, SOLUTION INTRAVENOUS at 17:38

## 2018-10-15 RX ADMIN — INSULIN LISPRO SCH UNITS: 100 INJECTION, SOLUTION INTRAVENOUS; SUBCUTANEOUS at 06:24

## 2018-10-15 RX ADMIN — INSULIN LISPRO SCH UNITS: 100 INJECTION, SOLUTION INTRAVENOUS; SUBCUTANEOUS at 06:14

## 2018-10-15 RX ADMIN — INSULIN LISPRO SCH UNITS: 100 INJECTION, SOLUTION INTRAVENOUS; SUBCUTANEOUS at 12:00

## 2018-10-15 RX ADMIN — INSULIN LISPRO SCH: 100 INJECTION, SOLUTION INTRAVENOUS; SUBCUTANEOUS at 16:47

## 2018-10-15 RX ADMIN — POTASSIUM CHLORIDE, DEXTROSE MONOHYDRATE AND SODIUM CHLORIDE SCH: 150; 5; 450 INJECTION, SOLUTION INTRAVENOUS at 00:45

## 2018-10-15 RX ADMIN — POTASSIUM CHLORIDE, DEXTROSE MONOHYDRATE AND SODIUM CHLORIDE SCH MLS/HR: 150; 5; 450 INJECTION, SOLUTION INTRAVENOUS at 08:22

## 2018-10-15 RX ADMIN — ENOXAPARIN SODIUM SCH MG: 40 INJECTION SUBCUTANEOUS at 08:22

## 2018-10-15 RX ADMIN — POTASSIUM PHOSPHATE, MONOBASIC AND POTASSIUM PHOSPHATE, DIBASIC SCH MLS/HR: 224; 236 INJECTION, SOLUTION, CONCENTRATE INTRAVENOUS at 13:15

## 2018-10-15 RX ADMIN — POTASSIUM PHOSPHATE, MONOBASIC AND POTASSIUM PHOSPHATE, DIBASIC SCH MLS/HR: 224; 236 INJECTION, SOLUTION, CONCENTRATE INTRAVENOUS at 12:04

## 2018-10-15 RX ADMIN — INSULIN LISPRO SCH: 100 INJECTION, SOLUTION INTRAVENOUS; SUBCUTANEOUS at 22:29

## 2018-10-15 NOTE — CP.PCM.PN
<Meagan Francis - Last Filed: 10/15/18 10:18>





Subjective





- Date & Time of Evaluation


Date of Evaluation: 10/15/18


Time of Evaluation: 09:15





- Subjective


Subjective: 





Patient seen and examined this morning at bedside, NAD, no acute event 

overnight. Patient reports she ambulated this morning, denies any BM, no issue 

voiding. Patient is NPO, reports improved abdominal pain, NG tube in place, 

reports no vomiting.  





Objective





- Vital Signs/Intake and Output


Vital Signs (last 24 hours): 


                                        











Temp Pulse Resp BP Pulse Ox


 


 97.5 F L  117 H  19   105/74   97 


 


 10/15/18 09:16  10/15/18 09:16  10/15/18 09:16  10/15/18 09:16  10/15/18 09:16








Intake and Output: 


                                        











 10/15/18 10/15/18





 06:59 18:59


 


Intake Total 1650 


 


Output Total 2000 


 


Balance -350 














- Medications


Medications: 


                               Current Medications





Dextrose (Dextrose 50% Inj)  0 ml IV STAT PRN; Protocol


   PRN Reason: Hypoglycemia Protocol


Dextrose (Glutose 15)  0 gm PO ONCE PRN; Protocol


   PRN Reason: Hypoglycemia Protocol


Enoxaparin Sodium (Lovenox)  40 mg SC DAILY JAM; Protocol


   Last Admin: 10/15/18 08:22 Dose:  40 mg


Glucagon (Glucagen Diagnostic Kit)  0 mg IM STAT PRN; Protocol


   PRN Reason: Hypoglycemia Protocol


Hydromorphone HCl (Dilaudid)  1.5 mg IVP Q3 PRN


   PRN Reason: Pain, severe (8-10)


   Last Admin: 10/15/18 07:48 Dose:  1.5 mg


Potassium Chloride (Potassium Chloride 20 Meq/100 Ml)  100 mls @ 50 mls/hr IVPB 

Q2 JAM


   Stop: 10/15/18 11:59


Insulin Human Lispro (Humalog)  0 units SC ACCU-CHECK JAM; Protocol


   Last Admin: 10/15/18 06:24 Dose:  2 units


Ketorolac Tromethamine (Toradol)  30 mg IVP Q6 JAM


   Stop: 10/18/18 16:01


   Last Admin: 10/15/18 09:55 Dose:  30 mg


Lidocaine (Lidoderm)  1 ea TD DAILY JAM


   Last Admin: 10/15/18 08:21 Dose:  1 ea


Ondansetron HCl (Zofran Inj)  2 mg IVP Q6 PRN


   PRN Reason: Nausea/Vomiting


   Last Admin: 10/15/18 05:44 Dose:  2 mg











- Labs


Labs: 


                                        





                                 10/14/18 05:45 





                                 10/15/18 05:31 





                                        











PT  13.8 Seconds (9.8-13.1)  H  10/12/18  18:39    


 


INR  1.2   10/12/18  18:39    


 


APTT  30.1 Seconds (25.6-37.1)   10/12/18  18:39    














- Constitutional


Appears: No Acute Distress





- Head Exam


Head Exam: NORMAL INSPECTION





- Eye Exam


Eye Exam: Normal appearance, PERRL


Pupil Exam: NORMAL ACCOMODATION





- ENT Exam


ENT Exam: Mucous Membranes Moist





- Neck Exam


Neck Exam: Normal Inspection





- Respiratory Exam


Respiratory Exam: Clear to Ausculation Bilateral, NORMAL BREATHING PATTERN





- Cardiovascular Exam


Cardiovascular Exam: REGULAR RHYTHM, +S1, +S2





- GI/Abdominal Exam


GI & Abdominal Exam: Soft, Tenderness (mild LLQ), Normal Bowel Sounds





- Extremities Exam


Extremities Exam: Normal Capillary Refill.  absent: Pedal Edema, Tenderness





- Back Exam


Back Exam: NORMAL INSPECTION.  absent: CVA tenderness (L), CVA tenderness (R)





- Neurological Exam


Neurological Exam: Alert, Awake, CN II-XII Intact, Oriented x3





- Psychiatric Exam


Psychiatric exam: Normal Affect





- Skin


Skin Exam: Dry, Warm





Assessment and Plan





- Assessment and Plan (Free Text)


Assessment: 





A/P: 


47 y/o F with PMH of HTN, chronic back/abdominal pain with multiple nerves 

blocks (Sees pain management as out patient), Colon cancer w/ metastasis to 

liver (on chemotherapy), Depression, Fibromyalgia, GERD admitted to the [facility] for

evaluation and treatment of SBO.





Small Bowel Obstruction possibly due to internal hernia


- Afebrile


- NGT suction: good amount of greenish output 


- CT: Mechanical small-bowel obstruction with point of obstruction identified in

the left lower quadrant of the abdomen possibly due to internal hernia


- Surgery consult, Dr. Velarde/Dr. Meehan, recommendations appreciated 


- NPO, C/w IVF, Pain control: Morphine and Hydromorphone, Zofran for nausea and 

vomiting 


- Will follow up with surgery recommendations, ambulating, NGT suction 





Hypokalemia 


- Will replete as needed 


- KCL 20 x2 ordered today





Chronic back pain 


- 2/2 metastatic colon Ca


- Chronic, H/O multiple lumbar surgeries. 


- Lidoderm patch for back pain


- Pain management consult, Dr. Winn,recommendations appreciated 


- C/w pain management: Hydromorphine 1.5mg Q3PRN





NIDDM


- Controlled 


- Last A1c 7.7 on 5/28/18


- Low dose sliding scale


- Hold metformin 


- Hypoglycemia protocol 





HTN


- Controlled


- Hold Amlodipine 10 mg QD


- Monitor vitals





Fibromyalgia


- Controlled


- Hold Gabapentin 300 mg TID


- Hold Duloxetine 60 mg PO HS





DVT prophylaxis


- SCD and Lovenox (d/c Lovenox as per surgery recommendations and plan)





<Zhane Reardon - Last Filed: 10/15/18 17:14>





Objective





- Vital Signs/Intake and Output


Vital Signs (last 24 hours): 


                                        











Temp Pulse Resp BP Pulse Ox


 


 97.5 F L  91 H  18   98/68 L  100 


 


 10/15/18 16:31  10/15/18 16:31  10/15/18 16:31  10/15/18 16:31  10/15/18 16:31








Intake and Output: 


                                        











 10/15/18 10/15/18





 06:59 18:59


 


Intake Total 1650 


 


Output Total 2000 


 


Balance -350 














- Medications


Medications: 


                               Current Medications





Dextrose (Dextrose 50% Inj)  0 ml IV STAT PRN; Protocol


   PRN Reason: Hypoglycemia Protocol


Dextrose (Glutose 15)  0 gm PO ONCE PRN; Protocol


   PRN Reason: Hypoglycemia Protocol


Enoxaparin Sodium (Lovenox)  40 mg SC DAILY JAM; Protocol


   Last Admin: 10/15/18 08:22 Dose:  40 mg


Glucagon (Glucagen Diagnostic Kit)  0 mg IM STAT PRN; Protocol


   PRN Reason: Hypoglycemia Protocol


Hydromorphone HCl (Dilaudid)  1.5 mg IVP Q3 PRN


   PRN Reason: Pain, severe (8-10)


   Last Admin: 10/15/18 17:10 Dose:  1.5 mg


Sodium Chloride (Sodium Chloride 0.9%)  1,000 mls @ 999 mls/hr IV .Q1H1M JAM


   Stop: 10/16/18 17:03


Insulin Human Lispro (Humalog)  0 units SC ACCU-CHECK JAM; Protocol


   Last Admin: 10/15/18 16:47 Dose:  Not Given


Ketorolac Tromethamine (Toradol)  30 mg IVP Q6 JAM


   Stop: 10/18/18 16:01


   Last Admin: 10/15/18 15:59 Dose:  30 mg


Lidocaine (Lidoderm)  1 ea TD DAILY JAM


   Last Admin: 10/15/18 08:21 Dose:  1 ea


Ondansetron HCl (Zofran Inj)  2 mg IVP Q6 PRN


   PRN Reason: Nausea/Vomiting


   Last Admin: 10/15/18 12:00 Dose:  2 mg











- Labs


Labs: 


                                        





                                 10/14/18 05:45 





                                 10/15/18 05:31 





                                        











PT  13.8 Seconds (9.8-13.1)  H  10/12/18  18:39    


 


INR  1.2   10/12/18  18:39    


 


APTT  30.1 Seconds (25.6-37.1)   10/12/18  18:39    














Attending/Attestation





- Attestation


I have personally seen and examined this patient.: Yes


I have fully participated in the care of the patient.: Yes


I have reviewed all pertinent clinical information, including history, physical 

exam and plan: Yes


Notes (Text): 











SBO


- still with abd gil 


- less distended


- no gas no BM


- drained 3500ml of bilious drainage overnight


- increase IVF hydration to 150 ml/hr


- IVF bolus 1 liter stat

## 2018-10-15 NOTE — CP.PCM.PN
Subjective





- Date & Time of Evaluation


Date of Evaluation: 10/15/18


Time of Evaluation: 07:00





- Subjective


Subjective: 


GENERAL SURGERY PROGRESS NOTE FOR DR. EDWARDS





Patient seen and examined at bedside. She reports feeling better than before. 

She denies nausea or vomiting. She is ambulating. Voided 3 times last night, no 

BM or flatus yet.





Objective





- Vital Signs/Intake and Output


Vital Signs (last 24 hours): 


                                        











Temp Pulse Resp BP Pulse Ox


 


 98.3 F   90   18   126/87   97 


 


 10/15/18 05:00  10/15/18 05:00  10/15/18 05:00  10/15/18 05:00  10/15/18 05:00








Intake and Output: 


                                        











 10/15/18 10/15/18





 06:59 18:59


 


Intake Total 1650 


 


Output Total 2000 


 


Balance -350 














- Medications


Medications: 


                               Current Medications





Dextrose (Dextrose 50% Inj)  0 ml IV STAT PRN; Protocol


   PRN Reason: Hypoglycemia Protocol


Dextrose (Glutose 15)  0 gm PO ONCE PRN; Protocol


   PRN Reason: Hypoglycemia Protocol


Enoxaparin Sodium (Lovenox)  40 mg SC DAILY JAM; Protocol


   Last Admin: 10/14/18 16:44 Dose:  40 mg


Glucagon (Glucagen Diagnostic Kit)  0 mg IM STAT PRN; Protocol


   PRN Reason: Hypoglycemia Protocol


Hydromorphone HCl (Dilaudid)  1.5 mg IVP Q3 PRN


   PRN Reason: Pain, severe (8-10)


   Last Admin: 10/15/18 05:19 Dose:  1.5 mg


Potassium Chloride/Dextrose/Sod Cl (Potassium Chl 20 Meq In D5-1/2ns)  1,000 mls

@ 125 mls/hr IV .Q8H JAM


   Stop: 10/15/18 08:39


   Last Admin: 10/15/18 00:45 Dose:  Not Given


Potassium Chloride (Potassium Chloride 20 Meq/100 Ml)  100 mls @ 50 mls/hr IVPB 

Q2 JAM


   Stop: 10/15/18 11:59


Insulin Human Lispro (Humalog)  0 units SC ACCU-CHECK JAM; Protocol


   Last Admin: 10/15/18 06:24 Dose:  2 units


Ketorolac Tromethamine (Toradol)  30 mg IVP Q6 JAM


   Stop: 10/18/18 16:01


   Last Admin: 10/15/18 04:06 Dose:  30 mg


Lidocaine (Lidoderm)  1 ea TD DAILY JAM


   Last Admin: 10/14/18 08:54 Dose:  1 ea


Ondansetron HCl (Zofran Inj)  2 mg IVP Q6 PRN


   PRN Reason: Nausea/Vomiting


   Last Admin: 10/15/18 05:44 Dose:  2 mg











- Labs


Labs: 


                                        





                                 10/14/18 05:45 





                                 10/15/18 05:31 





                                        











PT  13.8 Seconds (9.8-13.1)  H  10/12/18  18:39    


 


INR  1.2   10/12/18  18:39    


 


APTT  30.1 Seconds (25.6-37.1)   10/12/18  18:39    














- Constitutional


Appears: Non-toxic, No Acute Distress





- Head Exam


Head Exam: ATRAUMATIC, NORMAL INSPECTION





- Eye Exam


Eye Exam: EOMI, Normal appearance





- Respiratory Exam


Respiratory Exam: Respiratory Distress, NORMAL BREATHING PATTERN





- Cardiovascular Exam


Cardiovascular Exam: +S1, +S2





- GI/Abdominal Exam


GI & Abdominal Exam: Soft.  absent: Distended, Firm, Guarding, Rigid, 

Tenderness, Rebound


Additional comments: 


NG tube = 2L output overnight, 1500cc from day shift





- Neurological Exam


Neurological Exam: Alert, Awake, Oriented x3





- Psychiatric Exam


Psychiatric exam: Normal Affect, Normal Mood





- Skin


Skin Exam: Dry, Normal Color





Assessment and Plan





- Assessment and Plan (Free Text)


Assessment: 


48F w. hx of metastatic colon CA s/p L colectomy and oopherectomy presenting 

with SBO 2/2 possible internal hernia





- NGT: 2L output over 12 hour night shift


- Keep NGT to suction


- Head of bed 30 degrees


- Limit narcotics


- Clamp NGT 15-20min and encourage ambulation TID


- Continue strict NPO


- Serial abd exams


- Hypokalemia - replaced by medical team


- Discussed plan with Dr. Zoran Manrique PGY-4

## 2018-10-16 LAB
ALBUMIN SERPL-MCNC: 4.2 G/DL (ref 3.5–5)
ALBUMIN/GLOB SERPL: 1 {RATIO} (ref 1–2.1)
ALT SERPL-CCNC: 66 U/L (ref 9–52)
ARTERIAL BLOOD GAS HEMOGLOBIN: 14.6 G/DL (ref 11.7–17.4)
ARTERIAL BLOOD GAS O2 SAT: 98.8 % (ref 95–98)
ARTERIAL BLOOD GAS PCO2: 48 MM/HG (ref 35–45)
ARTERIAL BLOOD GAS TCO2: 41.6 MMOL/L (ref 22–28)
ARTERIAL PATENCY WRIST A: YES
AST SERPL-CCNC: 58 U/L (ref 14–36)
BUN SERPL-MCNC: 21 MG/DL (ref 7–17)
CALCIUM SERPL-MCNC: 9.7 MG/DL (ref 8.4–10.2)
ERYTHROCYTE [DISTWIDTH] IN BLOOD BY AUTOMATED COUNT: 15.8 % (ref 11.5–14.5)
GFR NON-AFRICAN AMERICAN: > 60
HCO3 BLDA-SCNC: 36.6 MMOL/L (ref 21–28)
HGB BLD-MCNC: 14.5 G/DL (ref 12–16)
INHALED O2 CONCENTRATION: 21 %
MCH RBC QN AUTO: 28.4 PG (ref 27–31)
MCHC RBC AUTO-ENTMCNC: 34 G/DL (ref 33–37)
MCV RBC AUTO: 83.5 FL (ref 81–99)
O2 CAP BLDA-SCNC: 19.4 ML/DL (ref 16–24)
O2 CT BLDA-SCNC: 19.2 ML/DL (ref 15–23)
PH BLDA: 7.53 [PH] (ref 7.35–7.45)
PLATELET # BLD: 238 K/UL (ref 130–400)
PO2 BLDA: 76 MM/HG (ref 80–100)
RBC # BLD AUTO: 5.11 MIL/UL (ref 3.8–5.2)
WBC # BLD AUTO: 7.6 K/UL (ref 4.8–10.8)

## 2018-10-16 RX ADMIN — INSULIN LISPRO SCH: 100 INJECTION, SOLUTION INTRAVENOUS; SUBCUTANEOUS at 12:00

## 2018-10-16 RX ADMIN — ENOXAPARIN SODIUM SCH MG: 40 INJECTION SUBCUTANEOUS at 09:04

## 2018-10-16 RX ADMIN — POTASSIUM CHLORIDE, DEXTROSE MONOHYDRATE AND SODIUM CHLORIDE SCH MLS/HR: 300; 5; 450 INJECTION, SOLUTION INTRAVENOUS at 20:25

## 2018-10-16 RX ADMIN — INSULIN LISPRO SCH: 100 INJECTION, SOLUTION INTRAVENOUS; SUBCUTANEOUS at 23:01

## 2018-10-16 RX ADMIN — POTASSIUM CHLORIDE, DEXTROSE MONOHYDRATE AND SODIUM CHLORIDE SCH: 300; 5; 450 INJECTION, SOLUTION INTRAVENOUS at 23:29

## 2018-10-16 RX ADMIN — POTASSIUM PHOSPHATE, MONOBASIC AND POTASSIUM PHOSPHATE, DIBASIC SCH MLS/HR: 224; 236 INJECTION, SOLUTION, CONCENTRATE INTRAVENOUS at 10:15

## 2018-10-16 RX ADMIN — INSULIN LISPRO SCH: 100 INJECTION, SOLUTION INTRAVENOUS; SUBCUTANEOUS at 16:14

## 2018-10-16 RX ADMIN — POTASSIUM CHLORIDE, DEXTROSE MONOHYDRATE AND SODIUM CHLORIDE SCH: 150; 5; 450 INJECTION, SOLUTION INTRAVENOUS at 00:00

## 2018-10-16 RX ADMIN — POTASSIUM PHOSPHATE, MONOBASIC AND POTASSIUM PHOSPHATE, DIBASIC SCH MLS/HR: 224; 236 INJECTION, SOLUTION, CONCENTRATE INTRAVENOUS at 11:30

## 2018-10-16 RX ADMIN — INSULIN LISPRO SCH UNITS: 100 INJECTION, SOLUTION INTRAVENOUS; SUBCUTANEOUS at 09:00

## 2018-10-16 NOTE — CP.PCM.PN
<Tamara Klein - Last Filed: 10/16/18 10:31>





Subjective





- Date & Time of Evaluation


Date of Evaluation: 10/16/18


Time of Evaluation: 09:14





- Subjective


Subjective: 





Overnight pt had one small BM and passed gas. Pt was able to ambulate yesterday.

Pt seen and examined by bedside this AM. States that her abdominal pain is 

better then previously. Tolerating ice chips when her throat is dry. 





Objective





- Vital Signs/Intake and Output


Vital Signs (last 24 hours): 


                                        











Temp Pulse Resp BP Pulse Ox


 


 98.2 F   111 H  20   107/75   98 


 


 10/16/18 08:19  10/16/18 08:19  10/16/18 08:19  10/16/18 08:19  10/16/18 08:19








Intake and Output: 


                                        











 10/16/18 10/16/18





 06:59 18:59


 


Intake Total 1000 


 


Output Total 2300 


 


Balance -1300 














- Medications


Medications: 


                               Current Medications





Dextrose (Dextrose 50% Inj)  0 ml IV STAT PRN; Protocol


   PRN Reason: Hypoglycemia Protocol


Dextrose (Glutose 15)  0 gm PO ONCE PRN; Protocol


   PRN Reason: Hypoglycemia Protocol


Enoxaparin Sodium (Lovenox)  40 mg SC DAILY JAM; Protocol


   Last Admin: 10/16/18 09:04 Dose:  40 mg


Famotidine (Pepcid)  20 mg IVP Q12 JAM


Glucagon (Glucagen Diagnostic Kit)  0 mg IM STAT PRN; Protocol


   PRN Reason: Hypoglycemia Protocol


Hydromorphone HCl (Dilaudid)  1 mg IVP Q3 PRN


   PRN Reason: Pain, severe (8-10)


Sodium Chloride (Sodium Chloride 0.9%)  1,000 mls @ 999 mls/hr IV .Q1H1M JAM


   Stop: 10/16/18 17:03


   Last Admin: 10/16/18 08:55 Dose:  999 mls/hr


Potassium Chloride (Potassium Chloride 20 Meq/100 Ml)  100 mls @ 50 mls/hr IVPB 

Q2 JAM


   Stop: 10/16/18 09:59


Sodium Chloride (Sodium Chloride 0.9%)  1,000 mls @ 999 mls/hr IV .Q1H1M JAM


   Stop: 10/17/18 08:14


Potassium Chloride/Dextrose/Sod Cl (Potassium Chl 40 Meq In D5-1/2ns)  1,000 mls

@ 150 mls/hr IV .Q6H40M JAM


   Stop: 10/16/18 20:00


Insulin Human Lispro (Humalog)  0 units SC ACCU-CHECK JAM; Protocol


   Last Admin: 10/16/18 09:00 Dose:  2 units


Ketorolac Tromethamine (Toradol)  30 mg IVP Q6 [facility]


   Stop: 10/18/18 16:01


   Last Admin: 10/16/18 04:17 Dose:  30 mg


Lidocaine (Lidoderm)  1 ea TD DAILY JAM


   Last Admin: 10/16/18 09:03 Dose:  1 ea


Ondansetron HCl (Zofran Inj)  2 mg IVP Q6 PRN


   PRN Reason: Nausea/Vomiting


   Last Admin: 10/16/18 04:22 Dose:  2 mg











- Labs


Labs: 


                                        





                                 10/16/18 05:30 





                                 10/16/18 05:30 





                                        











PT  13.8 Seconds (9.8-13.1)  H  10/12/18  18:39    


 


INR  1.2   10/12/18  18:39    


 


APTT  30.1 Seconds (25.6-37.1)   10/12/18  18:39    














- Constitutional


Appears: No Acute Distress, Other (NGT in wall suction-draining greenish fluid 

approx 400cc in bag )





- Head Exam


Head Exam: ATRAUMATIC





- Eye Exam


Eye Exam: EOMI





- ENT Exam


ENT Exam: Mucous Membranes Dry





- Respiratory Exam


Respiratory Exam: Clear to Ausculation Bilateral, NORMAL BREATHING PATTERN.  

absent: Wheezes





- Cardiovascular Exam


Cardiovascular Exam: REGULAR RHYTHM, +S1, +S2





- GI/Abdominal Exam


GI & Abdominal Exam: Soft, Normal Bowel Sounds.  absent: Tenderness


Additional comments: 





multiple old surgical scars noted, well healed





- Extremities Exam


Extremities Exam: absent: Calf Tenderness, Pedal Edema





- Neurological Exam


Neurological Exam: Alert, Awake





- Skin


Skin Exam: Dry, Normal Color, Warm





Assessment and Plan





- Assessment and Plan (Free Text)


Assessment: 





Assessment/Plan:


49 YO Female with PMHx of HTN, chronic back/abdominal pain with multiple nerves 

blocks (pain management as out patient), Colon cancer w/ metastasis to liver (on

chemotherapy), Depression, Fibromyalgia, GERD admitted for SBO.





Small Bowel Obstruction possibly due to internal hernia


- Afebrile


- NGT suction: good amount of greenish output 


- CT: Mechanical small-bowel obstruction with point of obstruction identified in

the left lower quadrant of the abdomen possibly due to internal hernia


- Surgery consult, Dr. Velarde/Dr. Meehan, recommendations appreciated 


- NPO, C/w IVF, Pain control: Morphine and Hydromorphone, Zofran for nausea and 

vomiting 


- Will follow up with surgery recommendations, ambulating, NGT suction 





Hypokalemia/Dehydration  


- KCL 20 x2 ordered today


- KCL 40meq d5, ns @ 150ml/hr


- 1x 1L NS bolus


- ABG pending 





Chronic back pain 


- 2/2 metastatic colon Ca


- Chronic, H/O multiple lumbar surgeries. 


- Lidoderm patch for back pain


- Pain management consult, Dr. Winn,recommendations appreciated 


- C/w pain management: Hydromorphine 1.5mg Q3PRN





NIDDM


- Controlled 


- Last A1c 7.7 on 5/28/18


- Low dose sliding scale


- Hold metformin 


- Hypoglycemia protocol 





HTN


- Controlled


- Hold Amlodipine 10 mg QD


- Monitor vitals





Fibromyalgia


- Controlled


- Hold Gabapentin 300 mg TID


- Hold Duloxetine 60 mg PO HS





DVT prophylaxis


- SCD and Lovenox (d/c Lovenox as per surgery recommendations and plan)





<Zhane Reardon - Last Filed: 10/16/18 16:15>





Objective





- Vital Signs/Intake and Output


Vital Signs (last 24 hours): 


                                        











Temp Pulse Resp BP Pulse Ox


 


 98.2 F   111 H  20   107/75   98 


 


 10/16/18 08:19  10/16/18 08:19  10/16/18 08:19  10/16/18 08:19  10/16/18 08:19








Intake and Output: 


                                        











 10/16/18 10/16/18





 06:59 18:59


 


Intake Total 1000 


 


Output Total 2300 


 


Balance -1300 














- Medications


Medications: 


                               Current Medications





Dextrose (Dextrose 50% Inj)  0 ml IV STAT PRN; Protocol


   PRN Reason: Hypoglycemia Protocol


Dextrose (Glutose 15)  0 gm PO ONCE PRN; Protocol


   PRN Reason: Hypoglycemia Protocol


Enoxaparin Sodium (Lovenox)  40 mg SC DAILY JAM; Protocol


   Last Admin: 10/16/18 09:04 Dose:  40 mg


Famotidine (Pepcid)  20 mg IVP Q12 J[facility]


   Last Admin: 10/16/18 09:03 Dose:  20 mg


Glucagon (Glucagen Diagnostic Kit)  0 mg IM STAT PRN; Protocol


   PRN Reason: Hypoglycemia Protocol


Hydromorphone HCl (Dilaudid)  1 mg IVP Q3 PRN


   PRN Reason: Pain, severe (8-10)


   Last Admin: 10/16/18 14:36 Dose:  1 mg


Sodium Chloride (Sodium Chloride 0.9%)  1,000 mls @ 999 mls/hr IV .Q1H1M JAM


   Stop: 10/16/18 17:03


   Last Admin: 10/16/18 08:55 Dose:  999 mls/hr


Sodium Chloride (Sodium Chloride 0.9%)  1,000 mls @ 999 mls/hr IV .Q1H1M JAM


   Stop: 10/17/18 08:14


Potassium Chloride/Dextrose/Sod Cl (Potassium Chl 40 Meq In D5-1/2ns)  1,000 mls

@ 150 mls/hr IV .Q6H40M JAM


   Last Admin: 10/16/18 12:42 Dose:  150 mls/hr


Insulin Human Lispro (Humalog)  0 units SC ACCU-CHECK JAM; Protocol


   Last Admin: 10/16/18 12:00 Dose:  Not Given


Ketorolac Tromethamine (Toradol)  30 mg IVP Q6 [facility]


   Stop: 10/18/18 16:01


   Last Admin: 10/16/18 15:58 Dose:  30 mg


Lidocaine (Lidoderm)  1 ea TD DAILY JAM


   Last Admin: 10/16/18 09:03 Dose:  1 ea


Ondansetron HCl (Zofran Inj)  2 mg IVP Q6 PRN


   PRN Reason: Nausea/Vomiting


   Last Admin: 10/16/18 15:58 Dose:  2 mg











- Labs


Labs: 


                                        





                                 10/16/18 05:30 





                                 10/16/18 05:30 





                                        











PT  13.8 Seconds (9.8-13.1)  H  10/12/18  18:39    


 


INR  1.2   10/12/18  18:39    


 


APTT  30.1 Seconds (25.6-37.1)   10/12/18  18:39    














Attending/Attestation





- Attestation


I have personally seen and examined this patient.: Yes


I have fully participated in the care of the patient.: Yes


I have reviewed all pertinent clinical information, including history, physical 

exam and plan: Yes

## 2018-10-16 NOTE — CP.PCM.PN
Subjective





- Date & Time of Evaluation


Date of Evaluation: 10/16/18


Time of Evaluation: 08:31





- Subjective


Subjective: 





Surgery: Dr. Meehan





Pt seen and examined. Resting comfortably in bed. Pt states that she is feeling 

much better this AM. Pain is significantly improved. No further episodes of 

vomiting. +Flatus and BM.





Objective





- Vital Signs/Intake and Output


Vital Signs (last 24 hours): 


                                        











Temp Pulse Resp BP Pulse Ox


 


 98.2 F   111 H  20   107/75   98 


 


 10/16/18 08:19  10/16/18 08:19  10/16/18 08:19  10/16/18 08:19  10/16/18 08:19








Intake and Output: 


                                        











 10/16/18 10/16/18





 06:59 18:59


 


Intake Total 1000 


 


Output Total 2300 


 


Balance -1300 














- Medications


Medications: 


                               Current Medications





Dextrose (Dextrose 50% Inj)  0 ml IV STAT PRN; Protocol


   PRN Reason: Hypoglycemia Protocol


Dextrose (Glutose 15)  0 gm PO ONCE PRN; Protocol


   PRN Reason: Hypoglycemia Protocol


Enoxaparin Sodium (Lovenox)  40 mg SC DAILY JAM; Protocol


   Last Admin: 10/15/18 08:22 Dose:  40 mg


Glucagon (Glucagen Diagnostic Kit)  0 mg IM STAT PRN; Protocol


   PRN Reason: Hypoglycemia Protocol


Hydromorphone HCl (Dilaudid)  1.5 mg IVP Q3 PRN


   PRN Reason: Pain, severe (8-10)


   Last Admin: 10/16/18 04:55 Dose:  1.5 mg


Sodium Chloride (Sodium Chloride 0.9%)  1,000 mls @ 999 mls/hr IV .Q1H1M [facility]


   Stop: 10/16/18 17:03


   Last Admin: 10/15/18 17:37 Dose:  999 mls/hr


Potassium Chloride (Potassium Chloride 20 Meq/100 Ml)  100 mls @ 50 mls/hr IVPB 

Q2 JAM


   Stop: 10/16/18 09:59


Sodium Chloride (Sodium Chloride 0.9%)  1,000 mls @ 999 mls/hr IV .Q1H1M [facility]


   Stop: 10/17/18 08:14


Potassium Chloride/Dextrose/Sod Cl (Potassium Chl 40 Meq In D5-1/2ns)  1,000 mls

@ 150 mls/hr IV .Q6H40M [facility]


   Stop: 10/16/18 20:00


Insulin Human Lispro (Humalog)  0 units SC ACCU-CHECK JAM; Protocol


   Last Admin: 10/15/18 22:29 Dose:  Not Given


Ketorolac Tromethamine (Toradol)  30 mg IVP Q6 [facility]


   Stop: 10/18/18 16:01


   Last Admin: 10/16/18 04:17 Dose:  30 mg


Lidocaine (Lidoderm)  1 ea TD DAILY JAM


   Last Admin: 10/15/18 08:21 Dose:  1 ea


Ondansetron HCl (Zofran Inj)  2 mg IVP Q6 PRN


   PRN Reason: Nausea/Vomiting


   Last Admin: 10/16/18 04:22 Dose:  2 mg











- Labs


Labs: 


                                        





                                 10/16/18 05:30 





                                 10/16/18 05:30 





                                        











PT  13.8 Seconds (9.8-13.1)  H  10/12/18  18:39    


 


INR  1.2   10/12/18  18:39    


 


APTT  30.1 Seconds (25.6-37.1)   10/12/18  18:39    














- Constitutional


Appears: Non-toxic, No Acute Distress





- Head Exam


Head Exam: ATRAUMATIC, NORMOCEPHALIC





- Eye Exam


Eye Exam: EOMI





- ENT Exam


ENT Exam: Mucous Membranes Moist





- Neck Exam


Neck Exam: Full ROM





- Respiratory Exam


Respiratory Exam: NORMAL BREATHING PATTERN.  absent: Accessory Muscle Use, 

Respiratory Distress





- GI/Abdominal Exam


GI & Abdominal Exam: Soft.  absent: Distended, Firm, Guarding, Rigid, 

Tenderness, Rebound





- Extremities Exam


Extremities Exam: absent: Calf Tenderness, Pedal Edema





- Neurological Exam


Neurological Exam: Alert, Oriented x3





Assessment and Plan





- Assessment and Plan (Free Text)


Assessment: 





48F w. hx of metastatic colon CA s/p L colectomy and oopherectomy presenting 

with SBO 2/2 possible internal hernia


-NGT: 2100cc/12hr, light bilious


-Keep NGT to sxn, clamp 15-20min TID and encourage ambulation


-Keep NPO


-serial abd exams


-Replete K


-Wean narcotics as tolerated


-GI/DVT ppx


-d/w attending





Florinda PGY4

## 2018-10-17 LAB
ALBUMIN SERPL-MCNC: 4.1 G/DL (ref 3.5–5)
ALBUMIN/GLOB SERPL: 1 {RATIO} (ref 1–2.1)
ALT SERPL-CCNC: 64 U/L (ref 9–52)
AST SERPL-CCNC: 54 U/L (ref 14–36)
BUN SERPL-MCNC: 15 MG/DL (ref 7–17)
CALCIUM SERPL-MCNC: 9.6 MG/DL (ref 8.4–10.2)
GFR NON-AFRICAN AMERICAN: > 60

## 2018-10-17 RX ADMIN — INSULIN LISPRO SCH: 100 INJECTION, SOLUTION INTRAVENOUS; SUBCUTANEOUS at 22:00

## 2018-10-17 RX ADMIN — POTASSIUM CHLORIDE, DEXTROSE MONOHYDRATE AND SODIUM CHLORIDE SCH: 300; 5; 450 INJECTION, SOLUTION INTRAVENOUS at 04:41

## 2018-10-17 RX ADMIN — ENOXAPARIN SODIUM SCH MG: 40 INJECTION SUBCUTANEOUS at 10:00

## 2018-10-17 RX ADMIN — INSULIN LISPRO SCH UNITS: 100 INJECTION, SOLUTION INTRAVENOUS; SUBCUTANEOUS at 12:42

## 2018-10-17 RX ADMIN — DEXTROSE MONOHYDRATE, SODIUM CHLORIDE, AND POTASSIUM CHLORIDE SCH: 50; 9; 1.49 INJECTION, SOLUTION INTRAVENOUS at 21:00

## 2018-10-17 RX ADMIN — INSULIN LISPRO SCH: 100 INJECTION, SOLUTION INTRAVENOUS; SUBCUTANEOUS at 17:07

## 2018-10-17 RX ADMIN — POTASSIUM CHLORIDE, DEXTROSE MONOHYDRATE AND SODIUM CHLORIDE SCH MLS/HR: 300; 5; 450 INJECTION, SOLUTION INTRAVENOUS at 01:39

## 2018-10-17 RX ADMIN — INSULIN LISPRO SCH UNITS: 100 INJECTION, SOLUTION INTRAVENOUS; SUBCUTANEOUS at 07:35

## 2018-10-17 NOTE — CP.PCM.PN
<Tamara Klein - Last Filed: 10/17/18 10:54>





Subjective





- Date & Time of Evaluation


Date of Evaluation: 10/17/18


Time of Evaluation: 08:45





- Subjective


Subjective: 





Overnight pt felt that her NGT was falling off, she pulled it off. Is passing 

gas, endorsing mild abdominal pain but controlled with PO meds. Pt remains NPO, 

took a shower this AM, overall feeling better. 





Objective





- Vital Signs/Intake and Output


Vital Signs (last 24 hours): 


                                        











Temp Pulse Resp BP Pulse Ox


 


 97.7 F   108 H  19   104/73   96 


 


 10/17/18 08:15  10/17/18 08:15  10/17/18 08:15  10/17/18 08:15  10/17/18 08:15








Intake and Output: 


                                        











 10/17/18 10/17/18





 06:59 18:59


 


Intake Total 2400 


 


Output Total 1400 


 


Balance 1000 














- Medications


Medications: 


                               Current Medications





Dextrose (Dextrose 50% Inj)  0 ml IV STAT PRN; Protocol


   PRN Reason: Hypoglycemia Protocol


Dextrose (Glutose 15)  0 gm PO ONCE PRN; Protocol


   PRN Reason: Hypoglycemia Protocol


Enoxaparin Sodium (Lovenox)  40 mg SC DAILY JAM; Protocol


   Last Admin: 10/17/18 10:00 Dose:  40 mg


Famotidine (Pepcid)  20 mg IVP Q12 [facility]


   Last Admin: 10/17/18 10:01 Dose:  20 mg


Glucagon (Glucagen Diagnostic Kit)  0 mg IM STAT PRN; Protocol


   PRN Reason: Hypoglycemia Protocol


Hydromorphone HCl (Dilaudid)  1 mg IVP Q3 PRN


   PRN Reason: Pain, severe (8-10)


   Last Admin: 10/17/18 10:35 Dose:  1 mg


Potassium Chloride/Dextrose/Sod Cl (Potassium Chl 20 Meq In D5-Ns)  1,000 mls @ 

200 mls/hr IV .Q5H JAM


Potassium Chloride (Potassium Chloride 20 Meq/100 Ml)  100 mls @ 50 mls/hr IVPB 

ONCE ONE


   Stop: 10/17/18 12:04


Insulin Human Lispro (Humalog)  0 units SC ACCU-CHECK JAM; Protocol


   Last Admin: 10/17/18 07:35 Dose:  2 units


Ketorolac Tromethamine (Toradol)  30 mg IVP Q6 [facility]


   Stop: 10/18/18 16:01


   Last Admin: 10/17/18 10:01 Dose:  30 mg


Lidocaine (Lidoderm)  1 ea TD DAILY J[facility]


   Last Admin: 10/17/18 09:59 Dose:  1 ea


Ondansetron HCl (Zofran Inj)  2 mg IVP Q6 PRN


   PRN Reason: Nausea/Vomiting


   Last Admin: 10/17/18 05:48 Dose:  2 mg











- Labs


Labs: 


                                        





                                 10/16/18 05:30 





                                 10/17/18 06:26 





                                        











PT  13.8 Seconds (9.8-13.1)  H  10/12/18  18:39    


 


INR  1.2   10/12/18  18:39    


 


APTT  30.1 Seconds (25.6-37.1)   10/12/18  18:39    














- Constitutional


Appears: No Acute Distress





- Head Exam


Head Exam: NORMAL INSPECTION





- ENT Exam


ENT Exam: Mucous Membranes Dry





- Respiratory Exam


Respiratory Exam: Clear to Ausculation Bilateral, NORMAL BREATHING PATTERN.  

absent: Wheezes





- Cardiovascular Exam


Cardiovascular Exam: REGULAR RHYTHM, +S1, +S2





- GI/Abdominal Exam


GI & Abdominal Exam: Soft, Hyperactive Bowel Sounds.  absent: Guarding, Rigid, 

Tenderness


Additional comments: 


Multiple surgical scars noted in abdomen, well healed, old 





- Extremities Exam


Extremities Exam: Normal Inspection.  absent: Calf Tenderness





- Neurological Exam


Neurological Exam: Alert, Awake





Assessment and Plan





- Assessment and Plan (Free Text)


Assessment: 





Assessment/Plan:


47 YO Female with PMHx of HTN, chronic back/abdominal pain with multiple nerves 

blocks (pain management as out patient), Colon cancer w/ metastasis to liver (on

chemotherapy), Depression, Fibromyalgia, GERD admitted for SBO.





Small Bowel Obstruction possibly due to internal hernia


- NGT removed last night 


- CT: Mechanical small-bowel obstruction with point of obstruction identified in

the left lower quadrant of the abdomen possibly due to internal hernia


- Surgery consult, Dr. Velarde/Dr. Meehan, recommendations appreciated 


- NPO, C/w IVF, Pain control: Morphine and Hydromorphone, Zofran for nausea and 

vomiting 


- Will follow up with surgery recommendations, ambulating





Hypokalemia/Dehydration  


- Improving


- KCL 20 x1 ordered today


- KCL 20meq d5, ns @ 200ml/hr


- consider clear liquids if cleared by Surgery 





Chronic back pain 


- 2/2 metastatic colon Ca


- Chronic, H/O multiple lumbar surgeries. 


- Lidoderm patch for back pain


- Pain management consult, Dr. Winn, recommendations appreciated 


- C/w pain management: Hydromorphine 1.5mg Q3PRN





NIDDM


- Controlled 


- Last A1c 7.7 on 5/28/18


- Low dose sliding scale


- Hold metformin 


- Hypoglycemia protocol 





HTN


- Controlled


- Hold Amlodipine 10 mg QD


- Monitor vitals





Fibromyalgia


- Controlled


- Hold Gabapentin 300 mg TID


- Hold Duloxetine 60 mg PO HS





DVT


- Lovenox SC





<Zhane Reardon - Last Filed: 10/17/18 17:14>





Objective





- Vital Signs/Intake and Output


Vital Signs (last 24 hours): 


                                        











Temp Pulse Resp BP Pulse Ox


 


 97.7 F   70   18   118/84   98 


 


 10/17/18 16:47  10/17/18 16:47  10/17/18 16:47  10/17/18 16:47  10/17/18 16:47








Intake and Output: 


                                        











 10/17/18 10/17/18





 06:59 18:59


 


Intake Total 2400 


 


Output Total 1400 


 


Balance 1000 














- Medications


Medications: 


                               Current Medications





Dextrose (Dextrose 50% Inj)  0 ml IV STAT PRN; Protocol


   PRN Reason: Hypoglycemia Protocol


Dextrose (Glutose 15)  0 gm PO ONCE PRN; Protocol


   PRN Reason: Hypoglycemia Protocol


Enoxaparin Sodium (Lovenox)  40 mg SC DAILY JAM; Protocol


   Last Admin: 10/17/18 10:00 Dose:  40 mg


Famotidine (Pepcid)  20 mg IVP Q12 [facility]


   Last Admin: 10/17/18 10:01 Dose:  20 mg


Glucagon (Glucagen Diagnostic Kit)  0 mg IM STAT PRN; Protocol


   PRN Reason: Hypoglycemia Protocol


Hydromorphone HCl (Dilaudid)  1 mg IVP Q3 PRN


   PRN Reason: Pain, severe (8-10)


   Last Admin: 10/17/18 13:58 Dose:  1 mg


Potassium Chloride/Dextrose/Sod Cl (Potassium Chl 20 Meq In D5-Ns)  1,000 mls @ 

150 mls/hr IV .Q6H40M JAM


Insulin Human Lispro (Humalog)  0 units SC ACCU-CHECK JAM; Protocol


   Last Admin: 10/17/18 12:42 Dose:  2 units


Ketorolac Tromethamine (Toradol)  30 mg IVP Q6 [facility]


   Stop: 10/18/18 16:01


   Last Admin: 10/17/18 16:31 Dose:  30 mg


Lidocaine (Lidoderm)  1 ea TD DAILY JAM


   Last Admin: 10/17/18 09:59 Dose:  1 ea


Ondansetron HCl (Zofran Inj)  2 mg IVP Q6 PRN


   PRN Reason: Nausea/Vomiting


   Last Admin: 10/17/18 05:48 Dose:  2 mg


Potassium Chloride (Potassium Chloride Oral Soln)  20 meq PO DAILY JAM


   Stop: 10/21/18 09:01











- Labs


Labs: 


                                        





                                 10/16/18 05:30 





                                 10/17/18 06:26 





                                        











PT  13.8 Seconds (9.8-13.1)  H  10/12/18  18:39    


 


INR  1.2   10/12/18  18:39    


 


APTT  30.1 Seconds (25.6-37.1)   10/12/18  18:39    














Attending/Attestation





- Attestation


I have personally seen and examined this patient.: Yes


I have fully participated in the care of the patient.: Yes


I have reviewed all pertinent clinical information, including history, physical 

exam and plan: Yes


Notes (Text): 





SBO sec to Internal Hernia


- today pt had BM (diarrhea) and flatus 


- NGT out


- still with some abd pain but better, Abd soft, + BS sound


- will gradually start dit





Metastatic Colon CA


- Pain mgt by Dr Winn


- ff up with Oncology at [facility] asap on d/c





Hypokalemia sec to GI Loss

## 2018-10-17 NOTE — CP.PCM.PN
Subjective





- Date & Time of Evaluation


Date of Evaluation: 10/17/18


Time of Evaluation: 12:46





- Subjective


Subjective: 





Surgery: Dr. Meehan





Pt seen and examined. States that her NGT was bothering her overnight and she 

felt it was falling out so she pulled it out completely. She denies any episodes

of vomiting since NGT came out. She admits to flatus and loose BMs yesterday and

this morning. Pt is ambulating and states abdominal pain is improved but still 

present. Well controlled with pain meds at this time. Denies fevers/chills. 





Objective





- Vital Signs/Intake and Output


Vital Signs (last 24 hours): 


                                        











Temp Pulse Resp BP Pulse Ox


 


 97.7 F   108 H  19   104/73   96 


 


 10/17/18 08:15  10/17/18 08:15  10/17/18 08:15  10/17/18 08:15  10/17/18 08:15








Intake and Output: 


                                        











 10/17/18 10/17/18





 06:59 18:59


 


Intake Total 2400 


 


Output Total 1400 


 


Balance 1000 














- Medications


Medications: 


                               Current Medications





Dextrose (Dextrose 50% Inj)  0 ml IV STAT PRN; Protocol


   PRN Reason: Hypoglycemia Protocol


Dextrose (Glutose 15)  0 gm PO ONCE PRN; Protocol


   PRN Reason: Hypoglycemia Protocol


Enoxaparin Sodium (Lovenox)  40 mg SC DAILY JAM; Protocol


   Last Admin: 10/17/18 10:00 Dose:  40 mg


Famotidine (Pepcid)  20 mg IVP Q12 JAM


   Last Admin: 10/17/18 10:01 Dose:  20 mg


Glucagon (Glucagen Diagnostic Kit)  0 mg IM STAT PRN; Protocol


   PRN Reason: Hypoglycemia Protocol


Hydromorphone HCl (Dilaudid)  1 mg IVP Q3 PRN


   PRN Reason: Pain, severe (8-10)


   Last Admin: 10/17/18 10:35 Dose:  1 mg


Potassium Chloride/Dextrose/Sod Cl (Potassium Chl 20 Meq In D5-Ns)  1,000 mls @ 

200 mls/hr IV .Q5H JAM


   Last Admin: 10/17/18 11:42 Dose:  200 mls/hr


Insulin Human Lispro (Humalog)  0 units SC ACCU-CHECK JAM; Protocol


   Last Admin: 10/17/18 07:35 Dose:  2 units


Ketorolac Tromethamine (Toradol)  30 mg IVP Q6 JAM


   Stop: 10/18/18 16:01


   Last Admin: 10/17/18 10:01 Dose:  30 mg


Lidocaine (Lidoderm)  1 ea TD DAILY JAM


   Last Admin: 10/17/18 09:59 Dose:  1 ea


Ondansetron HCl (Zofran Inj)  2 mg IVP Q6 PRN


   PRN Reason: Nausea/Vomiting


   Last Admin: 10/17/18 05:48 Dose:  2 mg


Potassium Chloride (Potassium Chloride Oral Soln)  20 meq PO DAILY JAM


   Stop: 10/21/18 09:01











- Labs


Labs: 


                                        





                                 10/16/18 05:30 





                                 10/17/18 06:26 





                                        











PT  13.8 Seconds (9.8-13.1)  H  10/12/18  18:39    


 


INR  1.2   10/12/18  18:39    


 


APTT  30.1 Seconds (25.6-37.1)   10/12/18  18:39    














- Constitutional


Appears: Well, No Acute Distress





- Eye Exam


Eye Exam: Normal appearance





- ENT Exam


ENT Exam: Mucous Membranes Moist





- Respiratory Exam


Respiratory Exam: NORMAL BREATHING PATTERN





- Cardiovascular Exam


Cardiovascular Exam: Tachycardia





- GI/Abdominal Exam


GI & Abdominal Exam: Soft.  absent: Distended, Guarding, Tenderness, Rebound





- Neurological Exam


Neurological Exam: Alert, Awake, Oriented x3





- Skin


Skin Exam: Dry, Warm





Assessment and Plan





- Assessment and Plan (Free Text)


Assessment: 





48F with Hx of metastatic Colon CA s/p L colectomy & oopherectomy in 2016 p

resenting with SBO 


Plan: 





- start CLD and will advance slowly


- continue to monitor bowel function


- serial abdominal exams


- encourage ambulation


- wean narcotics as tolerated


- d/w Dr. Zoran Koroma

## 2018-10-17 NOTE — CP.PCM.PN
Subjective





- Date & Time of Evaluation


Date of Evaluation: 10/17/18


Time of Evaluation: 08:30





- Subjective


Subjective: 


Patient has improved clinically, positive flatus and BM.  Pain has improved as 

well.  Still on Dilaudid IV and Toradol.





NGT was accidentally removed, awaiting replacement.





Objective





- Vital Signs/Intake and Output


Vital Signs (last 24 hours): 


                                        











Temp Pulse Resp BP Pulse Ox


 


 97.7 F   108 H  19   104/73   96 


 


 10/17/18 08:15  10/17/18 08:15  10/17/18 08:15  10/17/18 08:15  10/17/18 08:15








Intake and Output: 


                                        











 10/17/18 10/17/18





 06:59 18:59


 


Intake Total 2400 


 


Output Total 1400 


 


Balance 1000 














- Medications


Medications: 


                               Current Medications





Dextrose (Dextrose 50% Inj)  0 ml IV STAT PRN; Protocol


   PRN Reason: Hypoglycemia Protocol


Dextrose (Glutose 15)  0 gm PO ONCE PRN; Protocol


   PRN Reason: Hypoglycemia Protocol


Enoxaparin Sodium (Lovenox)  40 mg SC DAILY JAM; Protocol


   Last Admin: 10/16/18 09:04 Dose:  40 mg


Famotidine (Pepcid)  20 mg IVP Q12 JAM


   Last Admin: 10/16/18 22:59 Dose:  Not Given


Glucagon (Glucagen Diagnostic Kit)  0 mg IM STAT PRN; Protocol


   PRN Reason: Hypoglycemia Protocol


Hydromorphone HCl (Dilaudid)  1 mg IVP Q3 PRN


   PRN Reason: Pain, severe (8-10)


   Last Admin: 10/17/18 06:44 Dose:  1 mg


Potassium Chloride/Dextrose/Sod Cl (Potassium Chl 40 Meq In D5-1/2ns)  1,000 mls

@ 200 mls/hr IV .Q5H JAM


   Last Admin: 10/17/18 04:41 Dose:  Not Given


Insulin Human Lispro (Humalog)  0 units SC ACCU-CHECK JAM; Protocol


   Last Admin: 10/16/18 23:01 Dose:  Not Given


Ketorolac Tromethamine (Toradol)  30 mg IVP Q6 JAM


   Stop: 10/18/18 16:01


   Last Admin: 10/17/18 05:34 Dose:  30 mg


Lidocaine (Lidoderm)  1 ea TD DAILY JAM


   Last Admin: 10/16/18 09:03 Dose:  1 ea


Ondansetron HCl (Zofran Inj)  2 mg IVP Q6 PRN


   PRN Reason: Nausea/Vomiting


   Last Admin: 10/17/18 05:48 Dose:  2 mg











- Labs


Labs: 


                                        





                                 10/16/18 05:30 





                                 10/17/18 06:26 





                                        











PT  13.8 Seconds (9.8-13.1)  H  10/12/18  18:39    


 


INR  1.2   10/12/18  18:39    


 


APTT  30.1 Seconds (25.6-37.1)   10/12/18  18:39    














- GI/Abdominal Exam


GI & Abdominal Exam: Soft, Tenderness





Assessment and Plan


(1) Abdominal pain


Assessment & Plan: 


47 yo woman w/ metastatic colon cancer to liver admitted for SBO.  Clinically 

improving.





- plan per surgical team


- patient will need to come off Toradol soon, monitor pain control with Dilaudid

only


- will probably discharge patient on low dose dilaudid PO


Status: Acute

## 2018-10-18 LAB
ALBUMIN SERPL-MCNC: 3.8 G/DL (ref 3.5–5)
ALBUMIN/GLOB SERPL: 1.1 {RATIO} (ref 1–2.1)
ALT SERPL-CCNC: 52 U/L (ref 9–52)
AST SERPL-CCNC: 41 U/L (ref 14–36)
BUN SERPL-MCNC: 9 MG/DL (ref 7–17)
CALCIUM SERPL-MCNC: 9.1 MG/DL (ref 8.4–10.2)
GFR NON-AFRICAN AMERICAN: > 60

## 2018-10-18 PROCEDURE — B548ZZA ULTRASONOGRAPHY OF SUPERIOR VENA CAVA, GUIDANCE: ICD-10-PCS

## 2018-10-18 PROCEDURE — 3E0436Z INTRODUCTION OF NUTRITIONAL SUBSTANCE INTO CENTRAL VEIN, PERCUTANEOUS APPROACH: ICD-10-PCS | Performed by: GENERAL ACUTE CARE HOSPITAL

## 2018-10-18 PROCEDURE — 02HV33Z INSERTION OF INFUSION DEVICE INTO SUPERIOR VENA CAVA, PERCUTANEOUS APPROACH: ICD-10-PCS

## 2018-10-18 PROCEDURE — B518ZZA FLUOROSCOPY OF SUPERIOR VENA CAVA, GUIDANCE: ICD-10-PCS

## 2018-10-18 RX ADMIN — DEXTROSE MONOHYDRATE, SODIUM CHLORIDE, AND POTASSIUM CHLORIDE SCH MLS/HR: 50; 9; 1.49 INJECTION, SOLUTION INTRAVENOUS at 05:47

## 2018-10-18 RX ADMIN — INSULIN LISPRO SCH UNITS: 100 INJECTION, SOLUTION INTRAVENOUS; SUBCUTANEOUS at 08:39

## 2018-10-18 RX ADMIN — DEXTROSE MONOHYDRATE, SODIUM CHLORIDE, AND POTASSIUM CHLORIDE SCH MLS/HR: 50; 9; 1.49 INJECTION, SOLUTION INTRAVENOUS at 15:58

## 2018-10-18 RX ADMIN — DEXTROSE MONOHYDRATE, SODIUM CHLORIDE, AND POTASSIUM CHLORIDE SCH MLS/HR: 50; 9; 1.49 INJECTION, SOLUTION INTRAVENOUS at 00:01

## 2018-10-18 RX ADMIN — DEXTROSE MONOHYDRATE, SODIUM CHLORIDE, AND POTASSIUM CHLORIDE SCH: 50; 9; 1.49 INJECTION, SOLUTION INTRAVENOUS at 03:00

## 2018-10-18 RX ADMIN — DEXTROSE MONOHYDRATE, SODIUM CHLORIDE, AND POTASSIUM CHLORIDE SCH MLS/HR: 50; 9; 1.49 INJECTION, SOLUTION INTRAVENOUS at 23:03

## 2018-10-18 RX ADMIN — DEXTROSE MONOHYDRATE, SODIUM CHLORIDE, AND POTASSIUM CHLORIDE SCH: 50; 9; 1.49 INJECTION, SOLUTION INTRAVENOUS at 17:40

## 2018-10-18 RX ADMIN — INSULIN LISPRO SCH: 100 INJECTION, SOLUTION INTRAVENOUS; SUBCUTANEOUS at 17:46

## 2018-10-18 RX ADMIN — ENOXAPARIN SODIUM SCH: 40 INJECTION SUBCUTANEOUS at 08:42

## 2018-10-18 RX ADMIN — ENOXAPARIN SODIUM SCH MG: 40 INJECTION SUBCUTANEOUS at 18:20

## 2018-10-18 RX ADMIN — INSULIN LISPRO SCH: 100 INJECTION, SOLUTION INTRAVENOUS; SUBCUTANEOUS at 12:07

## 2018-10-18 NOTE — CP.PCM.PN
Subjective





- Date & Time of Evaluation


Date of Evaluation: 10/18/18


Time of Evaluation: 07:51





- Subjective


Subjective: 





Surgery: Dr. Meehan





Pt seen and examined. Was started on CLD yesterday. Vomited last night. Pain is 

controlled. Pt still passing flatus, not as frequent, +BM.





Objective





- Vital Signs/Intake and Output


Vital Signs (last 24 hours): 


                                        











Temp Pulse Resp BP Pulse Ox


 


 97.9 F   102 H  20   130/88   99 


 


 10/18/18 00:15  10/18/18 00:15  10/18/18 00:15  10/18/18 00:15  10/18/18 00:15











- Medications


Medications: 


                               Current Medications





Dextrose (Dextrose 50% Inj)  0 ml IV STAT PRN; Protocol


   PRN Reason: Hypoglycemia Protocol


Dextrose (Glutose 15)  0 gm PO ONCE PRN; Protocol


   PRN Reason: Hypoglycemia Protocol


Enoxaparin Sodium (Lovenox)  40 mg SC DAILY JAM; Protocol


   Last Admin: 10/17/18 10:00 Dose:  40 mg


Famotidine (Pepcid)  20 mg IVP Q12 JAM


   Last Admin: 10/17/18 22:00 Dose:  20 mg


Glucagon (Glucagen Diagnostic Kit)  0 mg IM STAT PRN; Protocol


   PRN Reason: Hypoglycemia Protocol


Hydromorphone HCl (Dilaudid)  1 mg IVP Q3 PRN


   PRN Reason: Pain, severe (8-10)


   Last Admin: 10/18/18 05:45 Dose:  1 mg


Potassium Chloride/Dextrose/Sod Cl (Potassium Chl 20 Meq In D5-Ns)  1,000 mls @ 

150 mls/hr IV .Q6H40M JAM


   Last Admin: 10/18/18 05:47 Dose:  150 mls/hr


Insulin Human Lispro (Humalog)  0 units SC ACCU-CHECK JAM; Protocol


   Last Admin: 10/17/18 22:00 Dose:  Not Given


Ketorolac Tromethamine (Toradol)  30 mg IVP Q6 JAM


   Stop: 10/18/18 16:01


   Last Admin: 10/18/18 04:11 Dose:  30 mg


Lidocaine (Lidoderm)  1 ea TD DAILY JAM


   Last Admin: 10/17/18 09:59 Dose:  1 ea


Ondansetron HCl (Zofran Inj)  2 mg IVP Q6 PRN


   PRN Reason: Nausea/Vomiting


   Last Admin: 10/18/18 03:04 Dose:  2 mg


Potassium Chloride (Potassium Chloride Oral Soln)  20 meq PO DAILY JAM


   Stop: 10/21/18 09:01











- Labs


Labs: 


                                        





                                 10/16/18 05:30 





                                 10/18/18 05:35 





                                        











PT  13.8 Seconds (9.8-13.1)  H  10/12/18  18:39    


 


INR  1.2   10/12/18  18:39    


 


APTT  30.1 Seconds (25.6-37.1)   10/12/18  18:39    














- Constitutional


Appears: Non-toxic, No Acute Distress





- Head Exam


Head Exam: ATRAUMATIC, NORMOCEPHALIC





- Eye Exam


Eye Exam: EOMI





- ENT Exam


ENT Exam: Mucous Membranes Moist





- Respiratory Exam


Respiratory Exam: NORMAL BREATHING PATTERN.  absent: Accessory Muscle Use, 

Respiratory Distress





- GI/Abdominal Exam


GI & Abdominal Exam: Soft.  absent: Distended, Firm, Guarding, Rigid, 

Tenderness, Rebound





- Extremities Exam


Extremities Exam: absent: Calf Tenderness, Pedal Edema





- Neurological Exam


Neurological Exam: Alert, Awake, Oriented x3





Assessment and Plan





- Assessment and Plan (Free Text)


Assessment: 





48F with Hx of metastatic Colon CA s/p L colectomy & oopherectomy in 2016 

presenting with SBO 


-Will make NPO, if no further episodes of vomiting, will resume CLD later today


-c/w IVF


-will get PICC, plan to start TPN tomorrow


-serial abd exams


-encourage ambulation


-will place NGT if symptoms worsen


-d/w attending





Breannitis PGY4

## 2018-10-18 NOTE — RAD
Date of service: 



10/18/2018



HISTORY:

 s/p ngt 



COMPARISON:

10/14/2018. 



FINDINGS:

Right-sided MediPort terminates at the cavoatrial junction. 



LUNGS:

The lungs are well inflated and clear.



PLEURA:

No pleural effusions or pneumothorax. 



CARDIOVASCULAR:

The heart is normal in size.  No aortic atherosclerotic calcification 

present. 



OSSEOUS STRUCTURES:

Within normal limits for the patient's age.



VISUALIZED UPPER ABDOMEN:

Normal.



OTHER FINDINGS:

None.



IMPRESSION:

No active pulmonary disease.

## 2018-10-18 NOTE — CP.PCM.PN
Addendum entered and electronically signed by Ariadna Cortes MD  10/18/18 15:59: 


Patient seen and examined bedside .All chart and clinical data reviewed . Case 

discussed with resident . Agree with assessment and plan.


Feeling weak, not passing flatus , nauseated 


Abdomen is soft and BS present 


Repeat KUB showed suspected mechanical bowel obstruction 


keep NPO 


NGt placed by surgery


Continue IVF, pain managemnet 


PICC line placed . Plan to start TPN in AM


Repeat CBC, BMP in AM 








Original Note:








Subjective





- Date & Time of Evaluation


Date of Evaluation: 10/18/18


Time of Evaluation: 10:01





- Subjective


Subjective: 





Pt states that she had 2x episode of emesis overnight. Mild nausea this AM, but 

tolerated juice this AM. Passing gas, last BM was yesterday. Pt continues to 

have abdominal pain, better with pain meds. 





Objective





- Vital Signs/Intake and Output


Vital Signs (last 24 hours): 


                                        











Temp Pulse Resp BP Pulse Ox


 


 98.0 F   81   20   118/81   100 


 


 10/18/18 08:06  10/18/18 08:06  10/18/18 08:06  10/18/18 08:06  10/18/18 08:06











- Medications


Medications: 


                               Current Medications





Dextrose (Dextrose 50% Inj)  0 ml IV STAT PRN; Protocol


   PRN Reason: Hypoglycemia Protocol


Dextrose (Glutose 15)  0 gm PO ONCE PRN; Protocol


   PRN Reason: Hypoglycemia Protocol


Enoxaparin Sodium (Lovenox)  40 mg SC DAILY JAM; Protocol


   Last Admin: 10/18/18 08:42 Dose:  Not Given


Famotidine (Pepcid)  20 mg IVP Q12 [facility]


   Last Admin: 10/18/18 08:42 Dose:  20 mg


Glucagon (Glucagen Diagnostic Kit)  0 mg IM STAT PRN; Protocol


   PRN Reason: Hypoglycemia Protocol


Hydromorphone HCl (Dilaudid)  1 mg IVP Q3 PRN


   PRN Reason: Pain, severe (8-10)


   Last Admin: 10/18/18 08:36 Dose:  1 mg


Potassium Chloride/Dextrose/Sod Cl (Potassium Chl 20 Meq In D5-Ns)  1,000 mls @ 

150 mls/hr IV .Q6H40M [facility]


   Last Admin: 10/18/18 05:47 Dose:  150 mls/hr


Insulin Human Lispro (Humalog)  0 units SC ACCU-CHECK JAM; Protocol


   Last Admin: 10/18/18 08:39 Dose:  2 units


Ketorolac Tromethamine (Toradol)  30 mg IVP Q6 JAM


   Stop: 10/18/18 16:01


   Last Admin: 10/18/18 04:11 Dose:  30 mg


Lidocaine (Lidoderm)  1 ea TD DAILY JAM


   Last Admin: 10/18/18 08:41 Dose:  1 ea


Ondansetron HCl (Zofran Inj)  2 mg IVP Q6 PRN


   PRN Reason: Nausea/Vomiting


   Last Admin: 10/18/18 08:43 Dose:  2 mg


Potassium Chloride (Potassium Chloride Oral Soln)  20 meq PO DAILY JAM


   Stop: 10/21/18 09:01


   Last Admin: 10/18/18 08:41 Dose:  Not Given











- Labs


Labs: 


                                        





                                 10/16/18 05:30 





                                 10/18/18 05:35 





                                        











PT  13.8 Seconds (9.8-13.1)  H  10/12/18  18:39    


 


INR  1.2   10/12/18  18:39    


 


APTT  30.1 Seconds (25.6-37.1)   10/12/18  18:39    














- Constitutional


Appears: No Acute Distress





- Eye Exam


Eye Exam: EOMI





- ENT Exam


ENT Exam: Mucous Membranes Moist





- Respiratory Exam


Respiratory Exam: Clear to Ausculation Bilateral, NORMAL BREATHING PATTERN.  

absent: Wheezes





- Cardiovascular Exam


Cardiovascular Exam: REGULAR RHYTHM, +S1, +S2





- GI/Abdominal Exam


GI & Abdominal Exam: Soft, Tenderness (mild, LLQ and RLQ).  absent: Distended, 

Guarding, Rigid





- Extremities Exam


Extremities Exam: Normal Inspection.  absent: Pedal Edema





- Neurological Exam


Neurological Exam: Alert, Awake





Assessment and Plan





- Assessment and Plan (Free Text)


Assessment: 





Assessment/Plan:


47 YO Female with PMHx of HTN, chronic back/abdominal pain with multiple nerves 

blocks (pain management as out patient), Colon cancer w/ metastasis to liver (on

chemotherapy), Depression, Fibromyalgia, GERD admitted for SBO.





Small Bowel Obstruction possibly due to internal hernia


- s/p NGT removed 10/17/18


- Surgery consult, Dr. Velarde/Dr. Meehan, recommendations appreciated, PICC 

line for TPN 


- liquid diet switched to NPO this AM 


- C/w IVF, Pain control: Morphine and Hydromorphone, Zofran and famotidine 


- follow up abd XR





Hypokalemia


- resolved


- fluids decreased to KCL 20meq d5, ns @ 1500ml/hr





Chronic back pain 


- 2/2 metastatic colon Ca


- Chronic, H/O multiple lumbar surgeries. 


- Lidoderm patch for back pain


- Pain management consult, Dr. Winn, recommendations appreciated 


- C/w pain management: Hydromorphine 1.5mg Q3PRN





NIDDM


- Controlled 


- Last A1c 7.7 on 5/28/18


- Low dose sliding scale


- Hold metformin 


- Hypoglycemia protocol 





HTN


- Controlled


- Hold Amlodipine 10 mg QD


- Monitor vitals





Fibromyalgia


- Controlled


- Hold Gabapentin 300 mg TID


- Hold Duloxetine 60 mg PO HS





DVT


-lovenox sc

## 2018-10-18 NOTE — PCM.[email or web address]
Surgeon's Initial Post Op Note





- Surgeon's Notes


Surgeon: Gurpreet Garcia MD


Assistant: NONE


Type of Anesthesia: Local


Pre-Operative Diagnosis: Poor venous access


Operative Findings: US showed patent left basilic vein


Post-Operative Diagnosis: Poor venous access


Operation Performed: Single lumen picc placement left arm, 43 CM.


Specimen/Specimens Removed: none


Estimated Blood Loss: EBL {In ML}: 2


Blood Products Given: N/A


Drains Used: No Drains


Post-Op Condition: Fair


Date of Surgery/Procedure: 10/18/18


Time of Surgery/Procedure: 12:35

## 2018-10-18 NOTE — VASCULAR
PROCEDURE:  Date of procedure: 10/18/2018



Procedure: 



1. Placement of a left arm PICC with ultrasound and fluoroscopic 

guidance, CPT 78280



2. PICC tip confirmation with spot radiograph and is in the superior 

vena cava 



Medications: 3CC 1 percent lidocaine 







Total Fluoro time: 3.4 seconds



Radiation: 0.42  MGy



EBL: 3 cc



HISTORY:

Poor venous access



TECHNIQUE:

 Following informed consent and procedure time-out, the patient 

placed supine on the interventional table and the left arm prepped 

and draped in the usual sterile fashion.  Ultrasound showed a patent 

and compressible left basilic vein. After the skin was anesthetized 

with lidocaine, the basilic vein was accessed with micro 

micropuncture technique using ultrasound guidance. A guidewire was 

then advanced under fluoroscopic guidance into the superior vena 

cava. An image documenting ultrasound guidance for vascular access 

was permanently saved.



The length of a single-lumen 4 Yi PICC was trimmed to 43 cm and 

advanced through a peel-away sheath.  The PICC was position with tip 

of PICC confirm a spot radiograph the superior vena cava. The PICC 

was secured to the patient's skin.  The PICC was flushed.  A biopatch 

and sterile dressing was applied. 



IMPRESSION:





Placement of a single-lumen 4 Yi PICC left basilic vein trimmed 

to 43 cm. The tip of the PICC is confirmed with spot radiograph and 

is in the superior vena cava.

## 2018-10-18 NOTE — RAD
Date of service: 



10/18/2018



HISTORY:

 SBO 



COMPARISON:

No prior.



FINDINGS:



BOWEL:

Multiple loops of dilated small bowel suspicious for mechanical small 

bowel obstruction.  There is gas and fecal matter seen in the 

transverse colon.  No masses or abnormal calcifications are 

identified.  No hepatic or splenic enlargement.  



BONES:

Artificial disc spacer noted at L4-5.



OTHER FINDINGS:

None.



IMPRESSION:

Findings suspicious for mechanical small bowel obstruction as 

demonstrated on prior CT of 10/12/2018.

## 2018-10-19 LAB
ANISOCYTOSIS BLD QL SMEAR: SLIGHT
BASOPHILS # BLD AUTO: 0 K/UL (ref 0–0.2)
BASOPHILS NFR BLD: 0.5 % (ref 0–2)
BUN SERPL-MCNC: 4 MG/DL (ref 7–17)
CALCIUM SERPL-MCNC: 9.4 MG/DL (ref 8.4–10.2)
EOSINOPHIL # BLD AUTO: 0.1 K/UL (ref 0–0.7)
EOSINOPHIL NFR BLD: 3 % (ref 0–4)
ERYTHROCYTE [DISTWIDTH] IN BLOOD BY AUTOMATED COUNT: 15.4 % (ref 11.5–14.5)
GFR NON-AFRICAN AMERICAN: > 60
HGB BLD-MCNC: 14.2 G/DL (ref 12–16)
LG PLATELETS BLD QL SMEAR: PRESENT
LYMPHOCYTE: 28 % (ref 20–50)
LYMPHOCYTES # BLD AUTO: 1 K/UL (ref 1–4.3)
LYMPHOCYTES NFR BLD AUTO: 26.7 % (ref 20–40)
MCH RBC QN AUTO: 27.6 PG (ref 27–31)
MCHC RBC AUTO-ENTMCNC: 32.8 G/DL (ref 33–37)
MCV RBC AUTO: 84.2 FL (ref 81–99)
MICROCYTES BLD QL SMEAR: SLIGHT
MONOCYTE: 15 % (ref 0–10)
MONOCYTES # BLD: 0.8 K/UL (ref 0–0.8)
MONOCYTES NFR BLD: 21.4 % (ref 0–10)
NEUTROPHILS # BLD: 1.9 K/UL (ref 1.8–7)
NEUTROPHILS NFR BLD AUTO: 48.4 % (ref 50–75)
NEUTROPHILS NFR BLD AUTO: 57 % (ref 42–75)
NRBC BLD AUTO-RTO: 0.1 % (ref 0–0)
OVALOCYTES BLD QL SMEAR: (no result)
PLATELET # BLD EST: NORMAL 10*3/UL
PLATELET # BLD: 230 K/UL (ref 130–400)
PMV BLD AUTO: 9.6 FL (ref 7.2–11.7)
RBC # BLD AUTO: 5.14 MIL/UL (ref 3.8–5.2)
TOTAL CELLS COUNTED BLD: 100
WBC # BLD AUTO: 3.9 K/UL (ref 4.8–10.8)

## 2018-10-19 RX ADMIN — INSULIN LISPRO SCH: 100 INJECTION, SOLUTION INTRAVENOUS; SUBCUTANEOUS at 11:50

## 2018-10-19 RX ADMIN — PURIFIED WATER PRN LOZ: 99.05 LIQUID OPHTHALMIC at 07:11

## 2018-10-19 RX ADMIN — INSULIN LISPRO SCH UNITS: 100 INJECTION, SOLUTION INTRAVENOUS; SUBCUTANEOUS at 16:29

## 2018-10-19 RX ADMIN — INSULIN LISPRO SCH: 100 INJECTION, SOLUTION INTRAVENOUS; SUBCUTANEOUS at 00:04

## 2018-10-19 RX ADMIN — NYSTATIN SCH APPLIC: 100000 OINTMENT TOPICAL at 16:29

## 2018-10-19 RX ADMIN — ENOXAPARIN SODIUM SCH MG: 40 INJECTION SUBCUTANEOUS at 08:55

## 2018-10-19 RX ADMIN — NYSTATIN SCH APPLIC: 100000 OINTMENT TOPICAL at 12:01

## 2018-10-19 RX ADMIN — DEXTROSE MONOHYDRATE, SODIUM CHLORIDE, AND POTASSIUM CHLORIDE SCH MLS/HR: 50; 9; 1.49 INJECTION, SOLUTION INTRAVENOUS at 10:38

## 2018-10-19 RX ADMIN — NYSTATIN SCH APPLIC: 100000 OINTMENT TOPICAL at 09:45

## 2018-10-19 RX ADMIN — INSULIN LISPRO SCH: 100 INJECTION, SOLUTION INTRAVENOUS; SUBCUTANEOUS at 23:13

## 2018-10-19 RX ADMIN — DEXTROSE MONOHYDRATE, SODIUM CHLORIDE, AND POTASSIUM CHLORIDE SCH MLS/HR: 50; 9; 1.49 INJECTION, SOLUTION INTRAVENOUS at 17:48

## 2018-10-19 NOTE — CP.PCM.PN
Subjective





- Date & Time of Evaluation


Date of Evaluation: 10/19/18


Time of Evaluation: 08:15





- Subjective


Subjective: 


Patient complains of severe pain, not entirely treated by Dilaudid 1mg q3h PRN. 

Didn't tolerate CLD, had a couple of episodes of vomiting.  TPN started 

yesterday.





It was explained to her that opioid needs to be limited for GI motility.  





Objective





- Vital Signs/Intake and Output


Vital Signs (last 24 hours): 


                                        











Temp Pulse Resp BP Pulse Ox


 


 98.0 F   95 H  20   107/73   100 


 


 10/19/18 08:02  10/19/18 08:02  10/19/18 08:02  10/19/18 08:02  10/19/18 08:02








Intake and Output: 


                                        











 10/19/18 10/19/18





 06:59 18:59


 


Intake Total 684 


 


Output Total 1000 


 


Balance -316 














- Medications


Medications: 


                               Current Medications





Benzocaine/Menthol (Cepacol Sore Throat)  1 judith PO Q2 PRN


   PRN Reason: Sore Throat


   Last Admin: 10/19/18 07:11 Dose:  1 judith


Dextrose (Dextrose 50% Inj)  0 ml IV STAT PRN; Protocol


   PRN Reason: Hypoglycemia Protocol


Dextrose (Glutose 15)  0 gm PO ONCE PRN; Protocol


   PRN Reason: Hypoglycemia Protocol


Diphenhydramine HCl (Benadryl)  50 mg IVP Q8 PRN


   PRN Reason: Agitation


Enoxaparin Sodium (Lovenox)  40 mg SC DAILY JAM; Protocol


   Last Admin: 10/18/18 18:20 Dose:  40 mg


Famotidine (Pepcid)  20 mg IVP Q12 [facility]


   Last Admin: 10/18/18 21:56 Dose:  20 mg


Fat Emulsion Intravenous (Intralipid 20%)  250 ml IV MWF ONE


   Stop: 10/19/18 09:01


Glucagon (Glucagen Diagnostic Kit)  0 mg IM STAT PRN; Protocol


   PRN Reason: Hypoglycemia Protocol


Hydromorphone HCl (Dilaudid)  1 mg IVP Q3 PRN


   PRN Reason: Pain, severe (8-10)


   Last Admin: 10/19/18 05:49 Dose:  1 mg


Potassium Chloride/Dextrose/Sod Cl (Potassium Chl 20 Meq In D5-Ns)  1,000 mls @ 

150 mls/hr IV .Q6H40M [facility]


   Last Admin: 10/18/18 23:03 Dose:  150 mls/hr


Multivitamins/Vitamin C 10 ml/Chromium/Copper/Manganese/Zinc 3 ml/ Amino 

Acids/Electrolytes/Dextrose  1,013 mls @ 42 mls/hr IV .Q24H [facility]


   Stop: 10/19/18 14:14


   Last Admin: 10/18/18 15:57 Dose:  42 mls/hr


Insulin Human Lispro (Humalog)  0 units SC ACCU-CHECK JAM; Protocol


   Last Admin: 10/19/18 00:04 Dose:  Not Given


Lidocaine (Lidoderm)  1 ea TD DAILY J[facility]


   Last Admin: 10/18/18 08:41 Dose:  1 ea


Ondansetron HCl (Zofran Inj)  2 mg IVP Q6 PRN


   PRN Reason: Nausea/Vomiting


   Last Admin: 10/19/18 03:49 Dose:  2 mg











- Labs


Labs: 


                                        





                                 10/19/18 05:55 





                                 10/19/18 05:55 





                                        











PT  13.8 Seconds (9.8-13.1)  H  10/12/18  18:39    


 


INR  1.2   10/12/18  18:39    


 


APTT  30.1 Seconds (25.6-37.1)   10/12/18  18:39    














- GI/Abdominal Exam


GI & Abdominal Exam: Soft, Tenderness





Assessment and Plan


(1) Abdominal pain


Assessment & Plan: 


47 yo woman w/ SBO, chronic pain.





- continue Dilaudid 1mg IV PRN


- add Ativan for anxiety, for synergistic effect with opioid


- add Benadryl for sleep


- f/u surgery rec's


Status: Acute

## 2018-10-19 NOTE — CP.PCM.PN
Subjective





- Date & Time of Evaluation


Date of Evaluation: 10/19/18


Time of Evaluation: 08:15





- Subjective


Subjective: 





PICC line yesterday. NGT was was placed, taken out this AM by pt due to 

discomfort. 


This AM pt states that she feels better then yesterday but continues to have 

abdominal pain. 


Endorsing rash she has had below the L breast. 


Denies chest pain, dyspnea, palpatations. 





Objective





- Vital Signs/Intake and Output


Vital Signs (last 24 hours): 


                                        











Temp Pulse Resp BP Pulse Ox


 


 98.0 F   95 H  20   107/73   100 


 


 10/19/18 08:02  10/19/18 08:02  10/19/18 08:02  10/19/18 08:02  10/19/18 08:02








Intake and Output: 


                                        











 10/19/18 10/19/18





 06:59 18:59


 


Intake Total 684 


 


Output Total 1000 


 


Balance -316 














- Medications


Medications: 


                               Current Medications





Benzocaine/Menthol (Cepacol Sore Throat)  1 judith PO Q2 PRN


   PRN Reason: Sore Throat


   Last Admin: 10/19/18 07:11 Dose:  1 judith


Dextrose (Dextrose 50% Inj)  0 ml IV STAT PRN; Protocol


   PRN Reason: Hypoglycemia Protocol


Dextrose (Glutose 15)  0 gm PO ONCE PRN; Protocol


   PRN Reason: Hypoglycemia Protocol


Diphenhydramine HCl (Benadryl)  50 mg IVP Q8 PRN


   PRN Reason: Agitation


Enoxaparin Sodium (Lovenox)  40 mg SC DAILY JAM; Protocol


   Last Admin: 10/19/18 08:55 Dose:  40 mg


Famotidine (Pepcid)  20 mg IVP Q12 [facility]


   Last Admin: 10/18/18 21:56 Dose:  20 mg


Fat Emulsion Intravenous (Intralipid 20%)  250 ml IV MWF ONE


   Stop: 10/19/18 09:01


Glucagon (Glucagen Diagnostic Kit)  0 mg IM STAT PRN; Protocol


   PRN Reason: Hypoglycemia Protocol


Hydromorphone HCl (Dilaudid)  1 mg IVP Q3 PRN


   PRN Reason: Pain, severe (8-10)


   Last Admin: 10/19/18 05:49 Dose:  1 mg


Potassium Chloride/Dextrose/Sod Cl (Potassium Chl 20 Meq In D5-Ns)  1,000 mls @ 

150 mls/hr IV .Q6H40M [facility]


   Last Admin: 10/18/18 23:03 Dose:  150 mls/hr


Multivitamins/Vitamin C 10 ml/Chromium/Copper/Manganese/Zinc 3 ml/ Amino 

Acids/Electrolytes/Dextrose  1,013 mls @ 42 mls/hr IV .Q24H [facility]


   Stop: 10/19/18 14:14


   Last Admin: 10/18/18 15:57 Dose:  42 mls/hr


Insulin Human Lispro (Humalog)  0 units SC ACCU-CHECK JAM; Protocol


   Last Admin: 10/19/18 00:04 Dose:  Not Given


Lidocaine (Lidoderm)  1 ea TD DAILY [facility]


   Last Admin: 10/18/18 08:41 Dose:  1 ea


Lorazepam (Ativan)  0.5 mg IVP Q6 PRN


   PRN Reason: Agitation


Ondansetron HCl (Zofran Inj)  2 mg IVP Q6 PRN


   PRN Reason: Nausea/Vomiting


   Last Admin: 10/19/18 03:49 Dose:  2 mg











- Labs


Labs: 


                                        





                                 10/19/18 05:55 





                                 10/19/18 05:55 





                                        











PT  13.8 Seconds (9.8-13.1)  H  10/12/18  18:39    


 


INR  1.2   10/12/18  18:39    


 


APTT  30.1 Seconds (25.6-37.1)   10/12/18  18:39    














- Constitutional


Appears: No Acute Distress





- Head Exam


Head Exam: NORMAL INSPECTION





- Eye Exam


Eye Exam: Normal appearance





- ENT Exam


ENT Exam: Mucous Membranes Moist





- Respiratory Exam


Respiratory Exam: Clear to Ausculation Bilateral, NORMAL BREATHING PATTERN.  

absent: Wheezes





- Cardiovascular Exam


Cardiovascular Exam: REGULAR RHYTHM, +S1, +S2





- GI/Abdominal Exam


GI & Abdominal Exam: Soft, Hyperactive Bowel Sounds.  absent: Tenderness


Additional comments: 





old incision scars noted, well healed 





- Extremities Exam


Extremities Exam: Normal Inspection





- Neurological Exam


Neurological Exam: Alert, Awake





- Skin


Additional comments: 





rash noted under L breast fold, skin intact, mild erythema, white well 

demarcated, no discharge  





Assessment and Plan





- Assessment and Plan (Free Text)


Assessment: 





Assessment/Plan:


49 YO Female with PMHx of HTN, chronic back/abdominal pain with multiple nerves 

blocks (pain management as out patient), Colon cancer w/ metastasis to liver (on

chemotherapy), Depression, Fibromyalgia, GERD admitted for SBO.





Small Bowel Obstruction possibly due to internal hernia


- s/p NGT removed 10/17/18


- Surgery consult, Dr. Velarde/Dr. Meehan, recommendations appreciated, PICC 

line for TPN 


- cont NPO, TPN


- C/w IVF, Pain control: Morphine and Hydromorphone, Zofran and famotidine 


- fluids decreased to KCL 20meq d5, ns @ 1500ml/hr





Chronic back pain 


- 2/2 metastatic colon Ca


- Chronic, H/O multiple lumbar surgeries. 


- Lidoderm patch for back pain


- Pain management consult, Dr. Winn, recommendations appreciated; Ativan for 

anxiety, for synergistic effect with opioid and Benadryl for sleep 


- C/w pain management: Hydromorphine 1.5mg Q3PRN





NIDDM


- Controlled 


- Last A1c 7.7 on 5/28/18


- Low dose sliding scale


- Hold metformin 


- Hypoglycemia protocol 





HTN


- Controlled


- Hold Amlodipine 10 mg QD


- Monitor vitals





Skin Rash


-likely candida skin infection 


-nystatin TOP TID





Fibromyalgia


- Controlled


- Hold Gabapentin 300 mg TID


- Hold Duloxetine 60 mg PO HS





DVT


-lovenox sc

## 2018-10-19 NOTE — CP.PCM.PN
Subjective





- Date & Time of Evaluation


Date of Evaluation: 10/19/18


Time of Evaluation: 09:53





- Subjective


Subjective: 





Surgery: Dr. Meehan





Pt seen and examined. Had nausea and vomiting yesterday afternoon. NGT was 

placed. 1700cc output overnight. Pt removed NGT on her own this AM. She states 

she no longer feels nauseous. Pain controlled. Continues to pass flatus. 





Objective





- Vital Signs/Intake and Output


Vital Signs (last 24 hours): 


                                        











Temp Pulse Resp BP Pulse Ox


 


 98.0 F   95 H  20   107/73   100 


 


 10/19/18 08:02  10/19/18 08:02  10/19/18 08:02  10/19/18 08:02  10/19/18 08:02








Intake and Output: 


                                        











 10/19/18 10/19/18





 06:59 18:59


 


Intake Total 684 


 


Output Total 1000 


 


Balance -316 














- Medications


Medications: 


                               Current Medications





Benzocaine/Menthol (Cepacol Sore Throat)  1 judith PO Q2 PRN


   PRN Reason: Sore Throat


   Last Admin: 10/19/18 07:11 Dose:  1 judith


Dextrose (Dextrose 50% Inj)  0 ml IV STAT PRN; Protocol


   PRN Reason: Hypoglycemia Protocol


Dextrose (Glutose 15)  0 gm PO ONCE PRN; Protocol


   PRN Reason: Hypoglycemia Protocol


Diphenhydramine HCl (Benadryl)  50 mg IVP Q8 PRN


   PRN Reason: Agitation


Enoxaparin Sodium (Lovenox)  40 mg SC DAILY [facility]; Protocol


   Last Admin: 10/19/18 08:55 Dose:  40 mg


Famotidine (Pepcid)  20 mg IVP Q12 [facility]


   Last Admin: 10/19/18 09:02 Dose:  20 mg


Glucagon (Glucagen Diagnostic Kit)  0 mg IM STAT PRN; Protocol


   PRN Reason: Hypoglycemia Protocol


Hydromorphone HCl (Dilaudid)  1 mg IVP Q3 PRN


   PRN Reason: Pain, severe (8-10)


   Last Admin: 10/19/18 09:02 Dose:  1 mg


Potassium Chloride/Dextrose/Sod Cl (Potassium Chl 20 Meq In D5-Ns)  1,000 mls @ 

150 mls/hr IV .Q6H40M [facility]


   Last Admin: 10/18/18 23:03 Dose:  150 mls/hr


Multivitamins/Vitamin C 10 ml/Chromium/Copper/Manganese/Zinc 3 ml/ Amino 

Acids/Electrolytes/Dextrose  1,013 mls @ 42 mls/hr IV .Q24H ONE


   Stop: 10/19/18 14:14


   Last Admin: 10/18/18 15:57 Dose:  42 mls/hr


Insulin Human Lispro (Humalog)  0 units SC ACCU-CHECK [facility]; Protocol


   Last Admin: 10/19/18 00:04 Dose:  Not Given


Lidocaine (Lidoderm)  1 ea TD DAILY [facility]


   Last Admin: 10/18/18 08:41 Dose:  1 ea


Lorazepam (Ativan)  0.5 mg IVP Q6 PRN


   PRN Reason: Agitation


   Last Admin: 10/19/18 09:42 Dose:  0.5 mg


Nystatin (Mycostatin Oint)  1 applic TOP TID [facility]


   Last Admin: 10/19/18 09:45 Dose:  1 applic


Ondansetron HCl (Zofran Inj)  2 mg IVP Q6 PRN


   PRN Reason: Nausea/Vomiting


   Last Admin: 10/19/18 03:49 Dose:  2 mg











- Labs


Labs: 


                                        





                                 10/19/18 05:55 





                                 10/19/18 05:55 





                                        











PT  13.8 Seconds (9.8-13.1)  H  10/12/18  18:39    


 


INR  1.2   10/12/18  18:39    


 


APTT  30.1 Seconds (25.6-37.1)   10/12/18  18:39    














- Constitutional


Appears: Non-toxic, No Acute Distress





- Head Exam


Head Exam: ATRAUMATIC, NORMOCEPHALIC





- Eye Exam


Eye Exam: EOMI





- ENT Exam


ENT Exam: Mucous Membranes Moist





- Neck Exam


Neck Exam: Full ROM





- Respiratory Exam


Respiratory Exam: NORMAL BREATHING PATTERN.  absent: Accessory Muscle Use, Re

spiratory Distress





- GI/Abdominal Exam


GI & Abdominal Exam: Soft.  absent: Distended, Firm, Guarding, Rigid, 

Tenderness, Rebound





- Extremities Exam


Extremities Exam: absent: Calf Tenderness, Pedal Edema





- Neurological Exam


Neurological Exam: Alert, Awake, Oriented x3





Assessment and Plan





- Assessment and Plan (Free Text)


Assessment: 





48F with Hx of metastatic Colon CA s/p L colectomy & oopherectomy in 2016 

presenting with SBO 


-Keep NPO


-c/w TPN


-serial abd exams


-encourage ambulation


-will place NGT if symptoms worsen


-limit narcotics


-d/w attending





Zemaitis PGY4

## 2018-10-20 LAB
BUN SERPL-MCNC: 7 MG/DL (ref 7–17)
CALCIUM SERPL-MCNC: 9.6 MG/DL (ref 8.4–10.2)
ERYTHROCYTE [DISTWIDTH] IN BLOOD BY AUTOMATED COUNT: 15.4 % (ref 11.5–14.5)
GFR NON-AFRICAN AMERICAN: > 60
HGB BLD-MCNC: 14.9 G/DL (ref 12–16)
MCH RBC QN AUTO: 27.3 PG (ref 27–31)
MCHC RBC AUTO-ENTMCNC: 32.7 G/DL (ref 33–37)
MCV RBC AUTO: 83.4 FL (ref 81–99)
PLATELET # BLD: 244 K/UL (ref 130–400)
RBC # BLD AUTO: 5.45 MIL/UL (ref 3.8–5.2)
WBC # BLD AUTO: 5.9 K/UL (ref 4.8–10.8)

## 2018-10-20 RX ADMIN — DEXTROSE MONOHYDRATE, SODIUM CHLORIDE, AND POTASSIUM CHLORIDE SCH: 50; 9; 1.49 INJECTION, SOLUTION INTRAVENOUS at 17:36

## 2018-10-20 RX ADMIN — INSULIN LISPRO SCH: 100 INJECTION, SOLUTION INTRAVENOUS; SUBCUTANEOUS at 17:30

## 2018-10-20 RX ADMIN — ENOXAPARIN SODIUM SCH MG: 40 INJECTION SUBCUTANEOUS at 08:18

## 2018-10-20 RX ADMIN — DEXTROSE MONOHYDRATE, SODIUM CHLORIDE, AND POTASSIUM CHLORIDE SCH MLS/HR: 50; 9; 1.49 INJECTION, SOLUTION INTRAVENOUS at 00:54

## 2018-10-20 RX ADMIN — INSULIN LISPRO SCH: 100 INJECTION, SOLUTION INTRAVENOUS; SUBCUTANEOUS at 22:29

## 2018-10-20 RX ADMIN — DEXTROSE MONOHYDRATE, SODIUM CHLORIDE, AND POTASSIUM CHLORIDE SCH MLS/HR: 50; 9; 1.49 INJECTION, SOLUTION INTRAVENOUS at 22:30

## 2018-10-20 RX ADMIN — NYSTATIN SCH APPLIC: 100000 OINTMENT TOPICAL at 12:20

## 2018-10-20 RX ADMIN — INSULIN LISPRO SCH UNITS: 100 INJECTION, SOLUTION INTRAVENOUS; SUBCUTANEOUS at 06:10

## 2018-10-20 RX ADMIN — PURIFIED WATER PRN LOZ: 99.05 LIQUID OPHTHALMIC at 17:09

## 2018-10-20 RX ADMIN — NYSTATIN SCH APPLIC: 100000 OINTMENT TOPICAL at 08:19

## 2018-10-20 RX ADMIN — INSULIN LISPRO SCH UNITS: 100 INJECTION, SOLUTION INTRAVENOUS; SUBCUTANEOUS at 11:52

## 2018-10-20 RX ADMIN — NYSTATIN SCH APPLIC: 100000 OINTMENT TOPICAL at 17:02

## 2018-10-20 NOTE — CP.PCM.PN
<Tamara Klein - Last Filed: 10/20/18 09:51>





Subjective





- Date & Time of Evaluation


Date of Evaluation: 10/20/18


Time of Evaluation: 09:51





- Subjective


Subjective: 





Pt was agitated with benadryl yesterday, it was discontinued. Multiple episodes 

of emesis overnight. Pt receiving her pain meds now. Continues to have abdominal

pain, but better with pain meds. +gas 





Objective





- Vital Signs/Intake and Output


Vital Signs (last 24 hours): 


                                        











Temp Pulse Resp BP Pulse Ox


 


 97.9 F   108 H  20   127/93 H  100 


 


 10/20/18 08:08  10/20/18 08:08  10/20/18 08:08  10/20/18 08:08  10/20/18 08:08








Intake and Output: 


                                        











 10/20/18 10/20/18





 06:59 18:59


 


Intake Total 2304 


 


Output Total 700 


 


Balance 1604 














- Medications


Medications: 


                               Current Medications





Benzocaine/Menthol (Cepacol Sore Throat)  1 judith PO Q2 PRN


   PRN Reason: Sore Throat


   Last Admin: 10/19/18 07:11 Dose:  1 judith


Dextrose (Dextrose 50% Inj)  0 ml IV STAT PRN; Protocol


   PRN Reason: Hypoglycemia Protocol


Dextrose (Glutose 15)  0 gm PO ONCE PRN; Protocol


   PRN Reason: Hypoglycemia Protocol


Enoxaparin Sodium (Lovenox)  40 mg SC DAILY [facility]; Protocol


   Last Admin: 10/20/18 08:18 Dose:  40 mg


Famotidine (Pepcid)  20 mg IVP Q12 [facility]


   Last Admin: 10/20/18 08:22 Dose:  20 mg


Glucagon (Glucagen Diagnostic Kit)  0 mg IM STAT PRN; Protocol


   PRN Reason: Hypoglycemia Protocol


Hydromorphone HCl (Dilaudid)  1 mg IVP Q3 PRN


   PRN Reason: Pain, severe (8-10)


   Last Admin: 10/20/18 08:15 Dose:  1 mg


Potassium Chloride/Dextrose/Sod Cl (Potassium Chl 20 Meq In D5-Ns)  1,000 mls @ 

150 mls/hr IV .Q6H40M [facility]


   Last Admin: 10/20/18 00:54 Dose:  150 mls/hr


Multivitamins/Vitamin C 10 ml/Chromium/Copper/Manganese/Zinc 3 ml/ Amino 

Acids/Electrolytes/Dextrose  1,013 mls @ 42 mls/hr IV .Q24H ONE


   Stop: 10/20/18 13:44


   Last Admin: 10/19/18 15:10 Dose:  42 mls/hr


Insulin Human Lispro (Humalog)  0 units SC ACCU-CHECK [facility]; Protocol


   Last Admin: 10/20/18 06:10 Dose:  2 units


Lidocaine (Lidoderm)  1 ea TD DAILY [facility]


   Last Admin: 10/20/18 08:17 Dose:  1 ea


Lorazepam (Ativan)  0.5 mg IVP Q6 PRN


   PRN Reason: Agitation


   Last Admin: 10/19/18 18:01 Dose:  0.5 mg


Nystatin (Mycostatin Oint)  1 applic TOP TID [facility]


   Last Admin: 10/20/18 08:19 Dose:  1 applic


Ondansetron HCl (Zofran Inj)  2 mg IVP Q6 PRN


   PRN Reason: Nausea/Vomiting


   Last Admin: 10/20/18 08:47 Dose:  2 mg











- Labs


Labs: 


                                        





                                 10/20/18 05:25 





                                 10/20/18 05:25 





                                        











PT  13.8 Seconds (9.8-13.1)  H  10/12/18  18:39    


 


INR  1.2   10/12/18  18:39    


 


APTT  30.1 Seconds (25.6-37.1)   10/12/18  18:39    














- Constitutional


Appears: No Acute Distress





- Head Exam


Head Exam: NORMAL INSPECTION





- Eye Exam


Eye Exam: Normal appearance





- Respiratory Exam


Respiratory Exam: Clear to Ausculation Bilateral, NORMAL BREATHING PATTERN.  

absent: Wheezes





- Cardiovascular Exam


Cardiovascular Exam: REGULAR RHYTHM, +S1, +S2





- GI/Abdominal Exam


GI & Abdominal Exam: Soft, Tenderness (in the LLQ and RLQ).  absent: Distended, 

Guarding





- Extremities Exam


Extremities Exam: Normal Inspection





- Neurological Exam


Neurological Exam: Alert, Awake





Assessment and Plan





- Assessment and Plan (Free Text)


Assessment: 





Assessment/Plan:


49 YO Female with PMHx of HTN, chronic back/abdominal pain with multiple nerves 

blocks (pain management as out patient), Colon cancer w/ metastasis to liver (on

chemotherapy), Depression, Fibromyalgia, GERD admitted for SBO.





Small Bowel Obstruction possibly due to internal hernia


- Surgery consult, Dr. Velarde/Dr. Meehan, recommendations appreciated, jonnie granados per Surgery 


- cont NPO, TPN, NGT, cont IVF


- C/w IVF, Pain control: Hydromorphone, Zofran and famotidine 





Chronic back pain 


- 2/2 metastatic colon Ca


- Chronic, H/O multiple lumbar surgeries. 


- Lidoderm patch for back pain


- Pain management consult, Dr. Winn, recommendations appreciated; Ativan for 

anxiety, for synergistic effect with opioid


- C/w pain management: Hydromorphine 1.5mg





NIDDM


- Controlled 


- Last A1c 7.7 on 5/28/18


- Low dose sliding scale


- Hold metformin 


- Hypoglycemia protocol 





HTN


- Controlled


- Hold Amlodipine 10 mg QD


- Monitor vitals





Skin Rash


-likely candida skin infection 


-nystatin TOP TID





Fibromyalgia


- Controlled


- Hold Gabapentin 300 mg TID


- Hold Duloxetine 60 mg PO HS





DVT


-lovenox sc





<Heike Bruno - Last Filed: 10/20/18 11:17>





Objective





- Vital Signs/Intake and Output


Vital Signs (last 24 hours): 


                                        











Temp Pulse Resp BP Pulse Ox


 


 97.9 F   108 H  20   127/93 H  100 


 


 10/20/18 08:08  10/20/18 08:08  10/20/18 08:08  10/20/18 08:08  10/20/18 08:08








Intake and Output: 


                                        











 10/20/18 10/20/18





 06:59 18:59


 


Intake Total 2304 


 


Output Total 700 


 


Balance 1604 














- Medications


Medications: 


                               Current Medications





Benzocaine/Menthol (Cepacol Sore Throat)  1 judith PO Q2 PRN


   PRN Reason: Sore Throat


   Last Admin: 10/19/18 07:11 Dose:  1 judith


Dextrose (Dextrose 50% Inj)  0 ml IV STAT PRN; Protocol


   PRN Reason: Hypoglycemia Protocol


Dextrose (Glutose 15)  0 gm PO ONCE PRN; Protocol


   PRN Reason: Hypoglycemia Protocol


Enoxaparin Sodium (Lovenox)  40 mg SC DAILY [facility]; Protocol


   Last Admin: 10/20/18 08:18 Dose:  40 mg


Famotidine (Pepcid)  20 mg IVP Q12 [facility]


   Last Admin: 10/20/18 08:22 Dose:  20 mg


Glucagon (Glucagen Diagnostic Kit)  0 mg IM STAT PRN; Protocol


   PRN Reason: Hypoglycemia Protocol


Hydromorphone HCl (Dilaudid)  1 mg IVP Q3 PRN


   PRN Reason: Pain, severe (8-10)


   Last Admin: 10/20/18 08:15 Dose:  1 mg


Potassium Chloride/Dextrose/Sod Cl (Potassium Chl 20 Meq In D5-Ns)  1,000 mls @ 

150 mls/hr IV .Q6H40M [facility]


   Last Admin: 10/20/18 00:54 Dose:  150 mls/hr


Multivitamins/Vitamin C 10 ml/Chromium/Copper/Manganese/Zinc 3 ml/ Amino 

Acids/Electrolytes/Dextrose  1,013 mls @ 42 mls/hr IV .Q24H [facility]


   Stop: 10/20/18 13:44


   Last Admin: 10/19/18 15:10 Dose:  42 mls/hr


Insulin Human Lispro (Humalog)  0 units SC ACCU-CHECK [facility]; Protocol


   Last Admin: 10/20/18 06:10 Dose:  2 units


Lidocaine (Lidoderm)  1 ea TD DAILY [facility]


   Last Admin: 10/20/18 08:17 Dose:  1 ea


Lorazepam (Ativan)  0.5 mg IVP Q6 PRN


   PRN Reason: Agitation


   Last Admin: 10/19/18 18:01 Dose:  0.5 mg


Nystatin (Mycostatin Oint)  1 applic TOP TID [facility]


   Last Admin: 10/20/18 08:19 Dose:  1 applic


Ondansetron HCl (Zofran Inj)  2 mg IVP Q6 PRN


   PRN Reason: Nausea/Vomiting


   Last Admin: 10/20/18 08:47 Dose:  2 mg











- Labs


Labs: 


                                        





                                 10/20/18 05:25 





                                 10/20/18 05:25 





                                        











PT  13.8 Seconds (9.8-13.1)  H  10/12/18  18:39    


 


INR  1.2   10/12/18  18:39    


 


APTT  30.1 Seconds (25.6-37.1)   10/12/18  18:39    














Attending/Attestation





- Attestation


I have personally seen and examined this patient.: Yes


I have fully participated in the care of the patient.: Yes


I have reviewed all pertinent clinical information, including history, physical 

exam and plan: Yes


Notes (Text): 





10/20/18 11:17


Agree with findings and plan as above. Patient improvment is waxing and waning. 

surgery on consult.

## 2018-10-20 NOTE — CP.PCM.PN
Subjective





- Date & Time of Evaluation


Date of Evaluation: 10/20/18


Time of Evaluation: 08:04





- Subjective


Subjective: 





Surgery: Dr. Meehan





Pt seen and examined. Had 2 episodes of emesis overnight ~700cc. Continues to 

pass flatus and small BMs.  Pain controlled.  





Objective





- Vital Signs/Intake and Output


Vital Signs (last 24 hours): 


                                        











Temp Pulse Resp BP Pulse Ox


 


 98.4 F   115 H  20   114/84   100 


 


 10/19/18 23:39  10/19/18 23:39  10/19/18 23:39  10/19/18 23:39  10/19/18 23:39








Intake and Output: 


                                        











 10/20/18 10/20/18





 06:59 18:59


 


Intake Total 2304 


 


Output Total 700 


 


Balance 1604 














- Medications


Medications: 


                               Current Medications





Benzocaine/Menthol (Cepacol Sore Throat)  1 judith PO Q2 PRN


   PRN Reason: Sore Throat


   Last Admin: 10/19/18 07:11 Dose:  1 judith


Dextrose (Dextrose 50% Inj)  0 ml IV STAT PRN; Protocol


   PRN Reason: Hypoglycemia Protocol


Dextrose (Glutose 15)  0 gm PO ONCE PRN; Protocol


   PRN Reason: Hypoglycemia Protocol


Enoxaparin Sodium (Lovenox)  40 mg SC DAILY [facility]; Protocol


   Last Admin: 10/19/18 08:55 Dose:  40 mg


Famotidine (Pepcid)  20 mg IVP Q12 [facility]


   Last Admin: 10/19/18 22:00 Dose:  20 mg


Glucagon (Glucagen Diagnostic Kit)  0 mg IM STAT PRN; Protocol


   PRN Reason: Hypoglycemia Protocol


Hydromorphone HCl (Dilaudid)  1 mg IVP Q3 PRN


   PRN Reason: Pain, severe (8-10)


   Last Admin: 10/20/18 04:36 Dose:  1 mg


Potassium Chloride/Dextrose/Sod Cl (Potassium Chl 20 Meq In D5-Ns)  1,000 mls @ 

150 mls/hr IV .Q6H40M [facility]


   Last Admin: 10/20/18 00:54 Dose:  150 mls/hr


Multivitamins/Vitamin C 10 ml/Chromium/Copper/Manganese/Zinc 3 ml/ Amino 

Acids/Electrolytes/Dextrose  1,013 mls @ 42 mls/hr IV .Q24H [facility]


   Stop: 10/20/18 13:44


   Last Admin: 10/19/18 15:10 Dose:  42 mls/hr


Insulin Human Lispro (Humalog)  0 units SC ACCU-CHECK [facility]; Protocol


   Last Admin: 10/20/18 06:10 Dose:  2 units


Lidocaine (Lidoderm)  1 ea TD DAILY [facility]


   Last Admin: 10/19/18 10:39 Dose:  1 ea


Lorazepam (Ativan)  0.5 mg IVP Q6 PRN


   PRN Reason: Agitation


   Last Admin: 10/19/18 18:01 Dose:  0.5 mg


Nystatin (Mycostatin Oint)  1 applic TOP TID [facility]


   Last Admin: 10/19/18 16:29 Dose:  1 applic


Ondansetron HCl (Zofran Inj)  2 mg IVP Q6 PRN


   PRN Reason: Nausea/Vomiting


   Last Admin: 10/20/18 00:19 Dose:  2 mg











- Labs


Labs: 


                                        





                                 10/20/18 05:25 





                                 10/20/18 05:25 





                                        











PT  13.8 Seconds (9.8-13.1)  H  10/12/18  18:39    


 


INR  1.2   10/12/18  18:39    


 


APTT  30.1 Seconds (25.6-37.1)   10/12/18  18:39    














- Constitutional


Appears: Non-toxic, No Acute Distress





- Head Exam


Head Exam: ATRAUMATIC, NORMOCEPHALIC





- Eye Exam


Eye Exam: EOMI





- ENT Exam


ENT Exam: Mucous Membranes Moist





- Neck Exam


Neck Exam: Full ROM





- Respiratory Exam


Respiratory Exam: NORMAL BREATHING PATTERN.  absent: Accessory Muscle Use, 

Respiratory Distress





- GI/Abdominal Exam


GI & Abdominal Exam: Soft.  absent: Distended, Firm, Guarding, Rigid, 

Tenderness, Rebound





- Extremities Exam


Extremities Exam: absent: Calf Tenderness, Pedal Edema





- Neurological Exam


Neurological Exam: Alert, Awake, Oriented x3





Assessment and Plan





- Assessment and Plan (Free Text)


Assessment: 





48F with Hx of metastatic Colon CA s/p L colectomy & oopherectomy in 2016 

presenting with SBO 


-Keep NPO


-will place NGT, pt strongly encouraged to leave NGT in place, it was explained 

to pt that her continued removal of NGT is not helping her


-c/w TPN


-serial abd exams


-encourage ambulation


-limit narcotics


-d/w attending





Florinda PGY4

## 2018-10-21 RX ADMIN — INSULIN LISPRO SCH: 100 INJECTION, SOLUTION INTRAVENOUS; SUBCUTANEOUS at 16:58

## 2018-10-21 RX ADMIN — DEXTROSE MONOHYDRATE, SODIUM CHLORIDE, AND POTASSIUM CHLORIDE SCH: 50; 9; 1.49 INJECTION, SOLUTION INTRAVENOUS at 12:31

## 2018-10-21 RX ADMIN — DEXTROSE MONOHYDRATE, SODIUM CHLORIDE, AND POTASSIUM CHLORIDE SCH MLS/HR: 50; 9; 1.49 INJECTION, SOLUTION INTRAVENOUS at 06:57

## 2018-10-21 RX ADMIN — INSULIN LISPRO SCH: 100 INJECTION, SOLUTION INTRAVENOUS; SUBCUTANEOUS at 12:29

## 2018-10-21 RX ADMIN — NYSTATIN SCH APPLIC: 100000 OINTMENT TOPICAL at 12:58

## 2018-10-21 RX ADMIN — PURIFIED WATER PRN LOZ: 99.05 LIQUID OPHTHALMIC at 22:08

## 2018-10-21 RX ADMIN — NYSTATIN SCH APPLIC: 100000 OINTMENT TOPICAL at 16:26

## 2018-10-21 RX ADMIN — PURIFIED WATER PRN LOZ: 99.05 LIQUID OPHTHALMIC at 11:54

## 2018-10-21 RX ADMIN — NYSTATIN SCH APPLIC: 100000 OINTMENT TOPICAL at 08:48

## 2018-10-21 RX ADMIN — PURIFIED WATER PRN LOZ: 99.05 LIQUID OPHTHALMIC at 16:58

## 2018-10-21 RX ADMIN — INSULIN LISPRO SCH: 100 INJECTION, SOLUTION INTRAVENOUS; SUBCUTANEOUS at 23:51

## 2018-10-21 RX ADMIN — ENOXAPARIN SODIUM SCH MG: 40 INJECTION SUBCUTANEOUS at 08:50

## 2018-10-21 RX ADMIN — DEXTROSE MONOHYDRATE, SODIUM CHLORIDE, AND POTASSIUM CHLORIDE SCH: 50; 9; 1.49 INJECTION, SOLUTION INTRAVENOUS at 17:55

## 2018-10-21 RX ADMIN — INSULIN LISPRO SCH UNITS: 100 INJECTION, SOLUTION INTRAVENOUS; SUBCUTANEOUS at 07:00

## 2018-10-21 NOTE — CP.PCM.PN
<Maria Teresa Morales - Last Filed: 10/21/18 15:50>





Subjective





- Date & Time of Evaluation


Date of Evaluation: 10/21/18


Time of Evaluation: 10:00





- Subjective


Subjective: 


Overnight pt had nausea and vomiting. Today pt feels fatigued, has abdominal and

LBP well controlled with pain medicine. Denies nausea, vomiting, diarrhea, 

constipation. Has not passed flatus this morning, Last BM was 2 days ago. 








Objective





- Vital Signs/Intake and Output


Vital Signs (last 24 hours): 


                                        











Temp Pulse Resp BP Pulse Ox


 


 97.7 F   82   18   101/69   99 


 


 10/21/18 08:26  10/21/18 08:26  10/21/18 08:26  10/21/18 08:26  10/21/18 08:26








Intake and Output: 


                                        











 10/21/18 10/21/18





 06:59 18:59


 


Intake Total  2304


 


Output Total  900


 


Balance  1404














- Medications


Medications: 


                               Current Medications





Benzocaine/Menthol (Cepacol Sore Throat)  1 judith PO Q2 PRN


   PRN Reason: Sore Throat


   Last Admin: 10/20/18 17:09 Dose:  1 judith


Dextrose (Dextrose 50% Inj)  0 ml IV STAT PRN; Protocol


   PRN Reason: Hypoglycemia Protocol


Dextrose (Glutose 15)  0 gm PO ONCE PRN; Protocol


   PRN Reason: Hypoglycemia Protocol


Enoxaparin Sodium (Lovenox)  40 mg SC DAILY [facility]; Protocol


   Last Admin: 10/21/18 08:50 Dose:  40 mg


Famotidine (Pepcid)  20 mg IVP Q12 [facility]


   Last Admin: 10/21/18 08:49 Dose:  20 mg


Glucagon (Glucagen Diagnostic Kit)  0 mg IM STAT PRN; Protocol


   PRN Reason: Hypoglycemia Protocol


Hydromorphone HCl (Dilaudid)  1 mg IVP Q3 PRN


   PRN Reason: Pain, severe (8-10)


   Last Admin: 10/21/18 06:51 Dose:  1 mg


Potassium Chloride/Dextrose/Sod Cl (Potassium Chl 20 Meq In D5-Ns)  1,000 mls @ 

150 mls/hr IV .Q6H40M [facility]


   Last Admin: 10/21/18 06:57 Dose:  150 mls/hr


Multivitamins/Vitamin C 10 ml/Chromium/Copper/Manganese/Zinc 3 ml/ Amino 

Acids/Electrolytes/Dextrose  1,013 mls @ 42 mls/hr IV .Q24H [facility]


   Last Admin: 10/20/18 17:09 Dose:  42 mls/hr


Insulin Human Lispro (Humalog)  0 units SC ACCU-CHECK [facility]; Protocol


   Last Admin: 10/21/18 07:00 Dose:  2 units


Lidocaine (Lidoderm)  1 ea TD DAILY [facility]


   Last Admin: 10/21/18 08:50 Dose:  1 ea


Lorazepam (Ativan)  0.5 mg IVP Q6 PRN


   PRN Reason: Agitation


   Last Admin: 10/20/18 22:28 Dose:  0.5 mg


Nystatin (Mycostatin Oint)  1 applic TOP TID [facility]


   Last Admin: 10/21/18 08:48 Dose:  1 applic


Ondansetron HCl (Zofran Inj)  2 mg IVP Q6 PRN


   PRN Reason: Nausea/Vomiting


   Last Admin: 10/21/18 00:58 Dose:  2 mg











- Labs


Labs: 


                                        





                                 10/20/18 05:25 





                                 10/20/18 05:25 





                                        











PT  13.8 Seconds (9.8-13.1)  H  10/12/18  18:39    


 


INR  1.2   10/12/18  18:39    


 


APTT  30.1 Seconds (25.6-37.1)   10/12/18  18:39    














- Constitutional


Appears: Non-toxic, No Acute Distress





- Head Exam


Head Exam: ATRAUMATIC, NORMAL INSPECTION, NORMOCEPHALIC


Additional comments: 


NG tube in place








- Eye Exam


Eye Exam: EOMI, Normal appearance





- ENT Exam


ENT Exam: Mucous Membranes Moist





- Respiratory Exam


Respiratory Exam: Clear to Ausculation Bilateral, NORMAL BREATHING PATTERN





- Cardiovascular Exam


Cardiovascular Exam: RRR, +S1, +S2





- GI/Abdominal Exam


GI & Abdominal Exam: Soft, Tenderness (RLQ and LLQ), Normal Bowel Sounds





- Extremities Exam


Extremities Exam: Normal Inspection





- Neurological Exam


Neurological Exam: Alert, Awake, Oriented x3





Assessment and Plan





- Assessment and Plan (Free Text)


Assessment: 


49 yo F with PMHx of HTN, chronic back/abdominal pain with multiple nerves 

blocks (pain management as out patient), Colon cancer w/ metastasis to liver (on

chemotherapy), Depression, Fibromyalgia, GERD admitted for SBO.





Small Bowel Obstruction possibly due to internal hernia


-Surgery consult, Dr. Velarde/Dr. Meehan, recommendations appreciated, 

management per Surgery 


-cont NPO, no ice chips, TPN, NGT


-C/w IVF @150


-Pain control: Hydromorphone, Zofran and famotidine 


-Gave Cathflo 2mg to flush port cath blood return confirmed





Chronic back pain 


- 2/2 metastatic colon Ca


- Chronic, H/O multiple lumbar surgeries. 


- Lidoderm patch for back pain


- Pain management consult, Dr. Winn, recommendations appreciated; Ativan for 

anxiety, for synergistic effect with opioid


- C/w pain management: Hydromorphine 1mg





NIDDM


- Controlled 


- Last A1c 7.7 on 5/28/18


- Low dose sliding scale


- Hold metformin 


- Hypoglycemia protocol 





HTN


- Controlled


- Hold Amlodipine 10 mg QD


- Monitor vitals





Skin Rash


-likely candida skin infection 


-nystatin TOP TID





Fibromyalgia


- Controlled


- Hold Gabapentin 300 mg TID


- Hold Duloxetine 60 mg PO HS





DVT PPX


-lovenox 40 sc





Code Status


-Full code








<Heike Bruno - Last Filed: 10/25/18 16:33>





Objective





- Vital Signs/Intake and Output


Vital Signs (last 24 hours): 


                                        











Temp Pulse Resp BP Pulse Ox


 


 98.3 F   109 H  18   106/74   98 


 


 10/25/18 16:31  10/25/18 16:31  10/25/18 16:31  10/25/18 16:31  10/25/18 16:31











- Medications


Medications: 


                               Current Medications





Benzocaine/Menthol (Cepacol Sore Throat)  1 judith PO Q2 PRN


   PRN Reason: Sore Throat


   Last Admin: 10/23/18 15:31 Dose:  1 judith


Dextrose (Dextrose 50% Inj)  0 ml IV STAT PRN; Protocol


   PRN Reason: Hypoglycemia Protocol


Dextrose (Glutose 15)  0 gm PO ONCE PRN; Protocol


   PRN Reason: Hypoglycemia Protocol


Enoxaparin Sodium (Lovenox)  40 mg SC DAILY [facility]; Protocol


   Last Admin: 10/24/18 08:51 Dose:  40 mg


Famotidine (Pepcid)  20 mg IVP Q12 [facility]


   Last Admin: 10/25/18 09:06 Dose:  20 mg


Fat Emulsion Intravenous (Intralipid 20%)  250 ml IV DAILY [facility]


Glucagon (Glucagen Diagnostic Kit)  0 mg IM STAT PRN; Protocol


   PRN Reason: Hypoglycemia Protocol


Hydromorphone HCl (Dilaudid)  1 mg IVP Q3 PRN


   PRN Reason: Pain, severe (8-10)


   Last Admin: 10/25/18 14:01 Dose:  1 mg


Potassium Chloride/Dextrose/Sod Cl (Potassium Chl 20 Meq In D5-Ns)  1,000 mls @ 

100 mls/hr IV .Q10H [facility]


   Last Admin: 10/25/18 11:54 Dose:  100 mls/hr


Multivitamins/Vitamin C 10 ml/Chromium/Copper/Manganese/Zinc 3 ml/ Amino 

Acids/Electrolytes/Dextrose  1,013 mls @ 80 mls/hr IV .[id number] ONE


   Stop: 10/26/18 04:54


Insulin Human Lispro (Humalog)  0 units SC ACCU-CHECK JAM; Protocol


   Last Admin: 10/25/18 12:51 Dose:  Not Given


Lidocaine (Lidoderm)  1 ea TD DAILY JAM


   Last Admin: 10/25/18 09:05 Dose:  1 ea


Lorazepam (Ativan)  0.5 mg IVP Q6 PRN


   PRN Reason: Agitation


   Last Admin: 10/25/18 11:49 Dose:  0.5 mg


Nystatin (Mycostatin Oint)  1 applic TOP TID JAM


   Last Admin: 10/25/18 14:51 Dose:  1 applic


Ondansetron HCl (Zofran Inj)  2 mg IVP Q6 PRN


   PRN Reason: Nausea/Vomiting


   Last Admin: 10/24/18 03:00 Dose:  2 mg











- Labs


Labs: 


                                        





                                 10/25/18 05:55 





                                 10/25/18 05:55 





                                        











PT  14.5 Seconds (9.8-13.1)  H  10/25/18  05:55    


 


INR  1.3   10/25/18  05:55    


 


APTT  29.5 Seconds (25.6-37.1)   10/25/18  05:55    














Attending/Attestation





- Attestation


I have personally seen and examined this patient.: Yes


I have fully participated in the care of the patient.: Yes


I have reviewed all pertinent clinical information, including history, physical 

exam and plan: Yes


Notes (Text): 





10/25/18 16:33


Agree with findings and plan as above.

## 2018-10-21 NOTE — CP.PCM.PN
Subjective





- Date & Time of Evaluation


Date of Evaluation: 10/21/18


Time of Evaluation: 07:49





- Subjective


Subjective: 





Surgery: Dr. Meehan





Pt seen and examined. NGT placed yesterday. Significant output. Pt states that 

she has been eating ice chips. Pain unchanged. Has intermittent flatus/BM.





Objective





- Vital Signs/Intake and Output


Vital Signs (last 24 hours): 


                                        











Temp Pulse Resp BP Pulse Ox


 


 97.8 F   97 H  18   113/80   100 


 


 10/20/18 22:45  10/20/18 22:45  10/20/18 22:45  10/20/18 22:45  10/20/18 22:45











- Medications


Medications: 


                               Current Medications





Benzocaine/Menthol (Cepacol Sore Throat)  1 judith PO Q2 PRN


   PRN Reason: Sore Throat


   Last Admin: 10/20/18 17:09 Dose:  1 judith


Dextrose (Dextrose 50% Inj)  0 ml IV STAT PRN; Protocol


   PRN Reason: Hypoglycemia Protocol


Dextrose (Glutose 15)  0 gm PO ONCE PRN; Protocol


   PRN Reason: Hypoglycemia Protocol


Enoxaparin Sodium (Lovenox)  40 mg SC DAILY [facility]; Protocol


   Last Admin: 10/20/18 08:18 Dose:  40 mg


Famotidine (Pepcid)  20 mg IVP Q12 [facility]


   Last Admin: 10/20/18 22:29 Dose:  20 mg


Glucagon (Glucagen Diagnostic Kit)  0 mg IM STAT PRN; Protocol


   PRN Reason: Hypoglycemia Protocol


Hydromorphone HCl (Dilaudid)  1 mg IVP Q3 PRN


   PRN Reason: Pain, severe (8-10)


   Last Admin: 10/21/18 06:51 Dose:  1 mg


Potassium Chloride/Dextrose/Sod Cl (Potassium Chl 20 Meq In D5-Ns)  1,000 mls @ 

150 mls/hr IV .Q6H40M [facility]


   Last Admin: 10/21/18 06:57 Dose:  150 mls/hr


Multivitamins/Vitamin C 10 ml/Chromium/Copper/Manganese/Zinc 3 ml/ Amino 

Acids/Electrolytes/Dextrose  1,013 mls @ 42 mls/hr IV .Q24H [facility]


   Last Admin: 10/20/18 17:09 Dose:  42 mls/hr


Insulin Human Lispro (Humalog)  0 units SC ACCU-CHECK J[facility]; Protocol


   Last Admin: 10/21/18 07:00 Dose:  2 units


Lidocaine (Lidoderm)  1 ea TD DAILY [facility]


   Last Admin: 10/20/18 08:17 Dose:  1 ea


Lorazepam (Ativan)  0.5 mg IVP Q6 PRN


   PRN Reason: Agitation


   Last Admin: 10/20/18 22:28 Dose:  0.5 mg


Nystatin (Mycostatin Oint)  1 applic TOP TID [facility]


   Last Admin: 10/20/18 17:02 Dose:  1 applic


Ondansetron HCl (Zofran Inj)  2 mg IVP Q6 PRN


   PRN Reason: Nausea/Vomiting


   Last Admin: 10/21/18 00:58 Dose:  2 mg











- Labs


Labs: 


                                        





                                 10/20/18 05:25 





                                 10/20/18 05:25 





                                        











PT  13.8 Seconds (9.8-13.1)  H  10/12/18  18:39    


 


INR  1.2   10/12/18  18:39    


 


APTT  30.1 Seconds (25.6-37.1)   10/12/18  18:39    














- Constitutional


Appears: Non-toxic, No Acute Distress





- Head Exam


Head Exam: ATRAUMATIC, NORMOCEPHALIC





- Eye Exam


Eye Exam: EOMI





- ENT Exam


ENT Exam: Mucous Membranes Moist





- Neck Exam


Neck Exam: Full ROM





- Respiratory Exam


Respiratory Exam: NORMAL BREATHING PATTERN.  absent: Accessory Muscle Use, 

Respiratory Distress





- GI/Abdominal Exam


GI & Abdominal Exam: Soft.  absent: Distended, Firm, Guarding, Rigid, 

Tenderness, Rebound





- Extremities Exam


Extremities Exam: absent: Calf Tenderness, Pedal Edema





- Neurological Exam


Neurological Exam: Awake, Oriented x3





Assessment and Plan





- Assessment and Plan (Free Text)


Assessment: 





48F with Hx of metastatic Colon CA s/p L colectomy & oopherectomy in 2016 

presenting with SBO 


-NGT placed yesterday: 2100cc during day shift, 900cc during night shift


-Will clamp NGT and check residuals this afternoon


-NO ice chips


-c/w TPN


-serial abd exams


-d/w attending





Florinda PGY4

## 2018-10-22 LAB
BUN SERPL-MCNC: 7 MG/DL (ref 7–17)
CALCIUM SERPL-MCNC: 9.3 MG/DL (ref 8.4–10.2)
ERYTHROCYTE [DISTWIDTH] IN BLOOD BY AUTOMATED COUNT: 15.6 % (ref 11.5–14.5)
GFR NON-AFRICAN AMERICAN: > 60
HGB BLD-MCNC: 12.7 G/DL (ref 12–16)
MCH RBC QN AUTO: 27.4 PG (ref 27–31)
MCHC RBC AUTO-ENTMCNC: 32.4 G/DL (ref 33–37)
MCV RBC AUTO: 84.6 FL (ref 81–99)
PLATELET # BLD: 202 K/UL (ref 130–400)
RBC # BLD AUTO: 4.64 MIL/UL (ref 3.8–5.2)
WBC # BLD AUTO: 6.6 K/UL (ref 4.8–10.8)

## 2018-10-22 RX ADMIN — PURIFIED WATER PRN LOZ: 99.05 LIQUID OPHTHALMIC at 13:37

## 2018-10-22 RX ADMIN — DEXTROSE MONOHYDRATE, SODIUM CHLORIDE, AND POTASSIUM CHLORIDE SCH MLS/HR: 50; 9; 1.49 INJECTION, SOLUTION INTRAVENOUS at 23:20

## 2018-10-22 RX ADMIN — NYSTATIN SCH APPLIC: 100000 OINTMENT TOPICAL at 09:10

## 2018-10-22 RX ADMIN — INSULIN LISPRO SCH: 100 INJECTION, SOLUTION INTRAVENOUS; SUBCUTANEOUS at 22:00

## 2018-10-22 RX ADMIN — DEXTROSE MONOHYDRATE, SODIUM CHLORIDE, AND POTASSIUM CHLORIDE SCH MLS/HR: 50; 9; 1.49 INJECTION, SOLUTION INTRAVENOUS at 23:19

## 2018-10-22 RX ADMIN — DEXTROSE MONOHYDRATE, SODIUM CHLORIDE, AND POTASSIUM CHLORIDE SCH MLS/HR: 50; 9; 1.49 INJECTION, SOLUTION INTRAVENOUS at 23:21

## 2018-10-22 RX ADMIN — NYSTATIN SCH APPLIC: 100000 OINTMENT TOPICAL at 13:00

## 2018-10-22 RX ADMIN — PURIFIED WATER PRN LOZ: 99.05 LIQUID OPHTHALMIC at 01:39

## 2018-10-22 RX ADMIN — INSULIN LISPRO SCH: 100 INJECTION, SOLUTION INTRAVENOUS; SUBCUTANEOUS at 12:00

## 2018-10-22 RX ADMIN — ENOXAPARIN SODIUM SCH MG: 40 INJECTION SUBCUTANEOUS at 09:10

## 2018-10-22 RX ADMIN — INSULIN LISPRO SCH: 100 INJECTION, SOLUTION INTRAVENOUS; SUBCUTANEOUS at 07:07

## 2018-10-22 RX ADMIN — NYSTATIN SCH APPLIC: 100000 OINTMENT TOPICAL at 16:48

## 2018-10-22 RX ADMIN — INSULIN LISPRO SCH: 100 INJECTION, SOLUTION INTRAVENOUS; SUBCUTANEOUS at 16:33

## 2018-10-22 RX ADMIN — PURIFIED WATER PRN LOZ: 99.05 LIQUID OPHTHALMIC at 06:20

## 2018-10-22 NOTE — CP.PCM.PN
Addendum entered and electronically signed by Ariadna Cortes MD  10/22/18 14:36: 


Patient seen and examined . All chart and clinical data reviewed .Agree with 

resident assessment and plan.


patient still with abdominal pain , nausea, passing flatus but no BM 


NGT in place to intermittent suction 


CT abdomen today showed :


Mechanical small bowel obstruction with point of transition again identified in 

left lower quadrant of abdomen.  Oral contrast seen distal to point of 

obstruction consistent with incomplete mechanical obstruction.  Nasogastric 

tube.  Suspect neoplasm posterior right hepatic lobe. 





Continue current management 


Reordered TPN 


will need to discuss hospice or transfer to [facility] for evaluation 

by her physicians











Original Note:








Subjective





- Date & Time of Evaluation


Date of Evaluation: 10/22/18


Time of Evaluation: 09:20





- Subjective


Subjective: 





No acute overnight events. Pt states that she is feeling well today, minimal 

abdominal pain. Passing gas, NTG on suction. 





Objective





- Vital Signs/Intake and Output


Vital Signs (last 24 hours): 


                                        











Temp Pulse Resp BP Pulse Ox


 


 98.1 F   75   20   122/82   100 


 


 10/22/18 08:12  10/22/18 08:12  10/22/18 08:12  10/22/18 08:12  10/22/18 08:12











- Medications


Medications: 


                               Current Medications





Benzocaine/Menthol (Cepacol Sore Throat)  1 judith PO Q2 PRN


   PRN Reason: Sore Throat


   Last Admin: 10/22/18 06:20 Dose:  1 judith


Dextrose (Dextrose 50% Inj)  0 ml IV STAT PRN; Protocol


   PRN Reason: Hypoglycemia Protocol


Dextrose (Glutose 15)  0 gm PO ONCE PRN; Protocol


   PRN Reason: Hypoglycemia Protocol


Enoxaparin Sodium (Lovenox)  40 mg SC DAILY JAM; Protocol


   Last Admin: 10/22/18 09:10 Dose:  40 mg


Famotidine (Pepcid)  20 mg IVP Q12 [facility]


   Last Admin: 10/22/18 09:16 Dose:  20 mg


Glucagon (Glucagen Diagnostic Kit)  0 mg IM STAT PRN; Protocol


   PRN Reason: Hypoglycemia Protocol


Hydromorphone HCl (Dilaudid)  1 mg IVP Q3 PRN


   PRN Reason: Pain, severe (8-10)


   Last Admin: 10/22/18 08:54 Dose:  1 mg


Potassium Chloride/Dextrose/Sod Cl (Potassium Chl 20 Meq In D5-Ns)  1,000 mls @ 

150 mls/hr IV .Q6H40M [facility]


   Last Admin: 10/21/18 17:55 Dose:  Not Given


Multivitamins/Vitamin C 10 ml/Chromium/Copper/Manganese/Zinc 3 ml/ Amino 

Acids/Electrolytes/Dextrose  1,013 mls @ 42 mls/hr IV .Q24H [facility]


   Last Admin: 10/21/18 17:54 Dose:  Not Given


Multivitamins/Vitamin C 10 ml/Chromium/Copper/Manganese/Zinc 3 ml/ Amino 

Acids/Electrolytes/Dextrose  1,013 mls @ 42 mls/hr IV .Q24H ONE


   Stop: 10/22/18 15:44


   Last Admin: 10/21/18 16:58 Dose:  42 mls/hr


Insulin Human Lispro (Humalog)  0 units SC ACCU-CHECK [facility]; Protocol


   Last Admin: 10/22/18 07:07 Dose:  Not Given


Lidocaine (Lidoderm)  1 ea TD DAILY [facility]


   Last Admin: 10/22/18 09:09 Dose:  1 ea


Lorazepam (Ativan)  0.5 mg IVP Q6 PRN


   PRN Reason: Agitation


   Last Admin: 10/21/18 20:42 Dose:  0.5 mg


Nystatin (Mycostatin Oint)  1 applic TOP TID [facility]


   Last Admin: 10/22/18 09:10 Dose:  1 applic


Ondansetron HCl (Zofran Inj)  2 mg IVP Q6 PRN


   PRN Reason: Nausea/Vomiting


   Last Admin: 10/22/18 06:20 Dose:  2 mg











- Labs


Labs: 


                                        





                                 10/22/18 05:45 





                                 10/22/18 05:45 





                                        











PT  13.8 Seconds (9.8-13.1)  H  10/12/18  18:39    


 


INR  1.2   10/12/18  18:39    


 


APTT  30.1 Seconds (25.6-37.1)   10/12/18  18:39    














- Constitutional


Appears: No Acute Distress, Other (NTG on, draining greenish fluid)





- Eye Exam


Eye Exam: EOMI, Normal appearance





- ENT Exam


ENT Exam: Mucous Membranes Moist





- Respiratory Exam


Respiratory Exam: Clear to Ausculation Bilateral, NORMAL BREATHING PATTERN.  

absent: Wheezes





- Cardiovascular Exam


Cardiovascular Exam: REGULAR RHYTHM, +S1, +S2





- GI/Abdominal Exam


GI & Abdominal Exam: Soft, Normal Bowel Sounds.  absent: Guarding, Tenderness


Additional comments: 





Multiple old surgical scars noted





- Extremities Exam


Extremities Exam: Normal Inspection





- Neurological Exam


Neurological Exam: Alert, Awake, Oriented x3





- Skin


Additional comments: 





small 3x2 cm patch noted below the L breast fold 





Assessment and Plan





- Assessment and Plan (Free Text)


Assessment: 





Assessment/Plan:


49 YO Female with PMHx of HTN, chronic back/abdominal pain with multiple nerves 

blocks (pain management as out patient), Colon cancer w/ metastasis to liver (on

chemotherapy), Depression, Fibromyalgia, GERD admitted for SBO.





Small Bowel Obstruction possibly due to internal hernia


- Surgery consult, Dr. Veladre/Dr. Meehan, recommendations appreciated, 

management per Surgery 


- cont NPO, TPN, NGT, cont IVF


- C/w IVF, Pain control: Hydromorphone, Zofran and famotidine 


- CT abd and pelvis pending 





Chronic back pain 


- 2/2 metastatic colon Ca


- Chronic, H/O multiple lumbar surgeries. 


- Lidoderm patch for back pain


- Pain management consult, Dr. Winn, recommendations appreciated; Ativan for 

anxiety, for synergistic effect with opioid


- C/w pain management: Hydromorphine 1.5mg





NIDDM


- Controlled 


- Last A1c 7.7 on 5/28/18


- Low dose sliding scale


- Hold metformin 


- Hypoglycemia protocol 





HTN


- Controlled


- Hold Amlodipine 10 mg QD


- Monitor vitals





Skin Rash


-improving 


-likely candida skin infection 


-nystatin TOP TID





Fibromyalgia


- Controlled


- Hold Gabapentin 300 mg TID


- Hold Duloxetine 60 mg PO HS





DVT


-lovenox sc

## 2018-10-22 NOTE — CT
Date of service: 



10/22/2018



PROCEDURE:  CT Abdomen and Pelvis with contrast



HISTORY:

SBO



COMPARISON:

10/12/2018



TECHNIQUE:

Contrast dose: 95 mL Omnipaque 300



Radiation dose:



Total exam DLP = 484.81 mGy-cm.



This CT exam was performed using one or more of the following dose 

reduction techniques: Automated exposure control, adjustment of the 

mA and/or kV according to patient size, and/or use of iterative 

reconstruction technique.



FINDINGS:



LOWER THORAX:

Nasogastric tube.  Central venous catheter.  No infiltrate/effusion. 



LIVER:

Normal size.  Heterogeneous low-attenuation posterior right hepatic 

lobe suspicious for neoplasm.  No change from previous.  No evidence 

of biliary obstruction. 



GALLBLADDER AND BILE DUCTS:

Not visualized.  Possible prior cholecystectomy. 



PANCREAS:

Unremarkable. No gross lesion or ductal dilatation.



SPLEEN:

Unremarkable. 



ADRENALS:

Unremarkable. No mass. 



KIDNEYS AND URETERS:

Unremarkable. No hydronephrosis. No solid mass. 



VASCULATURE:

Unremarkable. No aortic aneurysm. No aortic atherosclerotic 

calcification or mural plaque present.



BOWEL:

Findings consistent with mechanical small-bowel obstruction.  Point 

of transition identified in left lower quadrant of abdomen on series 

3, image 113.  Possible internal hernia.  Dilated proximal small 

bowel loops.  Collapsed ileum distal to point of obstruction.  Please 

note that there is some oral contrast seen within collapsed ileum 

indicating incomplete mechanical small-bowel obstruction.  



APPENDIX:

Normal appendix. 



PERITONEUM:

No ascites or pneumoperitoneum. 



LYMPH NODES:

Unremarkable. No enlarged lymph nodes. 



BLADDER:

Unremarkable. 



REPRODUCTIVE:

Oval uterus 



BONES:

No acute fracture 



OTHER FINDINGS:

None.



IMPRESSION:

Findings consistent with mechanical small bowel obstruction with 

point of transition again identified in left lower quadrant of 

abdomen.  Oral contrast seen distal to point of obstruction 

consistent with incomplete mechanical obstruction.  Nasogastric tube. 

 Suspect neoplasm posterior right hepatic lobe.  Further evaluation 

suggested.

## 2018-10-22 NOTE — CP.PCM.PN
Subjective





- Date & Time of Evaluation


Date of Evaluation: 10/22/18


Time of Evaluation: 07:00





- Subjective


Subjective: 


GENERAL SURGERY PROGRESS NOTE FOR DR. EDWARDS





Patient seen and examined at bedside. Yesterday, her NG tube was clamped but pt 

had nausea so it was placed back on suction. From 3pm-7pm, pt had 500cc gastric 

output from NG tube. From 7p-7a, pt had an additional 300cc ouput from NG tube. 

Patient reports passing flatus but no BM.





Objective





- Vital Signs/Intake and Output


Vital Signs (last 24 hours): 


                                        











Temp Pulse Resp BP Pulse Ox


 


 98.0 F   83   18   113/79   100 


 


 10/21/18 23:49  10/21/18 23:49  10/21/18 23:49  10/21/18 23:49  10/21/18 23:49











- Medications


Medications: 


                               Current Medications





Benzocaine/Menthol (Cepacol Sore Throat)  1 judith PO Q2 PRN


   PRN Reason: Sore Throat


   Last Admin: 10/22/18 06:20 Dose:  1 judith


Dextrose (Dextrose 50% Inj)  0 ml IV STAT PRN; Protocol


   PRN Reason: Hypoglycemia Protocol


Dextrose (Glutose 15)  0 gm PO ONCE PRN; Protocol


   PRN Reason: Hypoglycemia Protocol


Enoxaparin Sodium (Lovenox)  40 mg SC DAILY [facility]; Protocol


   Last Admin: 10/21/18 08:50 Dose:  40 mg


Famotidine (Pepcid)  20 mg IVP Q12 [facility]


   Last Admin: 10/21/18 23:00 Dose:  20 mg


Glucagon (Glucagen Diagnostic Kit)  0 mg IM STAT PRN; Protocol


   PRN Reason: Hypoglycemia Protocol


Hydromorphone HCl (Dilaudid)  1 mg IVP Q3 PRN


   PRN Reason: Pain, severe (8-10)


   Last Admin: 10/22/18 06:08 Dose:  1 mg


Potassium Chloride/Dextrose/Sod Cl (Potassium Chl 20 Meq In D5-Ns)  1,000 mls @ 

150 mls/hr IV .Q6H40M [facility]


   Last Admin: 10/21/18 17:55 Dose:  Not Given


Multivitamins/Vitamin C 10 ml/Chromium/Copper/Manganese/Zinc 3 ml/ Amino 

Acids/Electrolytes/Dextrose  1,013 mls @ 42 mls/hr IV .Q24H [facility]


   Last Admin: 10/21/18 17:54 Dose:  Not Given


Multivitamins/Vitamin C 10 ml/Chromium/Copper/Manganese/Zinc 3 ml/ Amino 

Acids/Electrolytes/Dextrose  1,013 mls @ 42 mls/hr IV .Q24H ONE


   Stop: 10/22/18 15:44


   Last Admin: 10/21/18 16:58 Dose:  42 mls/hr


Insulin Human Lispro (Humalog)  0 units SC ACCU-CHECK [facility]; Protocol


   Last Admin: 10/22/18 07:07 Dose:  Not Given


Lidocaine (Lidoderm)  1 ea TD DAILY [facility]


   Last Admin: 10/21/18 08:50 Dose:  1 ea


Lorazepam (Ativan)  0.5 mg IVP Q6 PRN


   PRN Reason: Agitation


   Last Admin: 10/21/18 20:42 Dose:  0.5 mg


Nystatin (Mycostatin Oint)  1 applic TOP TID [facility]


   Last Admin: 10/21/18 16:26 Dose:  1 applic


Ondansetron HCl (Zofran Inj)  2 mg IVP Q6 PRN


   PRN Reason: Nausea/Vomiting


   Last Admin: 10/22/18 06:20 Dose:  2 mg











- Labs


Labs: 


                                        





                                 10/22/18 05:45 





                                 10/22/18 05:45 





                                        











PT  13.8 Seconds (9.8-13.1)  H  10/12/18  18:39    


 


INR  1.2   10/12/18  18:39    


 


APTT  30.1 Seconds (25.6-37.1)   10/12/18  18:39    














- Constitutional


Appears: Non-toxic, No Acute Distress, Chronically Ill





- Head Exam


Head Exam: ATRAUMATIC, NORMAL INSPECTION





- Respiratory Exam


Respiratory Exam: NORMAL BREATHING PATTERN.  absent: Respiratory Distress





- Cardiovascular Exam


Cardiovascular Exam: +S1, +S2





- GI/Abdominal Exam


GI & Abdominal Exam: Soft.  absent: Tenderness, Rebound





- Neurological Exam


Neurological Exam: Alert, Awake, Oriented x3





- Psychiatric Exam


Psychiatric exam: Normal Affect, Normal Mood





- Skin


Skin Exam: Normal Color, Warm





Assessment and Plan





- Assessment and Plan (Free Text)


Assessment: 


48F w/ hx of metastatic colon CA s/p L colectomy and oopherectomy presenting 

with SBO





- NG tube placed back to suction yesterday afternoon, 3pm - 7am = ~800cc output


- Limit narcotics


- Continue strict NPO, no ice chips


- Serial abd exams


- CT Abd/Pelvis with contrast ordered to evaluate SBO


- Discussed plan with Dr. Zoran Manrique PGY-4

## 2018-10-23 LAB
ALBUMIN SERPL-MCNC: 3.5 G/DL (ref 3.5–5)
ALBUMIN/GLOB SERPL: 1 {RATIO} (ref 1–2.1)
ALT SERPL-CCNC: 52 U/L (ref 9–52)
AST SERPL-CCNC: 32 U/L (ref 14–36)
BUN SERPL-MCNC: 10 MG/DL (ref 7–17)
CALCIUM SERPL-MCNC: 9.1 MG/DL (ref 8.4–10.2)
ERYTHROCYTE [DISTWIDTH] IN BLOOD BY AUTOMATED COUNT: 15.1 % (ref 11.5–14.5)
GFR NON-AFRICAN AMERICAN: > 60
HGB BLD-MCNC: 12.9 G/DL (ref 12–16)
MCH RBC QN AUTO: 27.5 PG (ref 27–31)
MCHC RBC AUTO-ENTMCNC: 32.8 G/DL (ref 33–37)
MCV RBC AUTO: 83.9 FL (ref 81–99)
PLATELET # BLD: 207 K/UL (ref 130–400)
RBC # BLD AUTO: 4.7 MIL/UL (ref 3.8–5.2)
WBC # BLD AUTO: 5.7 K/UL (ref 4.8–10.8)

## 2018-10-23 RX ADMIN — NYSTATIN SCH APPLIC: 100000 OINTMENT TOPICAL at 13:00

## 2018-10-23 RX ADMIN — INSULIN LISPRO SCH: 100 INJECTION, SOLUTION INTRAVENOUS; SUBCUTANEOUS at 18:00

## 2018-10-23 RX ADMIN — DEXTROSE MONOHYDRATE, SODIUM CHLORIDE, AND POTASSIUM CHLORIDE SCH MLS/HR: 50; 9; 1.49 INJECTION, SOLUTION INTRAVENOUS at 18:03

## 2018-10-23 RX ADMIN — NYSTATIN SCH APPLIC: 100000 OINTMENT TOPICAL at 10:42

## 2018-10-23 RX ADMIN — NYSTATIN SCH APPLIC: 100000 OINTMENT TOPICAL at 18:01

## 2018-10-23 RX ADMIN — PURIFIED WATER PRN LOZ: 99.05 LIQUID OPHTHALMIC at 15:31

## 2018-10-23 RX ADMIN — INSULIN LISPRO SCH: 100 INJECTION, SOLUTION INTRAVENOUS; SUBCUTANEOUS at 23:11

## 2018-10-23 RX ADMIN — INSULIN LISPRO SCH UNITS: 100 INJECTION, SOLUTION INTRAVENOUS; SUBCUTANEOUS at 10:44

## 2018-10-23 RX ADMIN — ENOXAPARIN SODIUM SCH MG: 40 INJECTION SUBCUTANEOUS at 10:42

## 2018-10-23 RX ADMIN — DEXTROSE MONOHYDRATE, SODIUM CHLORIDE, AND POTASSIUM CHLORIDE SCH: 50; 9; 1.49 INJECTION, SOLUTION INTRAVENOUS at 02:55

## 2018-10-23 RX ADMIN — INSULIN LISPRO SCH UNITS: 100 INJECTION, SOLUTION INTRAVENOUS; SUBCUTANEOUS at 12:00

## 2018-10-23 NOTE — CP.PCM.PN
Subjective





- Date & Time of Evaluation


Date of Evaluation: 10/23/18


Time of Evaluation: 07:50





- Subjective


Subjective: 





Surgery: Dr. Meehan





Pt seen and examined. No acute events overnight. Pt continues to have pain and 

nausea. No vomiting overnight. No BM, +flatus. 





Objective





- Vital Signs/Intake and Output


Vital Signs (last 24 hours): 


                                        











Temp Pulse Resp BP Pulse Ox


 


 97.7 F   87   18   117/82   100 


 


 10/22/18 23:53  10/22/18 23:53  10/22/18 23:53  10/22/18 23:53  10/22/18 23:53








Intake and Output: 


                                        











 10/23/18 10/23/18





 06:59 18:59


 


Intake Total 1000 


 


Output Total 1150 


 


Balance -150 














- Medications


Medications: 


                               Current Medications





Benzocaine/Menthol (Cepacol Sore Throat)  1 judith PO Q2 PRN


   PRN Reason: Sore Throat


   Last Admin: 10/22/18 13:37 Dose:  1 judith


Dextrose (Dextrose 50% Inj)  0 ml IV STAT PRN; Protocol


   PRN Reason: Hypoglycemia Protocol


Dextrose (Glutose 15)  0 gm PO ONCE PRN; Protocol


   PRN Reason: Hypoglycemia Protocol


Enoxaparin Sodium (Lovenox)  40 mg SC DAILY [facility]; Protocol


   Last Admin: 10/22/18 09:10 Dose:  40 mg


Famotidine (Pepcid)  20 mg IVP Q12 [facility]


   Last Admin: 10/22/18 21:09 Dose:  20 mg


Glucagon (Glucagen Diagnostic Kit)  0 mg IM STAT PRN; Protocol


   PRN Reason: Hypoglycemia Protocol


Hydromorphone HCl (Dilaudid)  1 mg IVP Q3 PRN


   PRN Reason: Pain, severe (8-10)


   Last Admin: 10/23/18 05:55 Dose:  1 mg


Potassium Chloride/Dextrose/Sod Cl (Potassium Chl 20 Meq In D5-Ns)  1,000 mls @ 

150 mls/hr IV .Q6H40M [facility]


   Last Admin: 10/23/18 02:55 Dose:  Not Given


Chromium/Copper/Manganese/Zinc 3 ml/ Multivitamins/Vitamin C 10 ml/ Amino 

Acids/Electrolytes/Dextrose  1,013 mls @ 80 mls/hr IV .[id number] [facility]


   Last Admin: 10/22/18 16:44 Dose:  80 mls/hr


Amino Acids/Electrolytes/Dextrose (Clinimix 5/20 % "E" (1000 Ml))  1,000 mls @ 

80 mls/hr IV .[id number] [facility]


   Last Admin: 10/23/18 03:22 Dose:  80 mls/hr


Insulin Human Lispro (Humalog)  0 units SC ACCU-CHECK [facility]; Protocol


   Last Admin: 10/22/18 22:00 Dose:  Not Given


Lidocaine (Lidoderm)  1 ea TD DAILY [facility]


   Last Admin: 10/22/18 09:09 Dose:  1 ea


Lidocaine HCl (Lidocaine 2% Viscous)  15 ml PO Q8 PRN


   PRN Reason: Sore Throat


Lorazepam (Ativan)  0.5 mg IVP Q6 PRN


   PRN Reason: Agitation


   Last Admin: 10/23/18 02:36 Dose:  0.5 mg


Nystatin (Mycostatin Oint)  1 applic TOP TID [facility]


   Last Admin: 10/22/18 16:48 Dose:  1 applic


Ondansetron HCl (Zofran Inj)  2 mg IVP Q6 PRN


   PRN Reason: Nausea/Vomiting


   Last Admin: 10/22/18 23:12 Dose:  2 mg











- Labs


Labs: 


                                        





                                 10/23/18 06:10 





                                 10/23/18 06:10 





                                        











PT  13.8 Seconds (9.8-13.1)  H  10/12/18  18:39    


 


INR  1.2   10/12/18  18:39    


 


APTT  30.1 Seconds (25.6-37.1)   10/12/18  18:39    














- Constitutional


Appears: Non-toxic, No Acute Distress





- Head Exam


Head Exam: ATRAUMATIC, NORMOCEPHALIC





- Eye Exam


Eye Exam: EOMI





- ENT Exam


ENT Exam: Mucous Membranes Moist





- Neck Exam


Neck Exam: Full ROM





- Respiratory Exam


Respiratory Exam: NORMAL BREATHING PATTERN.  absent: Accessory Muscle Use, 

Respiratory Distress





- GI/Abdominal Exam


GI & Abdominal Exam: Soft, Tenderness, Rebound.  absent: Distended, Firm, 

Guarding, Rigid





- Extremities Exam


Extremities Exam: absent: Calf Tenderness, Pedal Edema





- Neurological Exam


Neurological Exam: Alert, Awake, Oriented x3





Assessment and Plan





- Assessment and Plan (Free Text)


Assessment: 





48F w/ hx of metastatic colon CA s/p L colectomy and oopherectomy presenting 

with SBO


-NGT: 600cc/12hr bilious day shift, 200cc/12hr bilious night shift


-Keep NGT to sxn


-NPO


-TPN


-CT scan from 10/22 reviewed


-If pt does not have resolution of symptoms by Thursday, will plan on OR 

Thursday


-case d/w attending





Zemaitis PGY4

## 2018-10-23 NOTE — CP.PCM.PN
Addendum entered and electronically signed by Ariadna Cortes MD  10/23/18 15:33: 


Patient seen bedside . All chart and clinical data reviewed. Discussed with 

resident . Agree with assessment and plan.


NGT in place . Patient still with nausea and passing flatus 


Complains of some abdominal pain but better today


Keep NPO and continue TPN


Surgery on board following . Possible OR by Thursday if no improvement 


Patient appears to be frustrated by the whole situation.











Original Note:








Subjective





- Date & Time of Evaluation


Date of Evaluation: 10/23/18


Time of Evaluation: 09:15





- Subjective


Subjective: 





No acute overnight pt. Pt remains NPO, NGT on suction. Pt endorsing abd pain, 

and soreness in her throat due to the NG tube. +flatus, no BM. 





Objective





- Vital Signs/Intake and Output


Vital Signs (last 24 hours): 


                                        











Temp Pulse Resp BP Pulse Ox


 


 97.8 F   79   20   124/84   98 


 


 10/23/18 08:16  10/23/18 08:16  10/23/18 08:16  10/23/18 08:16  10/23/18 08:16








Intake and Output: 


                                        











 10/23/18 10/23/18





 06:59 18:59


 


Intake Total 1000 


 


Output Total 1150 


 


Balance -150 














- Medications


Medications: 


                               Current Medications





Benzocaine/Menthol (Cepacol Sore Throat)  1 judith PO Q2 PRN


   PRN Reason: Sore Throat


   Last Admin: 10/22/18 13:37 Dose:  1 ujdith


Dextrose (Dextrose 50% Inj)  0 ml IV STAT PRN; Protocol


   PRN Reason: Hypoglycemia Protocol


Dextrose (Glutose 15)  0 gm PO ONCE PRN; Protocol


   PRN Reason: Hypoglycemia Protocol


Enoxaparin Sodium (Lovenox)  40 mg SC DAILY [facility]; Protocol


   Last Admin: 10/22/18 09:10 Dose:  40 mg


Famotidine (Pepcid)  20 mg IVP Q12 [facility]


   Last Admin: 10/22/18 21:09 Dose:  20 mg


Glucagon (Glucagen Diagnostic Kit)  0 mg IM STAT PRN; Protocol


   PRN Reason: Hypoglycemia Protocol


Hydromorphone HCl (Dilaudid)  1 mg IVP Q3 PRN


   PRN Reason: Pain, severe (8-10)


   Last Admin: 10/23/18 09:03 Dose:  1 mg


Chromium/Copper/Manganese/Zinc 3 ml/ Multivitamins/Vitamin C 10 ml/ Amino 

Acids/Electrolytes/Dextrose  1,013 mls @ 80 mls/hr IV .[id number] [facility]


   Last Admin: 10/22/18 16:44 Dose:  80 mls/hr


Amino Acids/Electrolytes/Dextrose (Clinimix 5/20 % "E" (1000 Ml))  1,000 mls @ 

80 mls/hr IV .[id number] [facility]


   Last Admin: 10/23/18 03:22 Dose:  80 mls/hr


Potassium Chloride/Dextrose/Sod Cl (Potassium Chl 20 Meq In D5-Ns)  1,000 mls @ 

50 mls/hr IV .Q20H [facility]


Insulin Human Lispro (Humalog)  0 units SC ACCU-CHECK [facility]; Protocol


   Last Admin: 10/22/18 22:00 Dose:  Not Given


Lidocaine (Lidoderm)  1 ea TD DAILY [facility]


   Last Admin: 10/22/18 09:09 Dose:  1 ea


Lidocaine HCl (Lidocaine 2% Viscous)  15 ml PO Q8 PRN


   PRN Reason: Sore Throat


Lorazepam (Ativan)  0.5 mg IVP Q6 PRN


   PRN Reason: Agitation


   Last Admin: 10/23/18 08:37 Dose:  0.5 mg


Nystatin (Mycostatin Oint)  1 applic TOP TID [facility]


   Last Admin: 10/22/18 16:48 Dose:  1 applic


Ondansetron HCl (Zofran Inj)  2 mg IVP Q6 PRN


   PRN Reason: Nausea/Vomiting


   Last Admin: 10/22/18 23:12 Dose:  2 mg











- Labs


Labs: 


                                        





                                 10/23/18 06:10 





                                 10/23/18 06:10 





                                        











PT  13.8 Seconds (9.8-13.1)  H  10/12/18  18:39    


 


INR  1.2   10/12/18  18:39    


 


APTT  30.1 Seconds (25.6-37.1)   10/12/18  18:39    














- Constitutional


Appears: No Acute Distress, Other (NTG on wall suction, green output )





- Eye Exam


Eye Exam: Normal appearance





- ENT Exam


ENT Exam: Mucous Membranes Moist





- Respiratory Exam


Respiratory Exam: Clear to Ausculation Bilateral, NORMAL BREATHING PATTERN.  

absent: Wheezes





- Cardiovascular Exam


Cardiovascular Exam: REGULAR RHYTHM, +S1, +S2





- GI/Abdominal Exam


GI & Abdominal Exam: Soft, Hyperactive Bowel Sounds.  absent: Guarding, 

Tenderness


Additional comments: 





Old surgical scars noted 





- Extremities Exam


Extremities Exam: Normal Inspection.  absent: Calf Tenderness, Pedal Edema





- Neurological Exam


Neurological Exam: Alert, Awake





Assessment and Plan





- Assessment and Plan (Free Text)


Assessment: 


Assessment/Plan:


49 YO Female with PMHx of HTN, chronic back/abdominal pain with multiple nerves 

blocks (pain management as out patient), Colon cancer w/ metastasis to liver (on

chemotherapy), Depression, Fibromyalgia, GERD admitted for SBO.


Discussion with patient about transfer to [facility] for evaluation by 

her physicians, but patient refused at this time. She would like to stay in [facility]

for further care and management. 





Small Bowel Obstruction possibly due to internal hernia


- Surgery consult, Dr. Velarde/Dr. Meehan, recommendations appreciated: OR 

Thursday if no resolution 


- cont NPO, TPN, NGT, cont IVF


- C/w IVF, Pain control: Hydromorphone, Zofran and famotidine 


- CT abd and pelvis incomplete mechanical obstruction.


 


Chronic back pain 


- 2/2 metastatic colon Ca


- Chronic, H/O multiple lumbar surgeries. 


- Lidoderm patch for back pain


- Pain management consult, Dr. Winn, recommendations appreciated; Ativan for 

anxiety, for synergistic effect with opioid


- C/w pain management: Hydromorphine 1.5mg





NIDDM


- Controlled 


- Last A1c 7.7 on 5/28/18


- Low dose sliding scale


- Hold metformin 


- Hypoglycemia protocol 





HTN


- Controlled


- Hold Amlodipine 10 mg QD


- Monitor vitals





Skin Rash


-improving 


-likely candida skin infection 


-nystatin TOP TID





Sore throat


-2/2 to NGT


-cephacol and Lidocaine PO





Fibromyalgia


- Controlled


- Hold Gabapentin 300 mg TID


- Hold Duloxetine 60 mg PO HS





DVT


-lovenox sc

## 2018-10-24 LAB
ALBUMIN SERPL-MCNC: 3.7 G/DL (ref 3.5–5)
ALBUMIN/GLOB SERPL: 1 {RATIO} (ref 1–2.1)
ALT SERPL-CCNC: 42 U/L (ref 9–52)
AST SERPL-CCNC: 42 U/L (ref 14–36)
BASOPHILS # BLD AUTO: 0 K/UL (ref 0–0.2)
BASOPHILS NFR BLD: 0.4 % (ref 0–2)
BUN SERPL-MCNC: 12 MG/DL (ref 7–17)
CALCIUM SERPL-MCNC: 9.3 MG/DL (ref 8.4–10.2)
EOSINOPHIL # BLD AUTO: 0.3 K/UL (ref 0–0.7)
EOSINOPHIL NFR BLD: 4 % (ref 0–4)
ERYTHROCYTE [DISTWIDTH] IN BLOOD BY AUTOMATED COUNT: 15.1 % (ref 11.5–14.5)
GFR NON-AFRICAN AMERICAN: > 60
HGB BLD-MCNC: 13.2 G/DL (ref 12–16)
LYMPHOCYTES # BLD AUTO: 1.3 K/UL (ref 1–4.3)
LYMPHOCYTES NFR BLD AUTO: 17.8 % (ref 20–40)
MCH RBC QN AUTO: 27.5 PG (ref 27–31)
MCHC RBC AUTO-ENTMCNC: 32.6 G/DL (ref 33–37)
MCV RBC AUTO: 84.3 FL (ref 81–99)
MONOCYTES # BLD: 1.1 K/UL (ref 0–0.8)
MONOCYTES NFR BLD: 14.9 % (ref 0–10)
NEUTROPHILS # BLD: 4.6 K/UL (ref 1.8–7)
NEUTROPHILS NFR BLD AUTO: 62.9 % (ref 50–75)
NRBC BLD AUTO-RTO: 0 % (ref 0–0)
PLATELET # BLD: 218 K/UL (ref 130–400)
PMV BLD AUTO: 9.2 FL (ref 7.2–11.7)
RBC # BLD AUTO: 4.81 MIL/UL (ref 3.8–5.2)
WBC # BLD AUTO: 7.4 K/UL (ref 4.8–10.8)

## 2018-10-24 RX ADMIN — NYSTATIN SCH APPLIC: 100000 OINTMENT TOPICAL at 13:20

## 2018-10-24 RX ADMIN — INSULIN LISPRO SCH: 100 INJECTION, SOLUTION INTRAVENOUS; SUBCUTANEOUS at 23:14

## 2018-10-24 RX ADMIN — ENOXAPARIN SODIUM SCH MG: 40 INJECTION SUBCUTANEOUS at 08:51

## 2018-10-24 RX ADMIN — DEXTROSE MONOHYDRATE, SODIUM CHLORIDE, AND POTASSIUM CHLORIDE SCH MLS/HR: 50; 9; 1.49 INJECTION, SOLUTION INTRAVENOUS at 08:54

## 2018-10-24 RX ADMIN — INSULIN LISPRO SCH UNITS: 100 INJECTION, SOLUTION INTRAVENOUS; SUBCUTANEOUS at 13:19

## 2018-10-24 RX ADMIN — NYSTATIN SCH APPLIC: 100000 OINTMENT TOPICAL at 08:51

## 2018-10-24 RX ADMIN — NYSTATIN SCH APPLIC: 100000 OINTMENT TOPICAL at 17:03

## 2018-10-24 RX ADMIN — INSULIN LISPRO SCH: 100 INJECTION, SOLUTION INTRAVENOUS; SUBCUTANEOUS at 08:28

## 2018-10-24 RX ADMIN — INSULIN LISPRO SCH: 100 INJECTION, SOLUTION INTRAVENOUS; SUBCUTANEOUS at 17:02

## 2018-10-24 NOTE — CP.PCM.PN
Subjective





- Date & Time of Evaluation


Date of Evaluation: 10/24/18


Time of Evaluation: 12:00





- Subjective


Subjective: 





Surgery: Dr. Meehan





Pt seen and examined. Pt states that early this morning her NGT fell out. She 

hasn't had any episodes of vomiting since then. She continues to have flatus but

denies BM. Her abdominal pain is well controlled with the pain medication. 

Ambulating around the hallway. Denies other complaints at this time. 





Objective





- Vital Signs/Intake and Output


Vital Signs (last 24 hours): 


                                        











Temp Pulse Resp BP Pulse Ox


 


 98.2 F   100 H  20   103/69   100 


 


 10/24/18 08:29  10/24/18 08:29  10/24/18 08:29  10/24/18 08:29  10/24/18 08:29











- Medications


Medications: 


                               Current Medications





Benzocaine/Menthol (Cepacol Sore Throat)  1 judith PO Q2 PRN


   PRN Reason: Sore Throat


   Last Admin: 10/23/18 15:31 Dose:  1 judith


Dextrose (Dextrose 50% Inj)  0 ml IV STAT PRN; Protocol


   PRN Reason: Hypoglycemia Protocol


Dextrose (Glutose 15)  0 gm PO ONCE PRN; Protocol


   PRN Reason: Hypoglycemia Protocol


Enoxaparin Sodium (Lovenox)  40 mg SC DAILY [facility]; Protocol


   Last Admin: 10/24/18 08:51 Dose:  40 mg


Famotidine (Pepcid)  20 mg IVP Q12 [facility]


   Last Admin: 10/24/18 09:56 Dose:  20 mg


Glucagon (Glucagen Diagnostic Kit)  0 mg IM STAT PRN; Protocol


   PRN Reason: Hypoglycemia Protocol


Hydromorphone HCl (Dilaudid)  1 mg IVP Q3 PRN


   PRN Reason: Pain, severe (8-10)


   Last Admin: 10/24/18 09:54 Dose:  1 mg


Chromium/Copper/Manganese/Zinc 3 ml/ Multivitamins/Vitamin C 10 ml/ Amino 

Acids/Electrolytes/Dextrose  1,013 mls @ 80 mls/hr IV .[id number] [facility]


   Last Admin: 10/22/18 16:44 Dose:  80 mls/hr


Amino Acids/Electrolytes/Dextrose (Clinimix 5/20 % "E" (1000 Ml))  1,000 mls @ 

80 mls/hr IV .[id number] [facility]


   Last Admin: 10/23/18 03:22 Dose:  80 mls/hr


Potassium Chloride/Dextrose/Sod Cl (Potassium Chl 20 Meq In D5-Ns)  1,000 mls @ 

50 mls/hr IV .Q20H [facility]


   Last Admin: 10/24/18 08:54 Dose:  50 mls/hr


Chromium/Copper/Manganese/Zinc 3 ml/ Multivitamins/Vitamin C 10 ml/ Amino 

Acids/Electrolytes/Dextrose  1,013 mls @ 80 mls/hr IV .[id number] [facility]


   Last Admin: 10/23/18 18:01 Dose:  80 mls/hr


Amino Acids/Electrolytes/Dextrose (Clinimix 5/20 % "E" (1000 Ml))  1,000 mls @ 

80 mls/hr IV .[id number] [facility]


   Last Admin: 10/24/18 08:42 Dose:  80 mls/hr


Insulin Human Lispro (Humalog)  0 units SC ACCU-CHECK [facility]; Protocol


   Last Admin: 10/24/18 08:28 Dose:  Not Given


Lidocaine (Lidoderm)  1 ea TD DAILY [facility]


   Last Admin: 10/24/18 08:51 Dose:  1 ea


Lidocaine HCl (Lidocaine 2% Viscous)  15 ml PO Q8 PRN


   PRN Reason: Sore Throat


Lorazepam (Ativan)  0.5 mg IVP Q6 PRN


   PRN Reason: Agitation


   Last Admin: 10/24/18 09:55 Dose:  0.5 mg


Nystatin (Mycostatin Oint)  1 applic TOP TID [facility]


   Last Admin: 10/24/18 08:51 Dose:  1 applic


Ondansetron HCl (Zofran Inj)  2 mg IVP Q6 PRN


   PRN Reason: Nausea/Vomiting


   Last Admin: 10/24/18 03:00 Dose:  2 mg











- Labs


Labs: 


                                        





                                 10/24/18 05:55 





                                 10/24/18 05:55 





                                        











PT  13.8 Seconds (9.8-13.1)  H  10/12/18  18:39    


 


INR  1.2   10/12/18  18:39    


 


APTT  30.1 Seconds (25.6-37.1)   10/12/18  18:39    














- Constitutional


Appears: Well, No Acute Distress





- Head Exam


Head Exam: ATRAUMATIC, NORMOCEPHALIC





- Eye Exam


Eye Exam: Normal appearance





- ENT Exam


ENT Exam: Mucous Membranes Moist





- Respiratory Exam


Respiratory Exam: NORMAL BREATHING PATTERN





- Cardiovascular Exam


Cardiovascular Exam: RRR





- GI/Abdominal Exam


GI & Abdominal Exam: Soft.  absent: Distended, Tenderness, Rebound





- Extremities Exam


Extremities Exam: absent: Calf Tenderness





- Neurological Exam


Neurological Exam: Alert, Awake, Oriented x3





- Skin


Skin Exam: Dry, Warm





Assessment and Plan





- Assessment and Plan (Free Text)


Assessment: 





48F with partial small bowel obstruction 


Plan: 





- cont to monitor


- will need NGT if starts to vomit again


- keep NPO


- tentative plan for OR tomorrow, if pt does not improve


- d/w Dr. Zoran Koroma

## 2018-10-24 NOTE — CP.PCM.PN
Addendum entered and electronically signed by Ariadna Cortes MD  10/24/18 11:51: 


Patient seen bedside . All chart and clinical data reviewed.  Agree with 

resident assessment and plan.


NGT removed this AM . Patient still with nausea and passing flatus , no BM 


Complains of some abdominal pain but better today


Keep NPO and continue TPN


Surgery on board following . Scheduled for  OR in AM 


Continue current management 











Original Note:








Subjective





- Date & Time of Evaluation


Date of Evaluation: 10/24/18


Time of Evaluation: 09:15





- Subjective


Subjective: 





Overnight NGT was removed by the patient. Examined by bedside, took a bath this 

AM. Continues to have pain but worse at certain times then others, better this 

AM. Frustrated with her medical problems, but hopeful. No BMs. 





Objective





- Vital Signs/Intake and Output


Vital Signs (last 24 hours): 


                                        











Temp Pulse Resp BP Pulse Ox


 


 98.3 F   100 H  20   102/71   100 


 


 10/24/18 01:14  10/24/18 01:14  10/24/18 01:14  10/23/18 16:47  10/24/18 01:14








Intake and Output: 


                                        











 10/23/18 10/24/18





 18:59 06:59


 


Intake Total 1560 


 


Output Total 1400 


 


Balance 160 














- Medications


Medications: 


                               Current Medications





Benzocaine/Menthol (Cepacol Sore Throat)  1 judith PO Q2 PRN


   PRN Reason: Sore Throat


   Last Admin: 10/23/18 15:31 Dose:  1 judith


Dextrose (Dextrose 50% Inj)  0 ml IV STAT PRN; Protocol


   PRN Reason: Hypoglycemia Protocol


Dextrose (Glutose 15)  0 gm PO ONCE PRN; Protocol


   PRN Reason: Hypoglycemia Protocol


Enoxaparin Sodium (Lovenox)  40 mg SC DAILY [facility]; Protocol


   Last Admin: 10/23/18 10:42 Dose:  40 mg


Famotidine (Pepcid)  20 mg IVP Q12 [facility]


   Last Admin: 10/23/18 21:09 Dose:  20 mg


Glucagon (Glucagen Diagnostic Kit)  0 mg IM STAT PRN; Protocol


   PRN Reason: Hypoglycemia Protocol


Hydromorphone HCl (Dilaudid)  1 mg IVP Q3 PRN


   PRN Reason: Pain, severe (8-10)


   Last Admin: 10/24/18 06:30 Dose:  1 mg


Chromium/Copper/Manganese/Zinc 3 ml/ Multivitamins/Vitamin C 10 ml/ Amino 

Acids/Electrolytes/Dextrose  1,013 mls @ 80 mls/hr IV .[id number] [facility]


   Last Admin: 10/22/18 16:44 Dose:  80 mls/hr


Amino Acids/Electrolytes/Dextrose (Clinimix 5/20 % "E" (1000 Ml))  1,000 mls @ 

80 mls/hr IV .A97Q06U [facility]


   Last Admin: 10/23/18 03:22 Dose:  80 mls/hr


Potassium Chloride/Dextrose/Sod Cl (Potassium Chl 20 Meq In D5-Ns)  1,000 mls @ 

50 mls/hr IV .Q20H [facility]


   Last Admin: 10/23/18 18:03 Dose:  50 mls/hr


Chromium/Copper/Manganese/Zinc 3 ml/ Multivitamins/Vitamin C 10 ml/ Amino 

Acids/Electrolytes/Dextrose  1,013 mls @ 80 mls/hr IV .[id number] [facility]


   Last Admin: 10/23/18 18:01 Dose:  80 mls/hr


Amino Acids/Electrolytes/Dextrose (Clinimix 5/20 % "E" (1000 Ml))  1,000 mls @ 

80 mls/hr IV .[id number] [facility]


Insulin Human Lispro (Humalog)  0 units SC ACCU-CHECK [facility]; Protocol


   Last Admin: 10/23/18 23:11 Dose:  Not Given


Lidocaine (Lidoderm)  1 ea TD DAILY [facility]


   Last Admin: 10/23/18 10:41 Dose:  1 ea


Lidocaine HCl (Lidocaine 2% Viscous)  15 ml PO Q8 PRN


   PRN Reason: Sore Throat


Lorazepam (Ativan)  0.5 mg IVP Q6 PRN


   PRN Reason: Agitation


   Last Admin: 10/24/18 03:24 Dose:  0.5 mg


Nystatin (Mycostatin Oint)  1 applic TOP TID [facility]


   Last Admin: 10/23/18 18:01 Dose:  1 applic


Ondansetron HCl (Zofran Inj)  2 mg IVP Q6 PRN


   PRN Reason: Nausea/Vomiting


   Last Admin: 10/24/18 03:00 Dose:  2 mg











- Labs


Labs: 


                                        





                                 10/24/18 05:55 





                                 10/23/18 06:10 





                                        











PT  13.8 Seconds (9.8-13.1)  H  10/12/18  18:39    


 


INR  1.2   10/12/18  18:39    


 


APTT  30.1 Seconds (25.6-37.1)   10/12/18  18:39    














- Constitutional


Appears: No Acute Distress





- Head Exam


Head Exam: NORMAL INSPECTION





- Eye Exam


Eye Exam: EOMI, Normal appearance





- ENT Exam


ENT Exam: Mucous Membranes Moist





- Respiratory Exam


Respiratory Exam: Clear to Ausculation Bilateral, NORMAL BREATHING PATTERN.  

absent: Wheezes





- Cardiovascular Exam


Cardiovascular Exam: REGULAR RHYTHM





- GI/Abdominal Exam


GI & Abdominal Exam: Soft, Normal Bowel Sounds.  absent: Tenderness





- Extremities Exam


Extremities Exam: Normal Inspection





- Neurological Exam


Neurological Exam: Alert, Awake





Assessment and Plan





- Assessment and Plan (Free Text)


Assessment: 





Assessment/Plan:


47 YO Female with PMHx of HTN, chronic back/abdominal pain with multiple nerves 

blocks (pain management as out patient), Colon cancer w/ metastasis to liver (on

chemotherapy), Depression, Fibromyalgia, GERD admitted for SBO.


Discussion with patient about transfer to [facility] for evaluation by 

her physicians, but patient refused at this time. She would like to stay in [facility]

for further care and management. 


Per Surgery OR Thursday, if no improvement of SBO.





Small Bowel Obstruction possibly due to internal hernia


- Surgery consult, Dr. Velarde/Dr. Meehan, recommendations appreciated: OR 

Thursday if no resolution 


- cont NPO, TPN, NGT, cont IVF


- C/w IVF, Pain control: Hydromorphone, Zofran and famotidine 


- CT abd and pelvis incomplete mechanical obstruction.


 


Chronic back pain 


- 2/2 metastatic colon Ca


- Chronic, H/O multiple lumbar surgeries. 


- Lidoderm patch for back pain


- Pain management consult, Dr. Winn, recommendations appreciated; Ativan for 

anxiety, for synergistic effect with opioid


- C/w pain management: Hydromorphine 1.5mg





NIDDM


- Controlled 


- Last A1c 7.7 on 5/28/18


- Low dose sliding scale


- Hold metformin 


- Hypoglycemia protocol 





HTN


- Controlled


- Hold Amlodipine 10 mg QD


- Monitor vitals





Skin Rash


-improving 


-likely candida skin infection 


-nystatin TOP TID





Sore throat


-2/2 to NGT


-cephacol and Lidocaine PO





Fibromyalgia


- Controlled


- Hold Gabapentin 300 mg TID


- Hold Duloxetine 60 mg PO HS





DVT


-lovenox sc


-will hold lovenox 10/25 for possible surgery

## 2018-10-24 NOTE — CP.PCM.PN
Subjective





- Date & Time of Evaluation


Date of Evaluation: 10/24/18


Time of Evaluation: 10:00





- Subjective


Subjective: 


Chart reviewed.  Patient is still not able to tolerate PO, NGT when in place 

still has large output, and repeat CT scan still shows SBO.  There is a plan for

surgical intervention tomorrow.  





Pain medication has been weaned, and patient is trying her best to tolerate it.





Objective





- Vital Signs/Intake and Output


Vital Signs (last 24 hours): 


                                        











Temp Pulse Resp BP Pulse Ox


 


 98.2 F   100 H  20   103/69   100 


 


 10/24/18 08:29  10/24/18 08:29  10/24/18 08:29  10/24/18 08:29  10/24/18 08:29











- Medications


Medications: 


                               Current Medications





Benzocaine/Menthol (Cepacol Sore Throat)  1 judith PO Q2 PRN


   PRN Reason: Sore Throat


   Last Admin: 10/23/18 15:31 Dose:  1 judith


Dextrose (Dextrose 50% Inj)  0 ml IV STAT PRN; Protocol


   PRN Reason: Hypoglycemia Protocol


Dextrose (Glutose 15)  0 gm PO ONCE PRN; Protocol


   PRN Reason: Hypoglycemia Protocol


Enoxaparin Sodium (Lovenox)  40 mg SC DAILY [facility]; Protocol


   Last Admin: 10/24/18 08:51 Dose:  40 mg


Famotidine (Pepcid)  20 [facility] IVP Q[facility]


   Last Admin: 10/24/18 09:56 Dose:  20 mg


Glucagon (Glucagen Diagnostic Kit)  0 mg IM STAT PRN; Protocol


   PRN Reason: Hypoglycemia Protocol


Hydromorphone HCl (Dilaudid)  1 mg IVP Q3 PRN


   PRN Reason: Pain, severe (8-10)


   Last Admin: 10/24/18 09:54 Dose:  1 mg


Chromium/Copper/Manganese/Zinc 3 ml/ Multivitamins/Vitamin C 10 ml/ Amino 

Acids/Electrolytes/Dextrose  1,013 mls @ 80 mls/hr IV [facility][id number] [facility]


   Last Admin: 10/22/18 16:44 Dose:  80 mls/hr


Amino Acids/Electrolytes/Dextrose (Clinimix 5/20 % "E" (1000 Ml))  1,000 mls @ 

80 mls/hr IV .[id number] [facility]


   Last Admin: 10/23/18 03:22 Dose:  80 mls/hr


Potassium Chloride/Dextrose/Sod Cl (Potassium Chl 20 Meq In D5-Ns)  1,000 mls @ 

50 mls/hr IV .Q20H [facility]


   Last Admin: 10/24/18 08:54 Dose:  50 mls/hr


Chromium/Copper/Manganese/Zinc 3 ml/ Multivitamins/Vitamin C 10 ml/ Amino 

Acids/Electrolytes/Dextrose  1,013 mls @ 80 mls/hr IV .[id number] [facility]


   Last Admin: 10/23/18 18:01 Dose:  80 mls/hr


Amino Acids/Electrolytes/Dextrose (Clinimix 5/20 % "E" (1000 Ml))  1,000 mls @ 

80 mls/hr IV .[id number] [facility]


   Last Admin: 10/24/18 08:42 Dose:  80 mls/hr


Insulin Human Lispro (Humalog)  0 units SC ACCU-CHECK [facility]; Protocol


   Last Admin: 10/24/18 08:28 Dose:  Not Given


Lidocaine (Lidoderm)  1 ea TD DAILY [facility]


   Last Admin: 10/24/18 08:51 Dose:  1 ea


Lidocaine HCl (Lidocaine 2% Viscous)  15 ml PO Q8 PRN


   PRN Reason: Sore Throat


Lorazepam (Ativan)  0.5 mg IVP Q6 PRN


   PRN Reason: Agitation


   Last Admin: 10/24/18 09:55 Dose:  0.5 mg


Nystatin (Mycostatin Oint)  1 applic TOP TID [facility]


   Last Admin: 10/24/18 08:51 Dose:  1 applic


Ondansetron HCl (Zofran Inj)  2 mg IVP Q6 PRN


   PRN Reason: Nausea/Vomiting


   Last Admin: 10/24/18 03:00 Dose:  2 mg











- Labs


Labs: 


                                        





                                 10/24/18 05:55 





                                 10/24/18 05:55 





                                        











PT  13.8 Seconds (9.8-13.1)  H  10/12/18  18:39    


 


INR  1.2   10/12/18  18:39    


 


APTT  30.1 Seconds (25.6-37.1)   10/12/18  18:39    














Assessment and Plan


(1) Abdominal pain


Assessment & Plan: 


47 yo w/ chronic pain and SBO.  If ex-lap is planned, would recommend epidural +

GA for anesthesia.





- care per surgical team


Status: Acute

## 2018-10-25 LAB
ALBUMIN SERPL-MCNC: 3.3 G/DL (ref 3.5–5)
ALBUMIN/GLOB SERPL: 0.9 {RATIO} (ref 1–2.1)
ALT SERPL-CCNC: 38 U/L (ref 9–52)
APTT BLD: 29.5 SECONDS (ref 25.6–37.1)
AST SERPL-CCNC: 30 U/L (ref 14–36)
BUN SERPL-MCNC: 9 MG/DL (ref 7–17)
CALCIUM SERPL-MCNC: 8.8 MG/DL (ref 8.4–10.2)
ERYTHROCYTE [DISTWIDTH] IN BLOOD BY AUTOMATED COUNT: 15.4 % (ref 11.5–14.5)
GFR NON-AFRICAN AMERICAN: > 60
HGB BLD-MCNC: 12.2 G/DL (ref 12–16)
INR PPP: 1.3
MCH RBC QN AUTO: 27.6 PG (ref 27–31)
MCHC RBC AUTO-ENTMCNC: 33 G/DL (ref 33–37)
MCV RBC AUTO: 83.5 FL (ref 81–99)
PLATELET # BLD: 198 K/UL (ref 130–400)
PROTHROMBIN TIME: 14.5 SECONDS (ref 9.8–13.1)
RBC # BLD AUTO: 4.43 MIL/UL (ref 3.8–5.2)
WBC # BLD AUTO: 6.7 K/UL (ref 4.8–10.8)

## 2018-10-25 RX ADMIN — DEXTROSE MONOHYDRATE, SODIUM CHLORIDE, AND POTASSIUM CHLORIDE SCH: 50; 9; 1.49 INJECTION, SOLUTION INTRAVENOUS at 03:00

## 2018-10-25 RX ADMIN — NYSTATIN SCH APPLIC: 100000 OINTMENT TOPICAL at 16:35

## 2018-10-25 RX ADMIN — INSULIN LISPRO SCH UNITS: 100 INJECTION, SOLUTION INTRAVENOUS; SUBCUTANEOUS at 09:06

## 2018-10-25 RX ADMIN — INSULIN LISPRO SCH: 100 INJECTION, SOLUTION INTRAVENOUS; SUBCUTANEOUS at 17:14

## 2018-10-25 RX ADMIN — DEXTROSE MONOHYDRATE, SODIUM CHLORIDE, AND POTASSIUM CHLORIDE SCH MLS/HR: 50; 9; 1.49 INJECTION, SOLUTION INTRAVENOUS at 11:54

## 2018-10-25 RX ADMIN — NYSTATIN SCH APPLIC: 100000 OINTMENT TOPICAL at 14:51

## 2018-10-25 RX ADMIN — NYSTATIN SCH APPLIC: 100000 OINTMENT TOPICAL at 09:03

## 2018-10-25 RX ADMIN — INSULIN LISPRO SCH: 100 INJECTION, SOLUTION INTRAVENOUS; SUBCUTANEOUS at 12:51

## 2018-10-25 NOTE — CP.PCM.PN
<Tamara Klein - Last Filed: 10/25/18 11:05>





Subjective





- Date & Time of Evaluation


Date of Evaluation: 10/25/18


Time of Evaluation: 07:40





- Subjective


Subjective: 





No acute overnight events. Pt had BM last night, passing has. No nausea or 

vomiting after NGT came out. Abdominal pain better. 


Tentative OR today. 





Objective





- Vital Signs/Intake and Output


Vital Signs (last 24 hours): 


                                        











Temp Pulse Resp BP Pulse Ox


 


 97.9 F   96 H  20   119/73   100 


 


 10/25/18 00:25  10/25/18 00:25  10/25/18 00:25  10/25/18 00:25  10/25/18 00:25











- Medications


Medications: 


                               Current Medications





Benzocaine/Menthol (Cepacol Sore Throat)  1 judith PO Q2 PRN


   PRN Reason: Sore Throat


   Last Admin: 10/23/18 15:31 Dose:  1 judith


Dextrose (Dextrose 50% Inj)  0 ml IV STAT PRN; Protocol


   PRN Reason: Hypoglycemia Protocol


Dextrose (Glutose 15)  0 gm PO ONCE PRN; Protocol


   PRN Reason: Hypoglycemia Protocol


Enoxaparin Sodium (Lovenox)  40 mg SC DAILY JAM; Protocol


   Last Admin: 10/24/18 08:51 Dose:  40 mg


Famotidine (Pepcid)  20 mg IVP Q12 J[facility]


   Last Admin: 10/24/18 22:16 Dose:  20 mg


Glucagon (Glucagen Diagnostic Kit)  0 mg IM STAT PRN; Protocol


   PRN Reason: Hypoglycemia Protocol


Hydromorphone HCl (Dilaudid)  1 mg IVP Q3 PRN


   PRN Reason: Pain, severe (8-10)


   Last Admin: 10/25/18 06:57 Dose:  1 mg


Chromium/Copper/Manganese/Zinc 3 ml/ Multivitamins/Vitamin C 10 ml/ Amino 

Acids/Electrolytes/Dextrose  1,013 mls @ 80 mls/hr IV .[id number] ONE


   Stop: 10/25/18 08:39


Potassium Chloride/Dextrose/Sod Cl (Potassium Chl 20 Meq In D5-Ns)  1,000 mls @ 

100 mls/hr IV .Q10H JAM


   Last Admin: 10/25/18 03:00 Dose:  Not Given


Insulin Human Lispro (Humalog)  0 units SC ACCU-CHECK JAM; Protocol


   Last Admin: 10/24/18 23:14 Dose:  Not Given


Lidocaine (Lidoderm)  1 ea TD DAILY [facility]


   Last Admin: 10/24/18 08:51 Dose:  1 ea


Lidocaine HCl (Lidocaine 2% Viscous)  15 ml PO Q8 PRN


   PRN Reason: Sore Throat


Lorazepam (Ativan)  0.5 mg IVP Q6 PRN


   PRN Reason: Agitation


   Last Admin: 10/25/18 05:16 Dose:  0.5 mg


Nystatin (Mycostatin Oint)  1 applic TOP TID [facility]


   Last Admin: 10/24/18 17:03 Dose:  1 applic


Ondansetron HCl (Zofran Inj)  2 mg IVP Q6 PRN


   PRN Reason: Nausea/Vomiting


   Last Admin: 10/24/18 03:00 Dose:  2 mg











- Labs


Labs: 


                                        





                                 10/25/18 05:55 





                                 10/25/18 05:55 





                                        











PT  14.5 Seconds (9.8-13.1)  H  10/25/18  05:55    


 


INR  1.3   10/25/18  05:55    


 


APTT  29.5 Seconds (25.6-37.1)   10/25/18  05:55    














- Constitutional


Appears: No Acute Distress





- Head Exam


Head Exam: NORMAL INSPECTION





- Eye Exam


Eye Exam: EOMI, Normal appearance





- ENT Exam


ENT Exam: Mucous Membranes Moist





- Respiratory Exam


Respiratory Exam: Clear to Ausculation Bilateral, NORMAL BREATHING PATTERN.  

absent: Wheezes





- Cardiovascular Exam


Cardiovascular Exam: REGULAR RHYTHM, +S1, +S2





- GI/Abdominal Exam


GI & Abdominal Exam: Soft, Hyperactive Bowel Sounds.  absent: Tenderness


Additional comments: 





multiple surgical scars in the abdomen, old well healed





- Extremities Exam


Extremities Exam: Normal Inspection.  absent: Calf Tenderness, Pedal Edema





- Neurological Exam


Neurological Exam: Alert, Awake





- Psychiatric Exam


Psychiatric exam: Normal Mood





Assessment and Plan





- Assessment and Plan (Free Text)


Assessment: 





Assessment/Plan:


47 YO Female with PMHx of HTN, chronic back/abdominal pain with multiple nerves 

blocks (pain management as out patient), Colon cancer w/ metastasis to liver (on

chemotherapy), Depression, Fibromyalgia, GERD admitted for SBO.


Discussion with patient about transfer to [facility] for evaluation by 

her physicians, but patient refused at this time. She would like to stay in [facility]

for further care and management. 


Per Surgery OR Today, if no improvement of SBO VS conservative management with 

CLD





Small Bowel Obstruction possibly due to internal hernia


- Surgery consult, Dr. Velarde/Dr. Meehan, recommendations appreciated: OR 

Thursday if no resolution 


- cont NPO, TPN, NGT, cont IVF


- C/w IVF, Pain control: Hydromorphone, Zofran and famotidine 


- CT abd and pelvis incomplete mechanical obstruction.


 


Chronic back pain 


- 2/2 metastatic colon Ca


- Chronic, H/O multiple lumbar surgeries. 


- Lidoderm patch for back pain


- Pain management consult, Dr. Winn, recommendations appreciated; Ativan for 

anxiety, for synergistic effect with opioid


- C/w pain management: Hydromorphine 1.5mg





NIDDM


- Controlled 


- Last A1c 7.7 on 5/28/18


- Low dose sliding scale


- Hold metformin 


- Hypoglycemia protocol 





HTN


- Controlled


- Hold Amlodipine 10 mg QD


- Monitor vitals





Skin Rash


- improving 


- likely candida skin infection 


- nystatin TOP TID





Sore throat


- resolved


- d/c viscus lidocaine 





Fibromyalgia


- Controlled


- Hold Gabapentin 300 mg TID


- Hold Duloxetine 60 mg PO HS





DVT


- lovenox sc


- will hold lovenox 10/25 for possible surgery will resume if no surgery  





<Wilbert Roy - Last Filed: 10/25/18 17:10>





Objective





- Vital Signs/Intake and Output


Vital Signs (last 24 hours): 


                                        











Temp Pulse Resp BP Pulse Ox


 


 98.3 F   109 H  18   106/74   98 


 


 10/25/18 16:31  10/25/18 16:31  10/25/18 16:31  10/25/18 16:31  10/25/18 16:31











- Medications


Medications: 


                               Current Medications





Benzocaine/Menthol (Cepacol Sore Throat)  1 judith PO Q2 PRN


   PRN Reason: Sore Throat


   Last Admin: 10/23/18 15:31 Dose:  1 judith


Dextrose (Dextrose 50% Inj)  0 ml IV STAT PRN; Protocol


   PRN Reason: Hypoglycemia Protocol


Dextrose (Glutose 15)  0 gm PO ONCE PRN; Protocol


   PRN Reason: Hypoglycemia Protocol


Enoxaparin Sodium (Lovenox)  40 mg SC DAILY J[facility]; Protocol


   Last Admin: 10/24/18 08:51 Dose:  40 mg


Famotidine (Pepcid)  20 mg IVP Q12 [facility]


   Last Admin: 10/25/18 09:06 Dose:  20 mg


Fat Emulsion Intravenous (Intralipid 20%)  250 ml IV DAILY [facility]


   Last Admin: 10/25/18 17:09 Dose:  250 ml


Glucagon (Glucagen Diagnostic Kit)  0 mg IM STAT PRN; Protocol


   PRN Reason: Hypoglycemia Protocol


Hydromorphone HCl (Dilaudid)  1 mg IVP Q3 PRN


   PRN Reason: Pain, severe (8-10)


   Last Admin: 10/25/18 17:06 Dose:  1 mg


Potassium Chloride/Dextrose/Sod Cl (Potassium Chl 20 Meq In D5-Ns)  1,000 mls @ 

100 mls/hr IV .Q10H [facility]


   Last Admin: 10/25/18 11:54 Dose:  100 mls/hr


Multivitamins/Vitamin C 10 ml/Chromium/Copper/Manganese/Zinc 3 ml/ Amino 

Acids/Electrolytes/Dextrose  1,013 mls @ 80 mls/hr IV .[id number] ONE


   Stop: 10/26/18 04:54


Insulin Human Lispro (Humalog)  0 units SC ACCU-CHECK JAM; Protocol


   Last Admin: 10/25/18 12:51 Dose:  Not Given


Lidocaine (Lidoderm)  1 ea TD DAILY [facility]


   Last Admin: 10/25/18 09:05 Dose:  1 ea


Lorazepam (Ativan)  0.5 mg IVP Q6 PRN


   PRN Reason: Agitation


   Last Admin: 10/25/18 11:49 Dose:  0.5 mg


Nystatin (Mycostatin Oint)  1 applic TOP TID [facility]


   Last Admin: 10/25/18 16:35 Dose:  1 applic


Ondansetron HCl (Zofran Inj)  2 mg IVP Q6 PRN


   PRN Reason: Nausea/Vomiting


   Last Admin: 10/24/18 03:00 Dose:  2 mg











- Labs


Labs: 


                                        





                                 10/25/18 05:55 





                                 10/25/18 05:55 





                                        











PT  14.5 Seconds (9.8-13.1)  H  10/25/18  05:55    


 


INR  1.3   10/25/18  05:55    


 


APTT  29.5 Seconds (25.6-37.1)   10/25/18  05:55    














Attending/Attestation





- Attestation


I have personally seen and examined this patient.: Yes


I have fully participated in the care of the patient.: Yes


I have reviewed all pertinent clinical information, including history, physical 

exam and plan: Yes


Notes (Text): 





OR cancelled


trial of liq diet again per surgery

## 2018-10-25 NOTE — CP.PCM.PN
Subjective





- Date & Time of Evaluation


Date of Evaluation: 10/25/18


Time of Evaluation: 07:56





- Subjective


Subjective: 





Surgery: Dr. Meehan





Pt seen and examined. No acute overnight events. States she feels better this 

morning and hasn't had any more episodes of vomiting since the NGT came out 

yesterday morning. She admits to having a bowel movement overnight which was 

mostly liquid. Admits to flatus as well. Denies fevers/chills. 





Objective





- Vital Signs/Intake and Output


Vital Signs (last 24 hours): 


                                        











Temp Pulse Resp BP Pulse Ox


 


 97.9 F   96 H  20   119/73   100 


 


 10/25/18 00:25  10/25/18 00:25  10/25/18 00:25  10/25/18 00:25  10/25/18 00:25











- Medications


Medications: 


                               Current Medications





Benzocaine/Menthol (Cepacol Sore Throat)  1 judith PO Q2 PRN


   PRN Reason: Sore Throat


   Last Admin: 10/23/18 15:31 Dose:  1 judith


Dextrose (Dextrose 50% Inj)  0 ml IV STAT PRN; Protocol


   PRN Reason: Hypoglycemia Protocol


Dextrose (Glutose 15)  0 gm PO ONCE PRN; Protocol


   PRN Reason: Hypoglycemia Protocol


Enoxaparin Sodium (Lovenox)  40 mg SC DAILY J[facility]; Protocol


   Last Admin: 10/24/18 08:51 Dose:  40 mg


Famotidine (Pepcid)  20 mg IVP Q12 [facility]


   Last Admin: 10/24/18 22:16 Dose:  20 mg


Glucagon (Glucagen Diagnostic Kit)  0 mg IM STAT PRN; Protocol


   PRN Reason: Hypoglycemia Protocol


Hydromorphone HCl (Dilaudid)  1 mg IVP Q3 PRN


   PRN Reason: Pain, severe (8-10)


   Last Admin: 10/25/18 06:57 Dose:  1 mg


Chromium/Copper/Manganese/Zinc 3 ml/ Multivitamins/Vitamin C 10 ml/ Amino 

Acids/Electrolytes/Dextrose  1,013 mls @ 80 mls/hr IV .[id number] ONE


   Stop: 10/25/18 08:39


Potassium Chloride/Dextrose/Sod Cl (Potassium Chl 20 Meq In D5-Ns)  1,000 mls @ 

100 mls/hr IV .Q10H [facility]


   Last Admin: 10/25/18 03:00 Dose:  Not Given


Insulin Human Lispro (Humalog)  0 units SC ACCU-CHECK JAM; Protocol


   Last Admin: 10/24/18 23:14 Dose:  Not Given


Lidocaine (Lidoderm)  1 ea TD DAILY [facility]


   Last Admin: 10/24/18 08:51 Dose:  1 ea


Lidocaine HCl (Lidocaine 2% Viscous)  15 ml PO Q8 PRN


   PRN Reason: Sore Throat


Lorazepam (Ativan)  0.5 mg IVP Q6 PRN


   PRN Reason: Agitation


   Last Admin: 10/25/18 05:16 Dose:  0.5 mg


Nystatin (Mycostatin Oint)  1 applic TOP TID [facility]


   Last Admin: 10/24/18 17:03 Dose:  1 applic


Ondansetron HCl (Zofran Inj)  2 mg IVP Q6 PRN


   PRN Reason: Nausea/Vomiting


   Last Admin: 10/24/18 03:00 Dose:  2 mg











- Labs


Labs: 


                                        





                                 10/25/18 05:55 





                                 10/25/18 05:55 





                                        











PT  14.5 Seconds (9.8-13.1)  H  10/25/18  05:55    


 


INR  1.3   10/25/18  05:55    


 


APTT  29.5 Seconds (25.6-37.1)   10/25/18  05:55    














- Constitutional


Appears: Well, No Acute Distress





- Head Exam


Head Exam: ATRAUMATIC, NORMOCEPHALIC





- ENT Exam


ENT Exam: Mucous Membranes Moist





- Respiratory Exam


Respiratory Exam: NORMAL BREATHING PATTERN





- Cardiovascular Exam


Cardiovascular Exam: RRR





- GI/Abdominal Exam


GI & Abdominal Exam: Soft.  absent: Distended, Guarding, Tenderness, Rebound





- Neurological Exam


Neurological Exam: Alert, Awake, Oriented x3





- Skin


Skin Exam: Dry, Warm





Assessment and Plan





- Assessment and Plan (Free Text)


Assessment: 





48F with partial small bowel obstruction 


Plan: 





- pt with improving symptoms this AM & BM overnight


- will hold off on OR for now and start a trial of CLD


- monitor bowel function


- cont to encourage ambulation





Ga

## 2018-10-26 LAB
BUN SERPL-MCNC: 5 MG/DL (ref 7–17)
CALCIUM SERPL-MCNC: 8.9 MG/DL (ref 8.4–10.2)
GFR NON-AFRICAN AMERICAN: > 60

## 2018-10-26 RX ADMIN — NYSTATIN SCH APPLIC: 100000 OINTMENT TOPICAL at 19:45

## 2018-10-26 RX ADMIN — INSULIN LISPRO SCH: 100 INJECTION, SOLUTION INTRAVENOUS; SUBCUTANEOUS at 19:40

## 2018-10-26 RX ADMIN — DEXTROSE MONOHYDRATE, SODIUM CHLORIDE, AND POTASSIUM CHLORIDE SCH: 50; 9; 1.49 INJECTION, SOLUTION INTRAVENOUS at 19:46

## 2018-10-26 RX ADMIN — INSULIN LISPRO SCH: 100 INJECTION, SOLUTION INTRAVENOUS; SUBCUTANEOUS at 22:32

## 2018-10-26 RX ADMIN — INSULIN LISPRO SCH: 100 INJECTION, SOLUTION INTRAVENOUS; SUBCUTANEOUS at 13:27

## 2018-10-26 RX ADMIN — ENOXAPARIN SODIUM SCH MG: 40 INJECTION SUBCUTANEOUS at 09:36

## 2018-10-26 RX ADMIN — NYSTATIN SCH APPLIC: 100000 OINTMENT TOPICAL at 13:36

## 2018-10-26 RX ADMIN — INSULIN LISPRO SCH UNITS: 100 INJECTION, SOLUTION INTRAVENOUS; SUBCUTANEOUS at 09:42

## 2018-10-26 RX ADMIN — NYSTATIN SCH APPLIC: 100000 OINTMENT TOPICAL at 09:36

## 2018-10-26 RX ADMIN — DEXTROSE MONOHYDRATE, SODIUM CHLORIDE, AND POTASSIUM CHLORIDE SCH MLS/HR: 50; 9; 1.49 INJECTION, SOLUTION INTRAVENOUS at 09:37

## 2018-10-26 NOTE — CP.PCM.PN
<Maria Teresa Morales - Last Filed: 10/26/18 17:14>





Subjective





- Date & Time of Evaluation


Date of Evaluation: 10/26/18


Time of Evaluation: 09:00





- Subjective


Subjective: 


No acute overnight events. Pt states she has lower abdominal and back pain worse

since being advanced to full liquids, controlled with Dilaudid and ativan. Pt 

had a BM yesterday passing flatus, denies nausea, vomiting, diarrhea, 

constipation, chest pain, SOB.  








Objective





- Vital Signs/Intake and Output


Vital Signs (last 24 hours): 


                                        











Temp Pulse Resp BP Pulse Ox


 


 98.1 F   101 H  19   118/83   100 


 


 10/26/18 08:26  10/26/18 08:26  10/26/18 08:26  10/26/18 08:26  10/26/18 08:26











- Medications


Medications: 


                               Current Medications





Benzocaine/Menthol (Cepacol Sore Throat)  1 judith PO Q2 PRN


   PRN Reason: Sore Throat


   Last Admin: 10/23/18 15:31 Dose:  1 judith


Dextrose (Dextrose 50% Inj)  0 ml IV STAT PRN; Protocol


   PRN Reason: Hypoglycemia Protocol


Dextrose (Glutose 15)  0 gm PO ONCE PRN; Protocol


   PRN Reason: Hypoglycemia Protocol


Enoxaparin Sodium (Lovenox)  40 mg SC DAILY [facility]; Protocol


   Last Admin: 10/24/18 08:51 Dose:  40 mg


Famotidine (Pepcid)  20 mg IVP Q12 [facility]


   Last Admin: 10/25/18 21:04 Dose:  20 mg


Fat Emulsion Intravenous (Intralipid 20%)  250 ml IV DAILY [facility]


   Last Admin: 10/25/18 17:09 Dose:  250 ml


Glucagon (Glucagen Diagnostic Kit)  0 mg IM STAT PRN; Protocol


   PRN Reason: Hypoglycemia Protocol


Hydromorphone HCl (Dilaudid)  0.5 mg IVP Q3 PRN


   PRN Reason: Pain, severe (8-10)


   Last Admin: 10/26/18 08:25 Dose:  0.5 mg


Potassium Chloride/Dextrose/Sod Cl (Potassium Chl 20 Meq In D5-Ns)  1,000 mls @ 

100 mls/hr IV .Q10H [facility]


   Last Admin: 10/25/18 11:54 Dose:  100 mls/hr


Insulin Human Lispro (Humalog)  0 units SC ACCU-CHECK JAM; Protocol


   Last Admin: 10/25/18 17:14 Dose:  Not Given


Lidocaine (Lidoderm)  1 ea TD DAILY [facility]


   Last Admin: 10/25/18 09:05 Dose:  1 ea


Lorazepam (Ativan)  0.5 mg IVP Q6 PRN


   PRN Reason: Agitation


   Last Admin: 10/26/18 07:27 Dose:  0.5 mg


Nystatin (Mycostatin Oint)  1 applic TOP TID [facility]


   Last Admin: 10/25/18 16:35 Dose:  1 applic


Ondansetron HCl (Zofran Inj)  2 mg IVP Q6 PRN


   PRN Reason: Nausea/Vomiting


   Last Admin: 10/24/18 03:00 Dose:  2 mg











- Labs


Labs: 


                                        





                                 10/25/18 05:55 





                                 10/26/18 05:50 





                                        











PT  14.5 Seconds (9.8-13.1)  H  10/25/18  05:55    


 


INR  1.3   10/25/18  05:55    


 


APTT  29.5 Seconds (25.6-37.1)   10/25/18  05:55    














- Constitutional


Appears: Non-toxic, No Acute Distress





- Head Exam


Head Exam: ATRAUMATIC, NORMAL INSPECTION, NORMOCEPHALIC





- Eye Exam


Eye Exam: EOMI, Normal appearance





- ENT Exam


ENT Exam: Mucous Membranes Moist





- Respiratory Exam


Respiratory Exam: Clear to Ausculation Bilateral, NORMAL BREATHING PATTERN





- Cardiovascular Exam


Cardiovascular Exam: RRR, +S1, +S2





- GI/Abdominal Exam


GI & Abdominal Exam: Guarding, Soft, Tenderness (Diffuse), Normal Bowel Sounds


Additional comments: 


No rebound





- Extremities Exam


Extremities Exam: Normal Inspection





- Neurological Exam


Neurological Exam: Alert, Awake, Oriented x3





Assessment and Plan





- Assessment and Plan (Free Text)


Assessment: 


49 yo F with PMHx of HTN, chronic back/abdominal pain with multiple nerves 

blocks (pain management as out patient), Colon cancer w/ metastasis to liver (on

chemotherapy), Depression, Fibromyalgia, GERD admitted for SBO.


Discussion with patient about transfer to [facility] for evaluation by 

her physicians, but patient refused at this time. She would like to stay in [facility]

for further care and management. 


Per Surgery OR cancelled today,possible surgery if no improvement of SBO VS 

conservative management with CLD





Small Bowel Obstruction possibly due to internal hernia


- Surgery consult, Dr. Velarde/Dr. Meehan, recommendations appreciated: OR 

cancelled today


- Trial of liquid diet per surgery 


- TPN held, NGT out


- C/w IVF, Pain control: Hydromorphone, Zofran and famotidine 


- CT abd and pelvis incomplete mechanical obstruction.


 


Chronic back pain 


- 2/2 metastatic colon Ca


- Chronic, H/O multiple lumbar surgeries. 


- Lidoderm patch for back pain


- Pain management consult, Dr. Winn, recommendations appreciated; Ativan for 

anxiety, for synergistic effect with opioid


- C/w pain management: decreased to Hydromorphine 0.5mg





NIDDM


- Controlled 


- Last A1c 7.7 on 5/28/18


- Low dose sliding scale


- Hold metformin 


- Hypoglycemia protocol 





HTN


- Controlled


- Hold Amlodipine 10 mg QD


- Monitor vitals





Skin Rash


- improving 


- likely candida skin infection 


- nystatin TOP TID





Sore throat


- resolved


- d/c viscus lidocaine 





Fibromyalgia


- Controlled


- Gabapentin 400 mg Q8h


- Duloxetine 60 mg PO HS





DVT


- lovenox 40mg





Code status


-Full code 





<Heike Bruno - Last Filed: 10/26/18 17:46>





Objective





- Vital Signs/Intake and Output


Vital Signs (last 24 hours): 


                                        











Temp Pulse Resp BP Pulse Ox


 


 98.6 F   94 H  18   113/79   100 


 


 10/26/18 15:58  10/26/18 15:58  10/26/18 15:58  10/26/18 15:58  10/26/18 15:58











- Medications


Medications: 


                               Current Medications





Benzocaine/Menthol (Cepacol Sore Throat)  1 judith PO Q2 PRN


   PRN Reason: Sore Throat


   Last Admin: 10/23/18 15:31 Dose:  1 judith


Dextrose (Dextrose 50% Inj)  0 ml IV STAT PRN; Protocol


   PRN Reason: Hypoglycemia Protocol


Dextrose (Glutose 15)  0 gm PO ONCE PRN; Protocol


   PRN Reason: Hypoglycemia Protocol


Duloxetine HCl (Cymbalta)  60 mg PO HS JAM


Enoxaparin Sodium (Lovenox)  40 mg SC DAILY [facility]; Protocol


   Last Admin: 10/26/18 09:36 Dose:  40 mg


Famotidine (Pepcid)  20 mg PO Q12 JAM


Fat Emulsion Intravenous (Intralipid 20%)  250 ml IV DAILY [facility]


   Last Admin: 10/26/18 14:07 Dose:  Not Given


Gabapentin (Neurontin)  400 mg PO Q8 [facility]


   Last Admin: 10/26/18 14:09 Dose:  400 mg


Glucagon (Glucagen Diagnostic Kit)  0 mg IM STAT PRN; Protocol


   PRN Reason: Hypoglycemia Protocol


Hydromorphone HCl (Dilaudid)  0.5 mg IVP Q3 PRN


   PRN Reason: Pain, severe (8-10)


   Last Admin: 10/26/18 17:32 Dose:  0.5 mg


Potassium Chloride/Dextrose/Sod Cl (Potassium Chl 20 Meq In D5-Ns)  1,000 mls @ 

100 mls/hr IV .Q10H [facility]


   Last Admin: 10/26/18 09:37 Dose:  100 mls/hr


Insulin Human Lispro (Humalog)  0 units SC ACCU-CHECK JAM; Protocol


   Last Admin: 10/26/18 13:27 Dose:  Not Given


Lidocaine (Lidoderm)  1 ea TD DAILY [facility]


   Last Admin: 10/26/18 09:35 Dose:  1 ea


Nystatin (Mycostatin Oint)  1 applic TOP TID [facility]


   Last Admin: 10/26/18 13:36 Dose:  1 applic


Ondansetron HCl (Zofran Inj)  2 mg IVP Q6 PRN


   PRN Reason: Nausea/Vomiting


   Last Admin: 10/26/18 13:32 Dose:  2 mg











- Labs


Labs: 


                                        





                                 10/25/18 05:55 





                                 10/26/18 05:50 





                                        











PT  14.5 Seconds (9.8-13.1)  H  10/25/18  05:55    


 


INR  1.3   10/25/18  05:55    


 


APTT  29.5 Seconds (25.6-37.1)   10/25/18  05:55    














Attending/Attestation





- Attestation


I have personally seen and examined this patient.: Yes


I have fully participated in the care of the patient.: Yes


I have reviewed all pertinent clinical information, including history, physical 

exam and plan: Yes


Notes (Text): 





10/26/18 17:46


Agree with findings and plan as above. 


TAPERING BENZO AND IV OPIOIDS.

## 2018-10-26 NOTE — CP.PCM.PN
Subjective





- Date & Time of Evaluation


Date of Evaluation: 10/26/18


Time of Evaluation: 07:57





- Subjective


Subjective: 





Surgery: Dr. Meehan





Pt seen and examined. No acute overnight events. States she feels well this 

morning and hasn't had any episodes of vomiting. She's tolerating CLD and admits

to flatus. Pt admits to ambulating. Denies fevers/chills. 





Objective





- Vital Signs/Intake and Output


Vital Signs (last 24 hours): 


                                        











Temp Pulse Resp BP Pulse Ox


 


 98.0 F   110 H  18   115/79   99 


 


 10/25/18 23:41  10/25/18 23:41  10/25/18 23:41  10/25/18 23:41  10/25/18 23:41











- Medications


Medications: 


                               Current Medications





Benzocaine/Menthol (Cepacol Sore Throat)  1 judith PO Q2 PRN


   PRN Reason: Sore Throat


   Last Admin: 10/23/18 15:31 Dose:  1 judith


Dextrose (Dextrose 50% Inj)  0 ml IV STAT PRN; Protocol


   PRN Reason: Hypoglycemia Protocol


Dextrose (Glutose 15)  0 gm PO ONCE PRN; Protocol


   PRN Reason: Hypoglycemia Protocol


Enoxaparin Sodium (Lovenox)  40 mg SC DAILY [facility]; Protocol


   Last Admin: 10/24/18 08:51 Dose:  40 mg


Famotidine (Pepcid)  20 mg IVP Q12 [facility]


   Last Admin: 10/25/18 21:04 Dose:  20 mg


Fat Emulsion Intravenous (Intralipid 20%)  250 ml IV DAILY [facility]


   Last Admin: 10/25/18 17:09 Dose:  250 ml


Glucagon (Glucagen Diagnostic Kit)  0 mg IM STAT PRN; Protocol


   PRN Reason: Hypoglycemia Protocol


Hydromorphone HCl (Dilaudid)  1 mg IVP Q3 PRN


   PRN Reason: Pain, severe (8-10)


   Last Admin: 10/26/18 05:33 Dose:  1 mg


Potassium Chloride/Dextrose/Sod Cl (Potassium Chl 20 Meq In D5-Ns)  1,000 mls @ 

100 mls/hr IV .Q10H [facility]


   Last Admin: 10/25/18 11:54 Dose:  100 mls/hr


Insulin Human Lispro (Humalog)  0 units SC ACCU-CHECK JAM; Protocol


   Last Admin: 10/25/18 17:14 Dose:  Not Given


Lidocaine (Lidoderm)  1 ea TD DAILY [facility]


   Last Admin: 10/25/18 09:05 Dose:  1 ea


Lorazepam (Ativan)  0.5 mg IVP Q6 PRN


   PRN Reason: Agitation


   Last Admin: 10/26/18 07:27 Dose:  0.5 mg


Nystatin (Mycostatin Oint)  1 applic TOP TID [facility]


   Last Admin: 10/25/18 16:35 Dose:  1 applic


Ondansetron HCl (Zofran Inj)  2 mg IVP Q6 PRN


   PRN Reason: Nausea/Vomiting


   Last Admin: 10/24/18 03:00 Dose:  2 mg











- Labs


Labs: 


                                        





                                 10/25/18 05:55 





                                 10/26/18 05:50 





                                        











PT  14.5 Seconds (9.8-13.1)  H  10/25/18  05:55    


 


INR  1.3   10/25/18  05:55    


 


APTT  29.5 Seconds (25.6-37.1)   10/25/18  05:55    














- Constitutional


Appears: Well, No Acute Distress





- Head Exam


Head Exam: ATRAUMATIC, NORMOCEPHALIC





- Eye Exam


Eye Exam: Normal appearance





- ENT Exam


ENT Exam: Mucous Membranes Moist





- Respiratory Exam


Respiratory Exam: NORMAL BREATHING PATTERN





- Cardiovascular Exam


Cardiovascular Exam: RRR





- GI/Abdominal Exam


GI & Abdominal Exam: Soft.  absent: Distended, Tenderness, Rebound





- Extremities Exam


Extremities Exam: absent: Calf Tenderness





- Neurological Exam


Neurological Exam: Alert, Awake, Oriented x3





- Skin


Skin Exam: Dry, Warm





Assessment and Plan





- Assessment and Plan (Free Text)


Assessment: 





48F with partial small bowel obstruction 


Plan: 





- advance to [facility]


- monitor bowel function


- encourage ambulation


- wean off IV pain meds and restart home regimen of PO pain meds


- d/w Dr. Zoran Koroma

## 2018-10-27 VITALS
OXYGEN SATURATION: 98 % | DIASTOLIC BLOOD PRESSURE: 79 MMHG | TEMPERATURE: 97.9 F | SYSTOLIC BLOOD PRESSURE: 111 MMHG | HEART RATE: 100 BPM | RESPIRATION RATE: 18 BRPM

## 2018-10-27 LAB
BUN SERPL-MCNC: 3 MG/DL (ref 7–17)
CALCIUM SERPL-MCNC: 9.3 MG/DL (ref 8.4–10.2)
GFR NON-AFRICAN AMERICAN: > 60

## 2018-10-27 RX ADMIN — HYDROMORPHONE HYDROCHLORIDE PRN MG: 1 INJECTION, SOLUTION INTRAMUSCULAR; INTRAVENOUS; SUBCUTANEOUS at 06:11

## 2018-10-27 RX ADMIN — DEXTROSE MONOHYDRATE, SODIUM CHLORIDE, AND POTASSIUM CHLORIDE SCH: 50; 9; 1.49 INJECTION, SOLUTION INTRAVENOUS at 02:00

## 2018-10-27 RX ADMIN — NYSTATIN SCH APPLIC: 100000 OINTMENT TOPICAL at 13:30

## 2018-10-27 RX ADMIN — INSULIN LISPRO SCH: 100 INJECTION, SOLUTION INTRAVENOUS; SUBCUTANEOUS at 07:11

## 2018-10-27 RX ADMIN — HYDROMORPHONE HYDROCHLORIDE PRN MG: 1 INJECTION, SOLUTION INTRAMUSCULAR; INTRAVENOUS; SUBCUTANEOUS at 02:54

## 2018-10-27 RX ADMIN — NYSTATIN SCH APPLIC: 100000 OINTMENT TOPICAL at 09:13

## 2018-10-27 RX ADMIN — INSULIN LISPRO SCH UNITS: 100 INJECTION, SOLUTION INTRAVENOUS; SUBCUTANEOUS at 13:31

## 2018-10-27 RX ADMIN — INSULIN LISPRO SCH: 100 INJECTION, SOLUTION INTRAVENOUS; SUBCUTANEOUS at 17:19

## 2018-10-27 RX ADMIN — DEXTROSE MONOHYDRATE, SODIUM CHLORIDE, AND POTASSIUM CHLORIDE SCH MLS/HR: 50; 9; 1.49 INJECTION, SOLUTION INTRAVENOUS at 02:45

## 2018-10-27 RX ADMIN — ENOXAPARIN SODIUM SCH MG: 40 INJECTION SUBCUTANEOUS at 09:12

## 2018-10-27 NOTE — CP.PCM.PN
Addendum entered and electronically signed by Ariadna Cortes MD  10/27/18 15:43: 





Correction : Patient advanced to heart healthy diet by surgery today


pain medications changed to PO


continue monitoring 


if tolerating plan for d/c home





Addendum entered and electronically signed by Ariadna Cortes MD  10/27/18 10:54: 


Patient seen bedside .All chart and clinical data reviewed . Case discussed with

resident . agree with assessment sand plan .


Complaining of feeling nauseated. With episode of vomiting overnight


Passing flatus


Abdominal pain is controlled


Switched to NPO status as per surgery due to episodes of vomiting and started on

her home  pain regiment


Continue IVF 


Continue pain management








Original Note:








Subjective





- Date & Time of Evaluation


Date of Evaluation: 10/27/18


Time of Evaluation: 09:46





- Subjective


Subjective: 





Overnight pt had an episode of emesis, small NBNB. This AM pt endorsed feeling 

nauseous, and continues to have abdominal pain, controlled with PO pain meds at 

this time. BMs yesterday x 2 and normal voids. 





Objective





- Vital Signs/Intake and Output


Vital Signs (last 24 hours): 


                                        











Temp Pulse Resp BP Pulse Ox


 


 98.1 F   88   19   117/83   100 


 


 10/27/18 09:20  10/27/18 09:20  10/27/18 09:20  10/27/18 09:20  10/27/18 09:20








Intake and Output: 


                                        











 10/27/18 10/27/18





 06:59 18:59


 


Intake Total 1800 


 


Balance 1800 














- Medications


Medications: 


                               Current Medications





Benzocaine/Menthol (Cepacol Sore Throat)  1 judith PO Q2 PRN


   PRN Reason: Sore Throat


   Last Admin: 10/23/18 15:31 Dose:  1 judith


Dextrose (Dextrose 50% Inj)  0 ml IV STAT PRN; Protocol


   PRN Reason: Hypoglycemia Protocol


Dextrose (Glutose 15)  0 gm PO ONCE PRN; Protocol


   PRN Reason: Hypoglycemia Protocol


Duloxetine HCl (Cymbalta)  60 mg PO HS JAM


   Last Admin: 10/26/18 22:37 Dose:  60 mg


Enoxaparin Sodium (Lovenox)  40 mg SC DAILY JAM; Protocol


   Last Admin: 10/27/18 09:12 Dose:  40 mg


Famotidine (Pepcid)  20 mg PO Q12 JAM


   Last Admin: 10/27/18 09:13 Dose:  20 mg


Gabapentin (Neurontin)  400 mg PO Q8@0500,1300,2100 [facility]


   Last Admin: 10/27/18 05:23 Dose:  400 mg


Glucagon (Glucagen Diagnostic Kit)  0 mg IM STAT PRN; Protocol


   PRN Reason: Hypoglycemia Protocol


Hydromorphone HCl (Dilaudid)  2 mg PO Q4 PRN


   PRN Reason: Pain, moderate (4-7)


   Last Admin: 10/27/18 08:57 Dose:  2 mg


Potassium Chloride/Dextrose/Sod Cl (Potassium Chl 20 Meq In D5-Ns)  1,000 mls @ 

50 mls/hr IV .Q20H [facility]


   Stop: 10/28/18 08:22


Insulin Human Lispro (Humalog)  0 units SC ACCU-CHECK JAM; Protocol


   Last Admin: 10/27/18 07:11 Dose:  Not Given


Lidocaine (Lidoderm)  1 ea TD DAILY [facility]


   Last Admin: 10/27/18 09:12 Dose:  1 ea


Nystatin (Mycostatin Oint)  1 applic TOP TID [facility]


   Last Admin: 10/27/18 09:13 Dose:  1 applic


Ondansetron HCl (Zofran Inj)  2 mg IVP Q6 PRN


   PRN Reason: Nausea/Vomiting


   Last Admin: 10/27/18 09:04 Dose:  2 mg


Oxycodone HCl (Oxycontin Extended Release Tab)  60 mg PO Q12 [facility]


   Last Admin: 10/27/18 08:58 Dose:  60 mg











- Labs


Labs: 


                                        





                                 10/25/18 05:55 





                                 10/27/18 05:30 





                                        











PT  14.5 Seconds (9.8-13.1)  H  10/25/18  05:55    


 


INR  1.3   10/25/18  05:55    


 


APTT  29.5 Seconds (25.6-37.1)   10/25/18  05:55    














- Constitutional


Appears: No Acute Distress





- Head Exam


Head Exam: NORMAL INSPECTION





- Eye Exam


Eye Exam: EOMI





- ENT Exam


ENT Exam: Mucous Membranes Moist





- Respiratory Exam


Respiratory Exam: Clear to Ausculation Bilateral, NORMAL BREATHING PATTERN.  

absent: Wheezes





- Cardiovascular Exam


Cardiovascular Exam: REGULAR RHYTHM, +S1, +S2





- GI/Abdominal Exam


GI & Abdominal Exam: Soft, Normal Bowel Sounds.  absent: Tenderness


Additional comments: 





multiple surgical scars noted, old well healed 





- Extremities Exam


Extremities Exam: absent: Calf Tenderness, Pedal Edema





- Neurological Exam


Neurological Exam: Alert, Awake





Assessment and Plan





- Assessment and Plan (Free Text)


Assessment: 





Assessment/Plan: 


47 yo F with PMHx of HTN, chronic back/abdominal pain with multiple nerves 

blocks (pain management as out patient), Colon cancer w/ metastasis to liver (on

chemotherapy), Depression, Fibromyalgia, GERD admitted for SBO.


Discussion with patient about transfer to [facility] for evaluation by 

her physicians, but patient refused at this time. She would like to stay in [facility]

for further care and management. 


Per Surgery: SBO is improving will advance diet and all IV pain meds changed to 

PO. 





Small Bowel Obstruction possibly due to internal hernia


- improving 


- Surgery consult, Dr. Velarde/Dr. Meehan, recommendations appreciated; PO pain 

meds and regular diet 


- C/w IVF, Pain control: Hydromorphone, and oxycontin PO


- CT abd and pelvis incomplete mechanical obstruction.


- start heart healthy diet per surgery 


 


Chronic back pain 


- 2/2 metastatic colon Ca


- Chronic, H/O multiple lumbar surgeries. 


- Lidoderm patch for back pain


- Pain management consult, Dr. Winn, recommendations appreciated


- C/w pain management





NIDDM


- Controlled 


- Last A1c 7.7 on 5/28/18


- Low dose sliding scale


- Hold metformin 


- Hypoglycemia protocol 





HTN


- Controlled


- Hold Amlodipine 10 mg QD


- Monitor vitals





Skin Rash


- improving 


- likely candida skin infection 


- nystatin TOP TID





Fibromyalgia


- Controlled


- Gabapentin 400 mg Q8h


- Duloxetine 60 mg PO HS





DVT


- lovenox 40mg

## 2018-10-27 NOTE — CP.PCM.PN
Subjective





- Date & Time of Evaluation


Date of Evaluation: 10/27/18


Time of Evaluation: 07:00





- Subjective


Subjective: 


GENERAL SURGERY PROGRESS NOTE FOR DR. EDWARDS





Patient seen and examined at bedside. She reports 2 BMs yesterday and is passing

flatus. She vomited once yesterday. Reports mild abdominal pain controlled with 

pain medication. Pt reports she is ambulating.





Objective





- Vital Signs/Intake and Output


Vital Signs (last 24 hours): 


                                        











Temp Pulse Resp BP Pulse Ox


 


 97.8 F   102 H  18   104/71   99 


 


 10/27/18 00:09  10/27/18 00:09  10/27/18 00:09  10/27/18 00:09  10/27/18 00:09








Intake and Output: 


                                        











 10/27/18 10/27/18





 06:59 18:59


 


Intake Total 1800 


 


Balance 1800 














- Medications


Medications: 


                               Current Medications





Benzocaine/Menthol (Cepacol Sore Throat)  1 judith PO Q2 PRN


   PRN Reason: Sore Throat


   Last Admin: 10/23/18 15:31 Dose:  1 judith


Dextrose (Dextrose 50% Inj)  0 ml IV STAT PRN; Protocol


   PRN Reason: Hypoglycemia Protocol


Dextrose (Glutose 15)  0 gm PO ONCE PRN; Protocol


   PRN Reason: Hypoglycemia Protocol


Duloxetine HCl (Cymbalta)  60 mg PO HS [facility]


   Last Admin: 10/26/18 22:37 Dose:  60 mg


Enoxaparin Sodium (Lovenox)  40 mg SC DAILY JAM; Protocol


   Last Admin: 10/26/18 09:36 Dose:  40 mg


Famotidine (Pepcid)  20 mg PO Q12 [facility]


   Last Admin: 10/26/18 21:04 Dose:  20 mg


Gabapentin (Neurontin)  400 mg PO Q8@0500,1300,2100 [facility]


   Last Admin: 10/27/18 05:23 Dose:  400 mg


Glucagon (Glucagen Diagnostic Kit)  0 mg IM STAT PRN; Protocol


   PRN Reason: Hypoglycemia Protocol


Home Med (Oxycodone Hcl [Oxycontin])  60 mg PO Q12 JAM


Hydromorphone HCl (Dilaudid)  2 mg PO Q4 PRN


   PRN Reason: Pain, moderate (4-7)


Potassium Chloride/Dextrose/Sod Cl (Potassium Chl 20 Meq In D5-Ns)  1,000 mls @ 

100 mls/hr IV .Q10H [facility]


   Stop: 10/28/18 01:22


   Last Admin: 10/27/18 02:45 Dose:  100 mls/hr


Insulin Human Lispro (Humalog)  0 units SC ACCU-CHECK JAM; Protocol


   Last Admin: 10/27/18 07:11 Dose:  Not Given


Lidocaine (Lidoderm)  1 ea TD DAILY JAM


   Last Admin: 10/26/18 09:35 Dose:  1 ea


Nystatin (Mycostatin Oint)  1 applic TOP TID JAM


   Last Admin: 10/26/18 19:45 Dose:  1 applic


Ondansetron HCl (Zofran Inj)  2 mg IVP Q6 PRN


   PRN Reason: Nausea/Vomiting


   Last Admin: 10/27/18 02:51 Dose:  2 mg











- Labs


Labs: 


                                        





                                 10/25/18 05:55 





                                 10/27/18 05:30 





                                        











PT  14.5 Seconds (9.8-13.1)  H  10/25/18  05:55    


 


INR  1.3   10/25/18  05:55    


 


APTT  29.5 Seconds (25.6-37.1)   10/25/18  05:55    














- Constitutional


Appears: Non-toxic, No Acute Distress, Chronically Ill





- Head Exam


Head Exam: ATRAUMATIC, NORMAL INSPECTION





- Respiratory Exam


Respiratory Exam: NORMAL BREATHING PATTERN.  absent: Respiratory Distress





- Cardiovascular Exam


Cardiovascular Exam: +S1, +S2





- GI/Abdominal Exam


GI & Abdominal Exam: Soft.  absent: Distended, Firm, Guarding, Rigid, 

Tenderness, Rebound





- Neurological Exam


Neurological Exam: Alert, Awake, Oriented x3





- Psychiatric Exam


Psychiatric exam: Normal Affect, Normal Mood





- Skin


Skin Exam: Dry, Normal Color, Warm





Assessment and Plan





- Assessment and Plan (Free Text)


Assessment: 


48F w. hx of metastatic colon CA s/p L colectomy and oopherectomy who presented 

with SBO, now resolving





- IV dilaudid DCed, patient switched to home pain medication regimen of PO 

dilaudid and oxycodone.


- Advanced to soft diet


- Ensure ordered as per pt request


- Pt having BMs and passing flatus, benign abdominal exam


- If tolerates soft diet, clear for DC home from surgical standpoint


- Discussed plan with Dr. Zoran Manrique PGY-4

## 2018-10-27 NOTE — CP.PCM.DIS
Provider





- Provider


Date of Admission: 


10/12/18 15:56





Attending physician: 


Edin Patel MD





Primary care physician: 


none


Consults: 


Surgery consult 


pain management 


Time Spent in preparation of Discharge (in minutes): 15





Hospital Course





- Lab Results


Lab Results: 


                             Most Recent Lab Values











WBC  6.7 K/uL (4.8-10.8)   10/25/18  05:55    


 


RBC  4.43 Mil/uL (3.80-5.20)   10/25/18  05:55    


 


Hgb  12.2 g/dL (12.0-16.0)   10/25/18  05:55    


 


Hct  37.0 % (34.0-47.0)   10/25/18  05:55    


 


MCV  83.5 fl (81.0-99.0)   10/25/18  05:55    


 


MCH  27.6 pg (27.0-31.0)   10/25/18  05:55    


 


MCHC  33.0 g/dL (33.0-37.0)   10/25/18  05:55    


 


RDW  15.4 % (11.5-14.5)  H  10/25/18  05:55    


 


Plt Count  198 K/uL (130-400)   10/25/18  05:55    


 


MPV  9.2 fl (7.2-11.7)   10/24/18  05:55    


 


Neut % (Auto)  62.9 % (50.0-75.0)   10/24/18  05:55    


 


Lymph % (Auto)  17.8 % (20.0-40.0)  L  10/24/18  05:55    


 


Mono % (Auto)  14.9 % (0.0-10.0)  H  10/24/18  05:55    


 


Eos % (Auto)  4.0 % (0.0-4.0)   10/24/18  05:55    


 


Baso % (Auto)  0.4 % (0.0-2.0)   10/24/18  05:55    


 


Neut # (Auto)  4.6 K/uL (1.8-7.0)   10/24/18  05:55    


 


Lymph # (Auto)  1.3 K/uL (1.0-4.3)   10/24/18  05:55    


 


Mono # (Auto)  1.1 K/uL (0.0-0.8)  H  10/24/18  05:55    


 


Eos # (Auto)  0.3 K/uL (0.0-0.7)   10/24/18  05:55    


 


Baso # (Auto)  0.0 K/uL (0.0-0.2)   10/24/18  05:55    


 


Neutrophils % (Manual)  57 % (42-75)   10/19/18  05:55    


 


Lymphocytes % (Manual)  28 % (20-50)   10/19/18  05:55    


 


Monocytes % (Manual)  15 % (0-10)  H  10/19/18  05:55    


 


Platelet Estimate  Normal  (NORMAL)   10/19/18  05:55    


 


Large Platelets  Present   10/19/18  05:55    


 


Anisocytosis (manual)  Slight   10/19/18  05:55    


 


Microcytosis (manual)  Slight   10/19/18  05:55    


 


Ovalocytes  Moderate   10/19/18  05:55    


 


PT  14.5 Seconds (9.8-13.1)  H  10/25/18  05:55    


 


INR  1.3   10/25/18  05:55    


 


APTT  29.5 Seconds (25.6-37.1)   10/25/18  05:55    


 


pCO2  48 mm/Hg (35-45)  H  10/16/18  10:00    


 


pO2  76 mm/Hg ()  L  10/16/18  10:00    


 


HCO3  36.6 mmol/L (21-28)  H  10/16/18  10:00    


 


ABG pH  7.53  (7.35-7.45)  H  10/16/18  10:00    


 


ABG Total CO2  41.6 mmol/L (22-28)  H  10/16/18  10:00    


 


ABG O2 Saturation  98.8 % (95-98)  H  10/16/18  10:00    


 


ABG O2 Content  19.2 ML/dL (15-23)   10/16/18  10:00    


 


ABG Base Excess  15.1 mmol/L (-2.0-3.0)  H  10/16/18  10:00    


 


ABG Hemoglobin  14.6 g/dL (11.7-17.4)   10/16/18  10:00    


 


ABG Carboxyhemoglobin  3.5 % (0.5-1.5)  H  10/16/18  10:00    


 


POC ABG HHb (Measured)  1.1 % (0.0-5.0)   10/16/18  10:00    


 


ABG Methemoglobin  2.1 % (0.0-3.0)   10/16/18  10:00    


 


ABG O2 Capacity  19.4 mL/dL (16-24)   10/16/18  10:00    


 


Patricio Test  Yes   10/16/18  10:00    


 


ABG Potassium  3.0 mmol/L (3.6-5.2)  L  10/12/18  17:27    


 


A-a O2 Difference  14.0 mm/Hg  10/16/18  10:00    


 


Hgb O2 Saturation  93.3 % (95.0-98.0)  L  10/16/18  10:00    


 


Sodium  134.0 mmol/L (132-148)   10/12/18  17:27    


 


Chloride  98.0 mmol/L ()   10/12/18  17:27    


 


Glucose  172 mg/dL ()  H  10/12/18  17:27    


 


Lactate  1.3 mmol/L (0.7-2.1)   10/12/18  17:27    


 


Vent Mode  Ra   10/16/18  10:00    


 


FiO2  21.0 %  10/16/18  10:00    


 


Sodium  137 mmol/l (132-148)   10/27/18  05:30    


 


Potassium  4.1 MMOL/L (3.6-5.0)   10/27/18  05:30    


 


Chloride  102 mmol/L ()   10/27/18  05:30    


 


Carbon Dioxide  25 mmol/L (22-30)   10/27/18  05:30    


 


Anion Gap  14  (10-20)   10/27/18  05:30    


 


BUN  3 mg/dl (7-17)  L  10/27/18  05:30    


 


Creatinine  0.4 mg/dl (0.7-1.2)  L  10/27/18  05:30    


 


Est GFR ( Amer)  > 60   10/27/18  05:30    


 


Est GFR (Non-Af Amer)  > 60   10/27/18  05:30    


 


POC Glucose (mg/dL)  142 mg/dL ()  H  10/26/18  21:51    


 


Random Glucose  157 mg/dL ()  H  10/27/18  05:30    


 


Calcium  9.3 mg/dL (8.4-10.2)   10/27/18  05:30    


 


Phosphorus  3.5 mg/dl (2.5-4.5)   10/26/18  05:50    


 


Magnesium  2.1 MG/DL (1.6-2.3)   10/26/18  05:50    


 


Total Bilirubin  0.3 mg/dl (0.2-1.3)   10/25/18  05:55    


 


AST  30 U/L (14-36)   10/25/18  05:55    


 


ALT  38 U/L (9-52)   10/25/18  05:55    


 


Alkaline Phosphatase  128 U/L ()  H  10/25/18  05:55    


 


Total Protein  6.8 G/DL (6.3-8.2)   10/25/18  05:55    


 


Albumin  3.3 g/dL (3.5-5.0)  L  10/25/18  05:55    


 


Globulin  3.5 gm/dL (2.2-3.9)   10/25/18  05:55    


 


Albumin/Globulin Ratio  0.9  (1.0-2.1)  L  10/25/18  05:55    


 


Triglycerides  105 mg/DL (0-149)   10/19/18  05:55    


 


Lipase  40 U/L ()   10/12/18  12:50    


 


Arterial Blood Potassium  3.0 mmol/L (3.6-5.2)  L  10/12/18  17:27    


 


Urine HCG, Qual  Negative  (NEGATIVE)   10/14/18  14:30    














- Hospital Course


Hospital Course: 


49 yo F with PMHx colon cancer with liver and ovary metastasis s/p robotic left 

colectomy and oophorectomy with attempted liver resection , s/p neoadjuvant 

chemotherapy treatment , HTN, chronic back pain with cervical and lumbar fusion 

and multiple nerve blocks for pain management as out patient and on narcotic Po 

medications, Depression, Fibromyalgia, GERD admitted with a  sudden onset of 

severe abdominal pain and was diagnosed with SBO. Patient was admitted in 

med/surg, kept NPO, NGT was placed and surgery Dr. Meehan and his team were 

consulted  . She was started initially on IVF , had daily bood work up 

monitoring her electrolytes . Pain managemnt was cosnulted for help with her 

pain control during this hospital stay . 


She was also started on TPN for few days for nutritional support while being 

closely monitored for her GI function while NPO 


patient clinically improved , started having bowel movements and finally able to

tolerate soft diet . She was switched to Po pain medications and pain has been 

well controlled .


She is cleared by surgery for discharge home on her home meds and with follow up

with Dr. Meehan as outpatient and her oncologist





Dx 


1.Small Bowel Obstruction possibly due to internal hernia- resolved 


2.Chronic back pain - s/p spinal fusion and multiple nerve blocks. Continue pain

management 


3.NIDDM, chronic , controlled . Resume home meds on discharge


4.HTN,controlled. resume Amlodipine on discharge


5.Metastatic Colon cancer - follow up with her oncologist Dr. Sandoval at 

[place] 


6. Depression - with flat affect. On Cymbalta


7.Skin Rash,likely candidiasis  skin infection -- improving with bystatin 


8.Fibromyalgia

















Discharge Exam





- Head Exam


Head Exam: ATRAUMATIC, NORMAL INSPECTION, NORMOCEPHALIC


Additional comments: 





flat affect





- Eye Exam


Eye Exam: EOMI, PERRL


Pupil Exam: NORMAL ACCOMODATION





- ENT Exam


ENT Exam: Mucous Membranes Moist, Normal Exam





- Neck Exam


Neck exam: Full Rom, Normal Inspection





- Respiratory Exam


Respiratory Exam: Clear to PA & Lateral, NORMAL BREATHING PATTERN.  absent: 

Respiratory Distress





- GI/Abdominal Exam


GI & Abdominal Exam: Normal Bowel Sounds, Soft.  absent: Distended, Guarding, 

Rigid


Additional comments: 





multiple scars over abdominal wall 





- Rectal Exam


Rectal Exam: Deferred





- Extremities Exam


Extremities exam: normal capillary refill, normal inspection, pedal pulses 

present





- Back Exam


Back exam: NORMAL INSPECTION





- Neurological Exam


Neurological exam: Alert, CN II-XII Intact, Oriented x3





- Psychiatric Exam


Psychiatric exam: Flat Affect





- Skin


Skin Exam: Dry, Warm





Discharge Plan





- Follow Up Plan


Condition: IMPROVED


Disposition: HOME/ ROUTINE


Patient education suggested?: Yes


Referrals: 


Thanh Meehan MD [Medical Doctor] -

## 2018-11-03 ENCOUNTER — HOSPITAL ENCOUNTER (OUTPATIENT)
Dept: HOSPITAL 14 - H.ER | Age: 48
Setting detail: OBSERVATION
Discharge: HOME | End: 2018-11-03
Attending: INTERNAL MEDICINE | Admitting: INTERNAL MEDICINE
Payer: MEDICAID

## 2018-11-03 VITALS
HEART RATE: 87 BPM | TEMPERATURE: 97.9 F | DIASTOLIC BLOOD PRESSURE: 99 MMHG | RESPIRATION RATE: 20 BRPM | SYSTOLIC BLOOD PRESSURE: 146 MMHG

## 2018-11-03 VITALS — BODY MASS INDEX: 25 KG/M2

## 2018-11-03 VITALS — OXYGEN SATURATION: 99 %

## 2018-11-03 DIAGNOSIS — M54.9: ICD-10-CM

## 2018-11-03 DIAGNOSIS — E11.9: ICD-10-CM

## 2018-11-03 DIAGNOSIS — M79.7: ICD-10-CM

## 2018-11-03 DIAGNOSIS — I10: ICD-10-CM

## 2018-11-03 DIAGNOSIS — Z79.899: ICD-10-CM

## 2018-11-03 DIAGNOSIS — F32.9: ICD-10-CM

## 2018-11-03 DIAGNOSIS — C78.7: ICD-10-CM

## 2018-11-03 DIAGNOSIS — M19.90: ICD-10-CM

## 2018-11-03 DIAGNOSIS — Z79.84: ICD-10-CM

## 2018-11-03 DIAGNOSIS — C18.9: Primary | ICD-10-CM

## 2018-11-03 DIAGNOSIS — J90: ICD-10-CM

## 2018-11-03 DIAGNOSIS — F41.9: ICD-10-CM

## 2018-11-03 DIAGNOSIS — K21.9: ICD-10-CM

## 2018-11-03 DIAGNOSIS — Z85.038: ICD-10-CM

## 2018-11-03 DIAGNOSIS — G89.29: ICD-10-CM

## 2018-11-03 DIAGNOSIS — Z90.49: ICD-10-CM

## 2018-11-03 LAB
ALBUMIN SERPL-MCNC: 3.8 G/DL (ref 3.5–5)
ALBUMIN/GLOB SERPL: 0.8 {RATIO} (ref 1–2.1)
ALT SERPL-CCNC: 89 U/L (ref 9–52)
ANISOCYTOSIS BLD QL SMEAR: SLIGHT
APTT BLD: 36.6 SECONDS (ref 25.6–37.1)
AST SERPL-CCNC: 127 U/L (ref 14–36)
BASOPHILS # BLD AUTO: 0 K/UL (ref 0–0.2)
BASOPHILS NFR BLD: 0.3 % (ref 0–2)
BUN SERPL-MCNC: 4 MG/DL (ref 7–17)
CALCIUM SERPL-MCNC: 9.5 MG/DL (ref 8.4–10.2)
EOSINOPHIL # BLD AUTO: 0.2 K/UL (ref 0–0.7)
EOSINOPHIL NFR BLD: 2.5 % (ref 0–4)
ERYTHROCYTE [DISTWIDTH] IN BLOOD BY AUTOMATED COUNT: 16.8 % (ref 11.5–14.5)
GFR NON-AFRICAN AMERICAN: > 60
HGB BLD-MCNC: 12.8 G/DL (ref 12–16)
INR PPP: 1.2
LIPASE SERPL-CCNC: 34 U/L (ref 23–300)
LYMPHOCYTE: 10 % (ref 20–50)
LYMPHOCYTES # BLD AUTO: 0.7 K/UL (ref 1–4.3)
LYMPHOCYTES NFR BLD AUTO: 9.5 % (ref 20–40)
MCH RBC QN AUTO: 27.4 PG (ref 27–31)
MCHC RBC AUTO-ENTMCNC: 32.9 G/DL (ref 33–37)
MCV RBC AUTO: 83.5 FL (ref 81–99)
MONOCYTE: 13 % (ref 0–10)
MONOCYTES # BLD: 0.8 K/UL (ref 0–0.8)
MONOCYTES NFR BLD: 11.1 % (ref 0–10)
NEUTROPHILS # BLD: 5.7 K/UL (ref 1.8–7)
NEUTROPHILS NFR BLD AUTO: 76.6 % (ref 50–75)
NEUTROPHILS NFR BLD AUTO: 77 % (ref 42–75)
NRBC BLD AUTO-RTO: 0.1 % (ref 0–0)
PLATELET # BLD EST: NORMAL 10*3/UL
PLATELET # BLD: 296 K/UL (ref 130–400)
PMV BLD AUTO: 9.6 FL (ref 7.2–11.7)
PROTHROMBIN TIME: 14.1 SECONDS (ref 9.8–13.1)
RBC # BLD AUTO: 4.68 MIL/UL (ref 3.8–5.2)
TOTAL CELLS COUNTED BLD: 100
WBC # BLD AUTO: 7.5 K/UL (ref 4.8–10.8)

## 2018-11-03 PROCEDURE — 80053 COMPREHEN METABOLIC PANEL: CPT

## 2018-11-03 PROCEDURE — 85730 THROMBOPLASTIN TIME PARTIAL: CPT

## 2018-11-03 PROCEDURE — 85610 PROTHROMBIN TIME: CPT

## 2018-11-03 PROCEDURE — 83690 ASSAY OF LIPASE: CPT

## 2018-11-03 PROCEDURE — 81025 URINE PREGNANCY TEST: CPT

## 2018-11-03 PROCEDURE — 82948 REAGENT STRIP/BLOOD GLUCOSE: CPT

## 2018-11-03 PROCEDURE — 85025 COMPLETE CBC W/AUTO DIFF WBC: CPT

## 2018-11-03 PROCEDURE — 99285 EMERGENCY DEPT VISIT HI MDM: CPT

## 2018-11-03 PROCEDURE — 74177 CT ABD & PELVIS W/CONTRAST: CPT

## 2018-11-03 PROCEDURE — 83605 ASSAY OF LACTIC ACID: CPT

## 2018-11-03 RX ADMIN — INSULIN LISPRO SCH: 100 INJECTION, SOLUTION INTRAVENOUS; SUBCUTANEOUS at 12:17

## 2018-11-03 RX ADMIN — INSULIN LISPRO SCH: 100 INJECTION, SOLUTION INTRAVENOUS; SUBCUTANEOUS at 07:36

## 2018-11-03 NOTE — CT
Date of service: 



11/03/2018



PROCEDURE:  CT Abdomen and Pelvis with and without intravenous 

contrast



HISTORY:

abd pain hx CA/SBO



COMPARISON:

None.



TECHNIQUE:

Axial images of the abdomen were obtained in the pre contrast, portal 

venous and delayed phases of enhancement. Coronal and sagittal 

reformats were generated.



Contrast dose: 



Radiation dose:



Total exam DLP = 635.6 mGy-cm.



This CT exam was performed using one or more of the following dose 

reduction techniques: Automated exposure control, adjustment of the 

mA and/or kV according to patient size, and/or use of iterative 

reconstruction technique.



FINDINGS:



LOWER THORAX:

Small right pleural effusion. 



LIVER:

9.3 x 6.7 centimeter heterogeneous infiltrative mass in the right 

hepatic lobe, unchanged with associated irregularity of the hepatic 

contour and capsular infiltration.  Small amount of perihepatic free 

fluid. 



GALLBLADDER AND BILE DUCTS:

Cholecystectomy. 



PANCREAS:

Unremarkable. No gross lesion or ductal dilatation.



SPLEEN:

Unremarkable. 



ADRENALS:

Unremarkable. No mass. 



KIDNEYS AND URETERS:

Unremarkable. No hydronephrosis. No solid mass. 



VASCULATURE:

Unremarkable. No aortic aneurysm. No aortic atherosclerotic 

calcification or mural plaque present.



BOWEL:

Unremarkable. No obstruction. No gross mural thickening. 



APPENDIX:

Normal appendix. 



PERITONEUM:

Unremarkable. No free fluid. No free air. 



LYMPH NODES:

Lupe hepatis and retroperitoneal lymphadenopathy. 



BLADDER:

Unremarkable. 



REPRODUCTIVE:

Unremarkable. 



BONES:

No acute fracture. 



OTHER FINDINGS:

None.



IMPRESSION:

9.3 x 6.7 centimeter heterogeneous infiltrative mass in the right 

hepatic lobe, unchanged with associated irregularity of the hepatic 

contour and capsular infiltration.  Small amount of perihepatic free 

fluid.  Lupe hepatis and retroperitoneal lymphadenopathy.

## 2018-11-03 NOTE — ED PDOC
HPI: Abdomen


Time Seen by Provider: 11/03/18 00:21


Chief Complaint (Nursing): Abdominal Pain


Chief Complaint (Provider): Abdominal Pain


History Per: Patient


History/Exam Limitations: no limitations


Onset/Duration Of Symptoms: Days (x5)


Current Symptoms Are (Timing): Still Present


Additional Complaint(s): 


48 year old  female, well-known to ED for multiple visits with pmHx of 

GERD, DM, HTN, and metastatic colon cancer, presents with complaints of sharp, 

constant abdominal pain associated with nausea for the past 5 days. Patient was 

recently admitted on 10/12/18 for internal herniation with SBO. She states the 

abdominal pain became worse today with 3 episodes of non-bloody, non-bilious 

vomiting en route to ED. She denies any fever, chills, or diarrhea. 





PMD: Dr. Carlin Kaur





Past Medical History


Reviewed: Historical Data, Nursing Documentation, Vital Signs


Vital Signs: 





                                Last Vital Signs











Temp  98.2 F   11/03/18 00:10


 


Pulse  113 H  11/03/18 00:10


 


Resp  16   11/03/18 00:10


 


BP  113/90   11/03/18 00:10


 


Pulse Ox  99   11/03/18 00:10














- Medical History


PMH: Anxiety, Arthritis, Back Problems, Depression, Diabetes, Fibromyalgia, 

GERD, HTN, Malignancy


   Denies: Terrance's Disease, Alzheimer's Disease, Anemia, Asthma, Atrial 

Fibrillation, Benign Prostatic Hyperplasia, Bipolar Disorder, Bronchitis, Cardia

Arrhythmia, Cardiac Aneurysm, CHF, Colonic Polyps, COPD, Crohn's Disease, Cushi

ng's Syndrome, Dementia, Diverticulitis, Deep Vein Thrombosis, Emphysema, 

Fractures, Gastrointestinal Ulcer, Gall Bladder Disease, Graves' Disease, Hiatal

Hernia, HIV, Hypercholesterolemia, Hyperlipidemia, Hyperthyroidism, 

Hypothyroidism, Kidney Stones, Migraine, Mitral Valve Prolapse, Multiple Scle

rosis, Obstructive Bowel, Osteoporosis, Pancreatitis, Parkinson's Disease, 

Pericarditis, Peripheral Edema, Personality Disorder, Pneumonia, Pneumothorax, 

Post Traumatic Stress Disorder, Pulmonary Embolism, End Stage Renal Disease, 

Chronic Kidney Disease, Rheumatoid Arthritis, Schizophrenia, Seizures, Sickle 

Cell Disease, Sexually Transmitted Disease, Sleep Apnea, TIA





- Surgical History


Surgical History: Back Surgery, Endoscopy


   Denies: Appendectomy, CABG, Carotid Endarterectomy, Cholecystectomy, Coronary

Stent, Tonsillectomy





- Family History


Family History: States: Unknown Family Hx





- Immunization History


Hx Influenza Vaccination: Yes


Hx Pneumococcal Vaccination: No





- Home Medications


Home Medications: 


                                Ambulatory Orders











 Medication  Instructions  Recorded


 


RX: amLODIPine [Norvasc] 10 mg PO DAILY #0 tab 10/12/15


 


RX: DULoxetine [Cymbalta] 60 mg PO HS 10/30/15


 


RX: Omeprazole Magnesium [Prilosec 40 mg PO DAILY 08/08/18





Otc]  


 


RX: metFORMIN [glucOPHAGE] 500 mg PO DAILY 08/29/18


 


RX: HYDROmorphone [Dilaudid] 2 mg PO Q4 #42 tab 09/01/18


 


RX: Gabapentin [Neurontin] 400 mg PO Q8 10/12/18


 


RX: Oxycodone HCl [Oxycontin] 60 mg PO Q12 10/12/18


 


RX: Polyethylene Glycol 3350 17 gm PO Q12 PRN 10/12/18





[Miralax]  


 


RX: Nystatin [Mycostatin Oint] 1 applic TOP TID  tube 10/27/18














- Allergies


Allergies/Adverse Reactions: 


                                    Allergies











Allergy/AdvReac Type Severity Reaction Status Date / Time


 


penicillin G Allergy  RASH Verified 10/12/18 12:23


 


vancomycin Allergy  RASH Verified 10/12/18 12:23














Review of Systems


ROS Statement: Except As Marked, All Systems Reviewed And Found Negative


Constitutional: Negative for: Fever, Chills


Gastrointestinal: Positive for: Nausea, Vomiting (NBNB x3), Abdominal Pain.  

Negative for: Diarrhea





Physical Exam





- Reviewed


Nursing Documentation Reviewed: Yes


Vital Signs Reviewed: Yes





- Physical Exam


Appears: Positive for: Non-toxic, Uncomfortable


Head Exam: Positive for: ATRAUMATIC, NORMAL INSPECTION, NORMOCEPHALIC


Skin: Positive for: Normal Color


Eye Exam: Positive for: Normal appearance


ENT: Positive for: Normal ENT Inspection


Neck: Positive for: Normal


Cardiovascular/Chest: Positive for: Regular Rate, Rhythm


Respiratory: Positive for: Normal Breath Sounds.  Negative for: Respiratory 

Distress


Gastrointestinal/Abdominal: Positive for: Soft, Tenderness (diffusely)


Back: Positive for: Normal Inspection.  Negative for: L CVA Tenderness, R CVA 

Tenderness


Extremity: Positive for: Normal ROM (upper/lower)


Neurologic/Psych: Positive for: Alert, Oriented





- Laboratory Results


Result Diagrams: 


                                 11/03/18 01:30





                                 11/03/18 01:30





- ECG


O2 Sat by Pulse Oximetry: 99 (RA)


Pulse Ox Interpretation: Normal





Medical Decision Making


Medical Decision Making: 


Initial Impression: 48 year old  female with abdominal pain, nausea, and

vomiting in setting of known herniation, SBO, and metastatic cancer.


Initial Plan:


* CT ABD/pelvis with IV contrast


* Labs


* Dilaudid 2mg IV


* IV fluids


* Zofran INJ 4mg IV





Time: 0345


--CT ABD/pelvis Findings:


Mild diffuse irregularity of the hepatic contour.


9.3x6.7 cm heterogeneous infiltrative mass of the right hepatic lobe is 

unchanged.


Adjacent hepatic capsular infiltration is unchanged.


Mild amount of perihepatic free fluid is unchanged.


Unchanged mild right pleural effusion with passive atelectatic airspace disease 

of the right lower lobe.


Unchanged large annalisa hepatis the largest measuring 1.3 cm.


There is interval appearance of mild intrahepatic biliary ductal dilatation.


Absent gallbladder, unchanged.


The spleen is normal. 


The pancreas is of normal contour and attenuation characteristics. 


There is no evidence of adrenal mass.


Both kidneys demonstrate prompt and equal nephrograms. 


The kidneys are normal in size, shape and configuration. There is no evidence of

renal or ureteral mass. No renal or ureteral calculi are identified. There is no

hydroureter or hydronephrosis.


No evidence for appendicitis. There is no bowel wall thickening. No evidence for

small or large bowel obstruction. 


There is no evidence of intrinsic or extrinsic bladder mass. 


The bony structures are free of lytic or blastic lesions.


Changes from prior disc surgery at L4-L5.


IMPRESSION: 


Mild diffuse irregularity of the hepatic contour.


9.3x6.7 cm heterogeneous infiltrative mass of the right hepatic lobe is 

unchanged.


Adjacent hepatic capsular infiltration is unchanged.


Mild amount of perihepatic free fluid is unchanged.


Unchanged mild right pleural effusion with passive atelectatic airspace disease 

of the right lower lobe.


Unchanged large annalisa hepatis the largest measuring 1.3 cm.


There is interval appearance of mild intrahepatic biliary ductal dilatation.


Absent gallbladder, unchanged.





Time: 0357


--Re-eval: patient reports persistent abdominal pain. Additional Dilaudid 

ordered. Due to patient's significant medical history, she will be placed on 

observation under Dr. Prado's care.





-----------------------------------------------

-------------------------------------------------------------------------


Scribe Attestation:


Documented by Cat Partida, acting as a scribe for Zana Contreras MD.


Provider Scribe Attestation:


All medical record entries made by the Scribe were at my direction and 

personally dictated by me. I have reviewed the chart and agree that the record 

accurately reflects my personal performance of the history, physical exam, 

medical decision making, and the department course for this patient. I have also

personally directed, reviewed, and agree with the discharge instructions and 

disposition.





Disposition





- Clinical Impression


Clinical Impression: 


 Colon cancer metastasized to liver, Abdominal pain








- Patient ED Disposition


Is Patient to be Admitted: Yes





- Disposition


Disposition Time: 03:30


Condition: FAIR

## 2018-11-03 NOTE — CP.[email or web address].HP
Addendum entered and electronically signed by Edin Patel MD  11/03/18 

11:41: 





Patient had relief from pain and wished to go home. She will follow up in 

[facility].





Addendum entered and electronically signed by Jeferson Recio MD  11/03/18 

10:44: 





S: pt seen and examined at bedside this morning. Pt denies improved control and 

alleviation of abdominal pain. Reports current meds do nothing for pain. Denies 

any fever/chills, nausea/vomiting/or diarrhea today. 


O: Gen: NAD, Lying in bed, uncomfortable


oral: moist mucous membranes


CV: RRR, S1S2+, No MRG


Lungs: CTA B/L, No WRR


Abd: soft, tender diffusely, voluntary guarding, no rigidity/distention


Ext: no pedal edema, 2+ DP pulses b/l


Skin: no rashes


A/P: 49 y/o female with hx of colon CA admitted for intractable abdominal pain.


-Pain control, pain management consult pending


-NPO


-IV Fluid hydration











Original Note:








<Meagan Francis - Last Filed: 11/03/18 05:19>





History of Present Illness





- History of Present Illness


History of Present Illness: 





This is 49 y/o F with PMH of HTN, chronic back/abdominal pain with multiple 

nerves blocks (Sees pain management as out patient), Colon cancer w/ metastasis 

to liver (on chemotherapy), Depression, Fibromyalgia, GERD admitted to the [facility] 

for evaluation and treatment of acute on chronic intractable abdominal pain and 

chronic back pain. Patient presented to the ER c/o intractable severe abdominal 

pain which started 5 days ago associated with nausea and vomiting. Pain is 

constant, 10/10, sharp in nature, generalized, no alleviating 

(Dilaudid/Oxycontine) or aggravating factors and associated with 

nausea/vomiting, 3x nonbloddy nonbillious vomiting today with 1 loos BM. Patient

did not get her Chrmo this Wednesday due to this severe abdominal pain. Denies 

fever, cough, SOB,chest pain, rectal bleeding, pedal edema, dysuria. Patient s/p

bilateral sacroiliac joint injections for back pain on 7/21, Celiac plexus block

7/30/18. Patient has been seen by  Heme-Oncologist: Dr. Hilton Sandoval at 

[facility].  





PCP: [facility]





Heme-Oncologist: Dr. Hilton Sandoval at [facility] on chemotherapy 


Pain management: Dr. Winn


PMH: chronic back pain, fibromyalgia, HTN, GERD, colon cancer, anxiety, Colon 

cancer w/ metastasis to liver (on chemotherapy)


PSH: C6 fusion, Colon resection 2015  and b/l oopherectomy, chemoembolization of

liver.


Allg: penicillin 


SocialHx: denies ETOH/tobacco/drug abuse


FamilyHx: DM, HTN


Meds: as per med rec





ROS as per HPI





ER course: 


Tm 98.2, , RR 16, 113/90, Spo2 99


CBC: WNL


CMP: Significant for , ALT 89, ALKP 299


CT abdo: Mostly unchanged CT from prior studies 


S/p Dilaudid


S/p IVF /Bolus 


S/p Zofran 








Present on Admission





- Present on Admission


Any Indicators Present on Admission: No





Past Patient History





- Infectious Disease


Hx of Infectious Diseases: None





- Tetanus Immunizations


Tetanus Immunization: Unknown





- Past Medical History & Family History


Past Medical History?: Yes





- Past Social History


Smoking Status: Never Smoked





- CARDIAC


Hx Atrial Fibrillation: No


Hx Cardia Arrhythmia: No


Hx Congestive Heart Failure: No


Hx Hypercholesterolemia: No


Hx Hypertension: Yes


Hx Mitral Valve Prolapse: No


Hx Peripheral Edema: No





- PULMONARY


Hx Asthma: No


Hx Bronchitis: No


Hx Chronic Obstructive Pulmonary Disease (COPD): No


Hx Emphysema: No


Hx Pneumonia: No


Hx Pulmonary Embolism: No


Hx Sleep Apnea: No





- NEUROLOGICAL


Hx Alzheimer's Disease: No


Hx Dementia: No


Hx Migraine: No


Hx Multiple Sclerosis: No


Hx Parkinson's Disease: No


Hx Seizures: No


Hx Transient Ischemic Attacks (TIA): No





- HEENT


Hx HEENT Problems: No





- RENAL


Hx Chronic Kidney Disease: No


Hx Kidney Stones: No





- ENDOCRINE/METABOLIC


Hx Hyperthyroidism: No


Hx Hypothyroidism: No





- HEMATOLOGICAL/ONCOLOGICAL


Hx Anemia: No


Hx Human Immunodeficiency Virus (HIV): No


Hx Sickle Cell Disease: No





- INTEGUMENTARY


Hx Dermatological Problems: No





- MUSCULOSKELETAL/RHEUMATOLOGICAL


Hx Arthritis: Yes


Hx Fractures: No


Hx Osteoporosis: No


Hx Rheumatoid Arthritis: No





- GASTROINTESTINAL


Hx Crohn's Disease: No


Hx Diverticulitis: No


Hx Gall Bladder Disease: No


Hx Pancreatitis: No





- GENITOURINARY/GYNECOLOGICAL


Hx Sexually Transmitted Disorders: No





- PSYCHIATRIC


Hx Anxiety: Yes


Hx Bipolar Disorder: No


Hx Depression: Yes


Hx Post Traumatic Stress Disorder: No


Hx Schizophrenia: No





- SURGICAL HISTORY


Hx Appendectomy: No


Hx Carotid Endarterectomy: No


Hx Cholecystectomy: No


Hx Coronary Artery Bypass Graft: No


Hx Coronary Stent: No


Hx Tonsillectomy: No





- ANESTHESIA


Hx Anesthesia: Yes


Hx Anesthesia Reactions: No


Hx Malignant Hyperthermia: No





Meds


Allergies/Adverse Reactions: 


                                    Allergies











Allergy/AdvReac Type Severity Reaction Status Date / Time


 


penicillin G Allergy  RASH Verified 10/12/18 12:23


 


vancomycin Allergy  RASH Verified 10/12/18 12:23














Physical Exam





- Constitutional


Appears: In Acute Distress (Mild)





- Head Exam


Head Exam: NORMAL INSPECTION





- Eye Exam


Eye Exam: Normal appearance





- ENT Exam


ENT Exam: Mucous Membranes Moist





- Neck Exam


Neck exam: Positive for: Normal Inspection





- Respiratory Exam


Respiratory Exam: Clear to Auscultation Bilateral, NORMAL BREATHING PATTERN.  

absent: Rhonchi, Wheezes





- Cardiovascular Exam


Cardiovascular Exam: REGULAR RHYTHM, +S1, +S2





- GI/Abdominal Exam


GI & Abdominal Exam: Normal Bowel Sounds, Soft, Tenderness (mild b/l UQ)





- Extremities Exam


Extremities exam: Positive for: normal inspection





- Back Exam


Back exam: NORMAL INSPECTION.  absent: CVA tenderness (L), CVA tenderness (R)





- Neurological Exam


Neurological exam: Alert, CN II-XII Intact, Oriented x3





- Psychiatric Exam


Psychiatric exam: Normal Affect





- Skin


Skin Exam: Normal Color





Results





- Vital Signs


Recent Vital Signs: 





                                Last Vital Signs











Temp  98.2 F   11/03/18 00:10


 


Pulse  113 H  11/03/18 00:10


 


Resp  16   11/03/18 00:10


 


BP  113/90   11/03/18 00:10


 


Pulse Ox  99   11/03/18 04:26














- Labs


Result Diagrams: 


                                 11/03/18 01:30





                                 11/03/18 01:30


Labs: 





                         Laboratory Results - last 24 hr











  11/03/18 11/03/18 11/03/18





  01:30 01:30 01:30


 


WBC  7.5  


 


RBC  4.68  


 


Hgb  12.8  


 


Hct  39.1  


 


MCV  83.5  


 


MCH  27.4  


 


MCHC  32.9 L  


 


RDW  16.8 H  


 


Plt Count  296  


 


MPV  9.6  


 


Neut % (Auto)  76.6 H  


 


Lymph % (Auto)  9.5 L  


 


Mono % (Auto)  11.1 H  


 


Eos % (Auto)  2.5  


 


Baso % (Auto)  0.3  


 


Neut # (Auto)  5.7  


 


Lymph # (Auto)  0.7 L  


 


Mono # (Auto)  0.8  


 


Eos # (Auto)  0.2  


 


Baso # (Auto)  0.0  


 


Neutrophils % (Manual)  77 H  


 


Lymphocytes % (Manual)  10 L  


 


Monocytes % (Manual)  13 H  


 


Platelet Estimate  Normal  


 


Anisocytosis (manual)  Slight  


 


PT   


 


INR   


 


APTT   


 


Sodium   135 


 


Potassium   4.2 


 


Chloride   97 L 


 


Carbon Dioxide   28 


 


Anion Gap   14 


 


BUN   4 L 


 


Creatinine   0.4 L 


 


Est GFR ( Amer)   > 60 


 


Est GFR (Non-Af Amer)   > 60 


 


Random Glucose   144 H 


 


Lactic Acid    1.1


 


Calcium   9.5 


 


Total Bilirubin   4.0 H 


 


AST   127 H D 


 


ALT   89 H D 


 


Alkaline Phosphatase   299 H D 


 


Total Protein   8.4 H 


 


Albumin   3.8 


 


Globulin   4.6 H 


 


Albumin/Globulin Ratio   0.8 L 


 


Lipase   34 














  11/03/18





  01:30


 


WBC 


 


RBC 


 


Hgb 


 


Hct 


 


MCV 


 


MCH 


 


MCHC 


 


RDW 


 


Plt Count 


 


MPV 


 


Neut % (Auto) 


 


Lymph % (Auto) 


 


Mono % (Auto) 


 


Eos % (Auto) 


 


Baso % (Auto) 


 


Neut # (Auto) 


 


Lymph # (Auto) 


 


Mono # (Auto) 


 


Eos # (Auto) 


 


Baso # (Auto) 


 


Neutrophils % (Manual) 


 


Lymphocytes % (Manual) 


 


Monocytes % (Manual) 


 


Platelet Estimate 


 


Anisocytosis (manual) 


 


PT  14.1 H


 


INR  1.2


 


APTT  36.6


 


Sodium 


 


Potassium 


 


Chloride 


 


Carbon Dioxide 


 


Anion Gap 


 


BUN 


 


Creatinine 


 


Est GFR ( Amer) 


 


Est GFR (Non-Af Amer) 


 


Random Glucose 


 


Lactic Acid 


 


Calcium 


 


Total Bilirubin 


 


AST 


 


ALT 


 


Alkaline Phosphatase 


 


Total Protein 


 


Albumin 


 


Globulin 


 


Albumin/Globulin Ratio 


 


Lipase 














Assessment & Plan





- Assessment and Plan (Free Text)


Assessment: 





A/P: 


49 y/o F with PMH of HTN, chronic back/abdominal pain with multiple nerves 

blocks (Sees pain management as out patient), Colon cancer w/ metastasis to 

liver (on chemotherapy), Depression, Fibromyalgia, GERD admitted to the [facility] for

evaluation and treatment of intractable abdominal/back pain





Intractable abdominal pain, Acute on chronic, 2/2 metastatic colon Ca


- Afebrile


- CT abd/pelvis: Liver mass, unchanged from prior studies 


- Pain management consult, Dr. Winn, f/u recs 


- NPO


- C/w IVF


- Pain control: Hydromorphone PRN/ Oxycodone/ Tylenol#3/ Lidoderm patch for 

back/ Gabapentin 400 mg TID 


- Zofran for nausea and vomiting  





Chronic back pain 


- 2/2 metastatic colon Ca


- Chronic, H/O multiple lumbar surgeries. 


- Lidoderm patch for back pain


- Pain management consult, Dr. Winn, will follow recommendations 


- Pain control: Hydromorphone PRN/ Oxycodone/ Tylenol#3/ Lidoderm patch for back

 





NIDDM


- Controlled 


- Last A1c 7.7 on 5/28/18


- Low dose sliding scale


- Hold metformin 


- Hypoglycemia protocol 





HTN


- Controlled


- Hold Amlodipine 10 mg QD


- Monitor vitals





Fibromyalgia


- Controlled


- Gabapentin 400 mg TID


- Duloxetine 60 mg PO HS





DVT prophylaxis


- Lovenox 40mg SC daily 





<Chance Prado - Last Filed: 11/03/18 07:20>





Results





- Vital Signs


Recent Vital Signs: 





                                Last Vital Signs











Temp  98.1 F   11/03/18 05:30


 


Pulse  88   11/03/18 05:30


 


Resp  18   11/03/18 05:33


 


BP  118/81   11/03/18 05:30


 


Pulse Ox  98   11/03/18 05:30














- Labs


Result Diagrams: 


                                 11/03/18 01:30





                                 11/03/18 01:30


Labs: 





                         Laboratory Results - last 24 hr











  11/03/18 11/03/18 11/03/18





  01:30 01:30 01:30


 


WBC  7.5  


 


RBC  4.68  


 


Hgb  12.8  


 


Hct  39.1  


 


MCV  83.5  


 


MCH  27.4  


 


MCHC  32.9 L  


 


RDW  16.8 H  


 


Plt Count  296  


 


MPV  9.6  


 


Neut % (Auto)  76.6 H  


 


Lymph % (Auto)  9.5 L  


 


Mono % (Auto)  11.1 H  


 


Eos % (Auto)  2.5  


 


Baso % (Auto)  0.3  


 


Neut # (Auto)  5.7  


 


Lymph # (Auto)  0.7 L  


 


Mono # (Auto)  0.8  


 


Eos # (Auto)  0.2  


 


Baso # (Auto)  0.0  


 


Neutrophils % (Manual)  77 H  


 


Lymphocytes % (Manual)  10 L  


 


Monocytes % (Manual)  13 H  


 


Platelet Estimate  Normal  


 


Anisocytosis (manual)  Slight  


 


PT   


 


INR   


 


APTT   


 


Sodium   135 


 


Potassium   4.2 


 


Chloride   97 L 


 


Carbon Dioxide   28 


 


Anion Gap   14 


 


BUN   4 L 


 


Creatinine   0.4 L 


 


Est GFR ( Amer)   > 60 


 


Est GFR (Non-Af Amer)   > 60 


 


Random Glucose   144 H 


 


Lactic Acid    1.1


 


Calcium   9.5 


 


Total Bilirubin   4.0 H 


 


AST   127 H D 


 


ALT   89 H D 


 


Alkaline Phosphatase   299 H D 


 


Total Protein   8.4 H 


 


Albumin   3.8 


 


Globulin   4.6 H 


 


Albumin/Globulin Ratio   0.8 L 


 


Lipase   34 














  11/03/18





  01:30


 


WBC 


 


RBC 


 


Hgb 


 


Hct 


 


MCV 


 


MCH 


 


MCHC 


 


RDW 


 


Plt Count 


 


MPV 


 


Neut % (Auto) 


 


Lymph % (Auto) 


 


Mono % (Auto) 


 


Eos % (Auto) 


 


Baso % (Auto) 


 


Neut # (Auto) 


 


Lymph # (Auto) 


 


Mono # (Auto) 


 


Eos # (Auto) 


 


Baso # (Auto) 


 


Neutrophils % (Manual) 


 


Lymphocytes % (Manual) 


 


Monocytes % (Manual) 


 


Platelet Estimate 


 


Anisocytosis (manual) 


 


PT  14.1 H


 


INR  1.2


 


APTT  36.6


 


Sodium 


 


Potassium 


 


Chloride 


 


Carbon Dioxide 


 


Anion Gap 


 


BUN 


 


Creatinine 


 


Est GFR ( Amer) 


 


Est GFR (Non-Af Amer) 


 


Random Glucose 


 


Lactic Acid 


 


Calcium 


 


Total Bilirubin 


 


AST 


 


ALT 


 


Alkaline Phosphatase 


 


Total Protein 


 


Albumin 


 


Globulin 


 


Albumin/Globulin Ratio 


 


Lipase 














Attending/Attestation





- Attestation


I have personally seen and examined this patient.: Yes


I have fully participated in the care of the patient.: Yes


I have reviewed all pertinent clinical information: Yes


Notes (Text): 





11/03/18 07:15


i saw and examined this patient with Dr Francis. I agree with the assessment and 

plan above which reflect my direct input.


this is a 48 years old female with colon cancer metastasized to the liver result

ing in severe abdominal pains. She came to the ED because of the pain not being 

controlled with her home medication.The CT abdomen showed no changes





CT abdomen/pelvis


IMPRESSION: 


Mild diffuse irregularity of the hepatic contour.


9.3x6.7 cm heterogeneous infiltrative mass of the right hepatic lobe is 

unchanged.


Adjacent hepatic capsular infiltration is unchanged.


Mild amount of perihepatic free fluid is unchanged.


Unchanged mild right pleural effusion with passive atelectatic airspace disease 

of the right lower lobe.


Unchanged large annalisa hepatis the largest measuring 1.3 cm.


There is interval appearance of mild intrahepatic biliary ductal dilatation.


Absent gallbladder, unchanged.





The patient is being observed for Pain management





Chance Prado MD